# Patient Record
Sex: FEMALE | Race: WHITE | NOT HISPANIC OR LATINO | Employment: FULL TIME | URBAN - METROPOLITAN AREA
[De-identification: names, ages, dates, MRNs, and addresses within clinical notes are randomized per-mention and may not be internally consistent; named-entity substitution may affect disease eponyms.]

---

## 2017-01-16 ENCOUNTER — ANESTHESIA EVENT (OUTPATIENT)
Dept: SURGERY | Facility: OTHER | Age: 42
End: 2017-01-16
Payer: COMMERCIAL

## 2017-01-16 ENCOUNTER — HOSPITAL ENCOUNTER (OUTPATIENT)
Dept: PREADMISSION TESTING | Facility: OTHER | Age: 42
Discharge: HOME | End: 2017-01-16
Attending: OBSTETRICS & GYNECOLOGY
Payer: COMMERCIAL

## 2017-01-16 ENCOUNTER — OFFICE VISIT (OUTPATIENT)
Dept: OBSTETRICS AND GYNECOLOGY | Facility: CLINIC | Age: 42
End: 2017-01-16
Payer: COMMERCIAL

## 2017-01-16 VITALS
HEIGHT: 69 IN | BODY MASS INDEX: 35.55 KG/M2 | HEART RATE: 76 BPM | DIASTOLIC BLOOD PRESSURE: 90 MMHG | SYSTOLIC BLOOD PRESSURE: 145 MMHG | OXYGEN SATURATION: 96 % | WEIGHT: 240 LBS | TEMPERATURE: 99 F

## 2017-01-16 VITALS
HEIGHT: 68 IN | BODY MASS INDEX: 36.82 KG/M2 | DIASTOLIC BLOOD PRESSURE: 76 MMHG | WEIGHT: 242.94 LBS | SYSTOLIC BLOOD PRESSURE: 144 MMHG

## 2017-01-16 DIAGNOSIS — D25.1 FIBROIDS, INTRAMURAL: Primary | Chronic | ICD-10-CM

## 2017-01-16 DIAGNOSIS — R10.2 PELVIC PAIN IN FEMALE: ICD-10-CM

## 2017-01-16 DIAGNOSIS — M53.3 COCCYX PAIN: ICD-10-CM

## 2017-01-16 PROCEDURE — 99214 OFFICE O/P EST MOD 30 MIN: CPT | Mod: S$GLB,,, | Performed by: OBSTETRICS & GYNECOLOGY

## 2017-01-16 PROCEDURE — 1159F MED LIST DOCD IN RCRD: CPT | Mod: S$GLB,,, | Performed by: OBSTETRICS & GYNECOLOGY

## 2017-01-16 PROCEDURE — 99999 PR PBB SHADOW E&M-EST. PATIENT-LVL II: CPT | Mod: PBBFAC,,, | Performed by: OBSTETRICS & GYNECOLOGY

## 2017-01-16 RX ORDER — MIDAZOLAM HYDROCHLORIDE 5 MG/ML
4 INJECTION INTRAMUSCULAR; INTRAVENOUS ONCE AS NEEDED
Status: CANCELLED | OUTPATIENT
Start: 2017-01-16 | End: 2017-01-16

## 2017-01-16 RX ORDER — LEVOTHYROXINE SODIUM 112 UG/1
112 TABLET ORAL DAILY
COMMUNITY
End: 2017-01-26 | Stop reason: SDUPTHER

## 2017-01-16 RX ORDER — SODIUM CHLORIDE, SODIUM LACTATE, POTASSIUM CHLORIDE, CALCIUM CHLORIDE 600; 310; 30; 20 MG/100ML; MG/100ML; MG/100ML; MG/100ML
INJECTION, SOLUTION INTRAVENOUS CONTINUOUS
Status: CANCELLED | OUTPATIENT
Start: 2017-01-16

## 2017-01-16 RX ORDER — SCOLOPAMINE TRANSDERMAL SYSTEM 1 MG/1
1 PATCH, EXTENDED RELEASE TRANSDERMAL
Status: CANCELLED | OUTPATIENT
Start: 2017-01-16

## 2017-01-16 RX ORDER — FAMOTIDINE 20 MG/1
20 TABLET, FILM COATED ORAL
Status: CANCELLED | OUTPATIENT
Start: 2017-01-16 | End: 2017-01-16

## 2017-01-16 RX ORDER — ACETAMINOPHEN 10 MG/ML
1000 INJECTION, SOLUTION INTRAVENOUS
Status: CANCELLED | OUTPATIENT
Start: 2017-01-30 | End: 2017-01-30

## 2017-01-16 NOTE — DISCHARGE INSTRUCTIONS
PRE-ADMIT TESTING -  [phone]    [address]  [facility]        OUTPATIENT SURGERY UNIT - [phone]    Your surgery has been scheduled at [facility]. We are pleased to have the opportunity to serve you. For Further Information please call [phone].    On the day of surgery please report to the Information Desk on the 1st floor.    CONTACT YOUR PHYSICIAN'S OFFICE THE DAY PRIOR TO YOUR SURGERY TO OBTAIN YOUR ARRIVAL TIME.     The evening before surgery do not eat anything after 9 p.m. ( this includes hard candy, chewing gum and mints).  You may have GATORADE, POWERADE AND WATER FROM 9 p.m. until leaving home to come to the hospital.   DO NOT DRINK ANY LIQUIDS ON THE WAY TO THE HOSPITAL.     SPECIAL MEDICATION INSTRUCTIONS: TAKE medications checked off by the Anesthesiologist on your Medication List.    Angiogram Patients: Take medications as instructed by your physician, including aspirin.     Surgery Patients:    If you take ASPIRIN - Your PHYSICIAN/SURGEON will need to inform you IF/OR when you need to stop taking aspirin prior to your surgery.     Do Not take any medications containing IBUPROFEN.  Do Not Wear any make-up or dark nail polish   (especially eye make-up) to surgery. If you come to surgery with makeup on you will be required to remove the makeup or nail polish.    Do not shave your surgical area at least 5 days prior to your surgery. The surgical prep will be performed at the hospital according to Infection Control regulations.    Leave all valuables at home.   Do Not wear any jewelry or watches, including any metal in body piercings.  Contact Lens must be removed before surgery. Either do not wear the contact lens or bring a case and solution for storage.  Please bring a container for eyeglasses or dentures as required.  Bring any paperwork your physician has provided, such as consent forms,  history and physicals, doctor's orders, etc.   Bring comfortable  clothes that are loose fitting to wear upon discharge. Take into consideration the type of surgery being performed.  Maintain your diet as advised per your physician the day prior to surgery.      Adequate rest the night before surgery is advised.   Park in the Parking lot behind the hospital or in the [place] [facility] across the street from the parking lot. Parking is complimentary.  If you will be discharged the same day as your procedure, please arrange for a responsible adult to drive you home or to accompany you if traveling by taxi.   YOU WILL NOT BE PERMITTED TO DRIVE OR TO LEAVE THE HOSPITAL ALONE AFTER SURGERY.   It is strongly recommended that you arrange for someone to remain with you for the first 24 hrs following your surgery.       Thank you for your cooperation.  The Staff of [facility].        Bathing Instructions                                                                 Please shower the evening before and morning of your procedure with    ANTIBACTERIAL SOAP. ( DIAL, etc )  Concentrate on the surgical area   for at least 3 minutes and rinse completely. Dry off as usual.                            No lotions or creams.

## 2017-01-16 NOTE — PROGRESS NOTES
"C.C:  Pelvic pain secondary to fibroids    HPI : Nifna Aguilar is a 41 y.o. female No obstetric history on file. for evaluation and discussion of treatment options for persistent low pelvic/"coccyx" pain.  Patient reports that she has been worked up for this pain and it is not musculosketeal per her report.  Multiple radiologic studies/MR of pelvis reveals no explainable cause for her severe pelvic pain.  Her pain is 7/10 today and is reportedly often 10/10.  Patient desires definitive surgical management with removal of uterus.  Patient with laparoscopy and lysis of adhesions 8/2016 in [place], [place] - denies endometriosis as cause of pain.      Patient is a Orthodoxy.    Past Medical History   Diagnosis Date    Arthritis     Back pain     Cervical disc syndrome     Depression     Essential tremor     General anesthetics causing adverse effect in therapeutic use      patient reports she was told she "woke up" during tubal ligation    Goiter      MNG    Hypothyroidism     Lumbar disc disease     Patient is Orthodoxy     Polycystic ovaries     PONV (postoperative nausea and vomiting)     Vitamin D deficiency      Past Surgical History   Procedure Laterality Date    Tubal lig  1998    Shoulder surgery       right    Appendectomy      Ovarian cyst surgery Right     Dilation and curettage of uterus       Family History   Problem Relation Age of Onset    Cancer Mother 50     breast    COPD Father     Peripheral vascular disease Father     Asperger's syndrome Daughter     Thyroid disease Daughter      Hashimotos'     Social History   Substance Use Topics    Smoking status: Former Smoker     Packs/day: 0.10     Years: 5.00     Types: Cigarettes     Quit date: 7/9/1992    Smokeless tobacco: None    Alcohol use 0.6 - 1.2 oz/week     1 - 2 Glasses of wine per week      Comment: daily     OB History   No data available       Visit Vitals    BP (!) 144/76    Ht 5' 8" (1.727 m) "    Wt 110.2 kg (242 lb 15.2 oz)    LMP 01/15/2017    BMI 36.94 kg/m2       ROS:  GENERAL: Feeling well overall.   SKIN: Denies rash or lesions.   HEAD: Denies head injury or headache.   NODES: Denies enlarged lymph nodes.   CHEST: Denies chest pain or shortness of breath.   CARDIOVASCULAR: Denies palpitations or left sided chest pain.   ABDOMEN: No abdominal pain, constipation, diarrhea, nausea, vomiting or rectal bleeding.   URINARY: No frequency, dysuria, hematuria, or burning on urination.  REPRODUCTIVE: See HPI.   BREASTS: Denies pain, lumps, or nipple discharge.   HEMATOLOGIC: No easy bruisability.  MUSCULOSKELETAL: Denies joint pain or swelling.   NEUROLOGIC: Denies syncope or weakness.   PSYCHIATRIC: Denies depression, anxiety or mood swings.      PHYSICAL EXAM:  APPEARANCE: Well nourished, well developed, in no acute distress.  AFFECT: WNL, alert and oriented x 3  SKIN: No acne or hirsutism  NECK: Neck symmetric without masses or thyromegaly  NODES: No inguinal, cervical, axillary, or femoral lymph node enlargement  CHEST: Good respiratory effect  ABDOMEN: Soft.  No tenderness or masses.  No hepatosplenomegaly.  No hernias.  PELVIC: Normal external genitalia without lesions.  Normal hair distribution.  Adequate perineal body, normal urethral meatus.  Vagina moist and well rugated without lesions or discharge.  Cervix pink, without lesions, discharge or tenderness.  No significant cystocele or rectocele.  Bimanual exam shows uterus to be 10-12 weeks size, irregular contour (fibroids), and mobile.  + discomfort (low pelvic/back) to uterine palpation and manipulation.  Adnexa without masses or tenderness.    EXTREMITIES: No edema.    ASSESSMENT & PLAN:  1. Fibroids, intramural    2. Pelvic pain in female    3. Coccyx pain    4. Body mass index (bmi) 36.0-36.9, adult    5. Patient is Tenriism      I have discussed the risks, benefits, indications, and alternatives of the procedure in detail.  The  patient verbalizes her understanding.  All questions answered.  Consents signed.  The patient agrees to proceed to surgery scheduling.    Plan:  DaVinci assisted hysterectomy with bilateral salpingectomy.  Patient desires ovarian conservation.    Power of /blood refusal form reviewed with patient and spouse.    Tr Roberts IV, MD

## 2017-01-16 NOTE — MR AVS SNAPSHOT
[facility] - OB/GYN [address]  [address]  [facility] [address]  Phone: [phone]  Fax: [phone]                  Ninfa Aguilar   2017 2:30 PM   Office Visit    Description:  Female : 1975   Provider:  Tr Roberts IV, MD   Department:  [facility] - OB/GYN [address]           Reason for Visit     Pre-op Exam     enlarged Uterus     Pelvic Pain     Fibroids     Tailbone Pain                To Do List           Your Future Surgeries/Procedures     2017   Surgery with Tr Roberts IV, MD   [facility]    [address]  [facility] [address] [phone]              Goals (5 Years of Data)     None      [facility]     [facility] Nurse Care Line -  Assistance  Registered nurses in the [facility] provide clinical advisement, health education, appointment booking, and other advisory services.  Call for this free service at [phone].             Medications           Message regarding Medications     Verify the changes and/or additions to your medication regime listed below are the same as discussed with your clinician today.  If any of these changes or additions are incorrect, please notify your healthcare provider.             Verify that the below list of medications is an accurate representation of the medications you are currently taking.  If none reported, the list may be blank. If incorrect, please contact your healthcare provider. Carry this list with you in case of emergency.           Current Medications     alprazolam (XANAX) 0.5 MG tablet Take 0.25 mg by mouth.    carisoprodol (SOMA) 350 MG tablet Take 350 mg by mouth.    cholecalciferol, vitamin D3, (VITAMIN D3) 1,000 unit capsule Take 5,000 Units by mouth.    levothyroxine (SYNTHROID) 112 MCG tablet Take 112 mcg by mouth once daily.    vitamin E 1000 UNIT capsule Take 1,000 Units by mouth.           Clinical Reference  "Information           Vital Signs - Last Recorded  Most recent update: 1/16/2017  2:43 PM by Loretta Thomas MA    BP Ht Wt LMP BMI    (!) 144/76 5' 8" (1.727 m) 110.2 kg (242 lb 15.2 oz) 01/15/2017 36.94 kg/m2      Blood Pressure          Most Recent Value    BP  (!)  144/76      Allergies as of 1/16/2017     Iodine And Iodide Containing Products    Meloxicam      Immunizations Administered on Date of Encounter - 1/16/2017     None      "

## 2017-01-16 NOTE — ANESTHESIA PREPROCEDURE EVALUATION
01/16/2017  Ninfa Aguilar is a 41 y.o., female.    [facility] Anesthesia Evaluation    I have reviewed the Patient Summary Reports.    I have reviewed the Nursing Notes.   I have reviewed the Medications.     Review of Systems  Anesthesia Hx:  No problems with previous Anesthesia Possible awareness under anesthesia 1998, didn't bother her   Social:  Non-Smoker    Cardiovascular:   Exercise tolerance: good    Pulmonary:  Pulmonary Normal    Endocrine:   Hypothyroidism    Psych:   depression          Physical Exam  General:  Well nourished, Morbid Obesity    Airway/Jaw/Neck:  Mallampati: III Jaw/Neck Findings:  Neck ROM: Normal ROM                 Anesthesia Plan  Type of Anesthesia, risks & benefits discussed:  Anesthesia Type:  general  Patient's Preference:   Intra-op Monitoring Plan:   Intra-op Monitoring Plan Comments:   Post Op Pain Control Plan:   Post Op Pain Control Plan Comments:   Induction:   IV  Beta Blocker:         Informed Consent: Patient understands risks and agrees with Anesthesia plan.  Questions answered. Anesthesia consent signed with patient.  ASA Score: 2     Day of Surgery Review of History & Physical:    H&P update referred to the surgeon.     Anesthesia Plan Notes: JH, refuses blood even at risk of dying.        Ready For Surgery From Anesthesia Perspective.

## 2017-01-16 NOTE — IP AVS SNAPSHOT
[facility] Location ([facility]) [address] [address]  Phone: [phone]           I have received a copy of my After Visit Summary and discharge instructions from [facility].    INSTRUCTIONS RECEIVED AND UNDERSTOOD BY:                     Patient/Patient Representative: ________________________________________________________________     Date/Time: ________________________________________________________________                     Instructions Given By: ________________________________________________________________     Date/Time: ________________________________________________________________

## 2017-01-16 NOTE — IP AVS SNAPSHOT
[facility] Location ([facility])  [address] [place], [place] [id number]  Phone: [phone]           Patient Discharge Instructions    Our goal is to set you up for success. This packet includes information on your condition, medications, and your home care. It will help you to care for yourself so you don't get sicker.     Please ask your nurse if you have any questions.        There are many details to remember when preparing for your surgery. Here is what you will need to do, please ask your nurse if there are more specific instructions and if you have any questions:    1. 24 hours before procedure Do not smoke or drink alcoholic beverages 24 hours prior to your procedure    2. Eating before procedure Do not eat or drink anything 8 hours before your procedure - this includes gum, mints, and candy.     3. Day of procedure Please remove all jewelry for the procedure. If you wear contact lenses, dentures, hearing aids or glasses, bring a container to put them in during your surgery and give to a family member for safekeeping.  If your doctor has scheduled you for an overnight stay, bring a small overnight bag with any personal items that you need.    4. After procedure Make arrangements in advance for transportation home by a responsible adult. It is not safe to drive a vehicle during the 24 hours following surgery.     PLEASE NOTE: You may be contacted the day before your surgery to confirm your surgery date and arrival time. The Surgery schedule has many variables which may affect the time of your surgery case. Family members should be available if your surgery time changes.                [facility]  Unless otherwise directed by your provider, please contact [facility], our nurse care line that is available for 24/7 assistance.     [phone] (toll-free)    Registered nurses in the [facility] provide clinical advisement, health education, appointment booking, and other  advisory services.                    ** Verify the list of medication(s) below is accurate and up to date. Carry this with you in case of emergency. If your medications have changed, please notify your healthcare provider.             Medication List      TAKE these medications        Additional Info                      alprazolam 0.5 MG tablet   Commonly known as:  XANAX   Refills:  0   Dose:  0.25 mg    Instructions:  Take 0.25 mg by mouth.     Begin Date    AM    Noon    PM    Bedtime       carisoprodol 350 MG tablet   Commonly known as:  SOMA   Refills:  0   Dose:  350 mg    Instructions:  Take 350 mg by mouth.     Begin Date    AM    Noon    PM    Bedtime       levothyroxine 112 MCG tablet   Commonly known as:  SYNTHROID   Refills:  0   Dose:  112 mcg   What changed:  Another medication with the same name was removed. Continue taking this medication, and follow the directions you see here.    Instructions:  Take 112 mcg by mouth once daily.     Begin Date    AM    Noon    PM    Bedtime       VITAMIN D3 1,000 unit capsule   Refills:  0   Dose:  5000 Units   Generic drug:  cholecalciferol (vitamin D3)    Instructions:  Take 5,000 Units by mouth.     Begin Date    AM    Noon    PM    Bedtime       vitamin E 1000 UNIT capsule   Refills:  0   Dose:  1000 Units    Instructions:  Take 1,000 Units by mouth.     Begin Date    AM    Noon    PM    Bedtime                  Please bring to all follow up appointments:    1. A copy of your discharge instructions.  2. All medicines you are currently taking in their original bottles.  3. Identification and insurance card.    Please arrive 15 minutes ahead of scheduled appointment time.    Please call 24 hours in advance if you must reschedule your appointment and/or time.        Your Scheduled Appointments     Jan 16, 2017  2:30 PM CST   Pre OP with Tr Roberts IV, MD   [facility] - OB/GYN [address] ([facility])    [address] [address]    [phone]              Your Future Surgeries/Procedures     Jan 30, 2017   Surgery with Tr Roberts IV, MD   [facility] ([facility])    [address]  [facility] [id number] [phone]                  Discharge Instructions       PRE-ADMIT TESTING -  [phone]    [address]  [facility]        OUTPATIENT SURGERY UNIT - [phone]    Your surgery has been scheduled at [facility]. We are pleased to have the opportunity to serve you. For Further Information please call [phone].    On the day of surgery please report to the Information Desk on the 1st floor.    CONTACT YOUR PHYSICIAN'S OFFICE THE DAY PRIOR TO YOUR SURGERY TO OBTAIN YOUR ARRIVAL TIME.     The evening before surgery do not eat anything after 9 p.m. ( this includes hard candy, chewing gum and mints).  You may have GATORADE, POWERADE AND WATER FROM 9 p.m. until leaving home to come to the hospital.   DO NOT DRINK ANY LIQUIDS ON THE WAY TO THE HOSPITAL.     SPECIAL MEDICATION INSTRUCTIONS: TAKE medications checked off by the Anesthesiologist on your Medication List.    Angiogram Patients: Take medications as instructed by your physician, including aspirin.     Surgery Patients:    If you take ASPIRIN - Your PHYSICIAN/SURGEON will need to inform you IF/OR when you need to stop taking aspirin prior to your surgery.     Do Not take any medications containing IBUPROFEN.  Do Not Wear any make-up or dark nail polish   (especially eye make-up) to surgery. If you come to surgery with makeup on you will be required to remove the makeup or nail polish.    Do not shave your surgical area at least 5 days prior to your surgery. The surgical prep will be performed at the hospital according to Infection Control regulations.    Leave all valuables at home.   Do Not wear any jewelry or watches, including any metal in body piercings.  Contact Lens must be removed before surgery. Either  "do not wear the contact lens or bring a case and solution for storage.  Please bring a container for eyeglasses or dentures as required.  Bring any paperwork your physician has provided, such as consent forms,  history and physicals, doctor's orders, etc.   Bring comfortable clothes that are loose fitting to wear upon discharge. Take into consideration the type of surgery being performed.  Maintain your diet as advised per your physician the day prior to surgery.      Adequate rest the night before surgery is advised.   Park in the Parking lot behind the hospital or in the [facility] [facility] across the street from the parking lot. Parking is complimentary.  If you will be discharged the same day as your procedure, please arrange for a responsible adult to drive you home or to accompany you if traveling by taxi.   YOU WILL NOT BE PERMITTED TO DRIVE OR TO LEAVE THE HOSPITAL ALONE AFTER SURGERY.   It is strongly recommended that you arrange for someone to remain with you for the first 24 hrs following your surgery.       Thank you for your cooperation.  The Staff of [facility].        Bathing Instructions                                                                 Please shower the evening before and morning of your procedure with    ANTIBACTERIAL SOAP. ( DIAL, etc )  Concentrate on the surgical area   for at least 3 minutes and rinse completely. Dry off as usual.                            No lotions or creams.                        Admission Information     Date & Time Provider Department CSN    1/16/2017 12:30 PM Tr Roberts IV, MD [facility] [id number]      Care Providers     Provider Role Specialty Primary office phone    Tr Roberts IV, MD Attending Provider Obstetrics [phone]      Your Vitals Were     BP Pulse Temp Height Weight SpO2    145/90 76 98.9 °F (37.2 °C) (Oral) 5' 8.5" (1.74 m) 108.9 kg (240 lb) 96%    BMI                35.96 kg/m2        "   Recent Lab Values        8/25/2010 11/22/2016                       12:40 PM 10:55 AM          A1C 5.6 5.6          Comment for A1C at 10:55 AM on 11/22/2016:  According to ADA guidelines, hemoglobin A1C <7.0% represents  optimal control in non-pregnant diabetic patients.  Different  metrics may apply to specific populations.   Standards of Medical Care in Diabetes - 2016.  For the purpose of screening for the presence of diabetes:  <5.7%     Consistent with the absence of diabetes  5.7-6.4%  Consistent with increasing risk for diabetes   (prediabetes)  >or=6.5%  Consistent with diabetes  Currently no consensus exists for use of hemoglobin A1C  for diagnosis of diabetes for children.        Allergies as of 1/16/2017        Reactions    Iodine And Iodide Containing Products Rash    Eats shellfish.  Eats shellfish.    Meloxicam Hives    Can take ibuprofen      Advance Directives     An advance directive is a document which, in the event you are no longer able to make decisions for yourself, tells your healthcare team what kind of treatment you do or do not want to receive, or who you would like to make those decisions for you.  If you do not currently have an advance directive, [facility] encourages you to create one.  For more information call:  [phone] ([phone]), [phone] ([phone]),  or log on to [email or web address].        Smoking Cessation     If you would like to quit smoking:   You may be eligible for free services if you are a [place] resident and started smoking cigarettes before September 1, 1988.  Call the Smoking Cessation Trust (RUST) toll free at (359) 956-0512 or (075) 313-5825.   Call 4-668-QUIT-NOW if you do not meet the above criteria.            Language Assistance Services     ATTENTION: Language assistance services are available, free of charge. Please call [phone].      ATENCIÓN: Si habla español, tiene a nguyen disposición servicios gratuitos de asistencia  lingüística. Davion peace [phone].     MIGUEL Ý: N?u b?n nói Ti?ng Vi?t, có các d?ch v? h? tr? ngôn ng? mi?n phí dành cho b?n. G?i s? [phone].         [facility] complies with applicable Federal civil rights laws and does not discriminate on the basis of race, color, national origin, age, disability, or sex.

## 2017-01-26 ENCOUNTER — TELEPHONE (OUTPATIENT)
Dept: ENDOCRINOLOGY | Facility: CLINIC | Age: 42
End: 2017-01-26

## 2017-01-26 DIAGNOSIS — E06.3 HYPOTHYROIDISM DUE TO HASHIMOTO'S THYROIDITIS: Primary | ICD-10-CM

## 2017-01-26 DIAGNOSIS — E03.8 HYPOTHYROIDISM DUE TO HASHIMOTO'S THYROIDITIS: Primary | ICD-10-CM

## 2017-01-26 RX ORDER — LEVOTHYROXINE SODIUM 112 UG/1
112 TABLET ORAL DAILY
Qty: 90 TABLET | Refills: 2 | Status: SHIPPED | OUTPATIENT
Start: 2017-01-26 | End: 2021-07-21 | Stop reason: DRUGHIGH

## 2017-01-27 ENCOUNTER — TELEPHONE (OUTPATIENT)
Dept: OBSTETRICS AND GYNECOLOGY | Facility: CLINIC | Age: 42
End: 2017-01-27

## 2017-01-27 NOTE — TELEPHONE ENCOUNTER
Patient informed she is now first case, report to surgery center at 530 am Monday 1/30/2017. Instructions given on clear liquid diet and only water/power aid after 10pm, until she starts travel to hospital in the am. Patient verbalized understanding

## 2017-01-27 NOTE — TELEPHONE ENCOUNTER
Results for orders placed or performed in visit on 11/22/16   TSH   Result Value Ref Range    TSH 0.616 0.400 - 4.000 uIU/mL   T4, free   Result Value Ref Range    Free T4 1.09 0.71 - 1.51 ng/dL   T3, free   Result Value Ref Range    T3, Free 1.9 (L) 2.3 - 4.2 pg/mL   Comprehensive metabolic panel   Result Value Ref Range    Sodium 138 136 - 145 mmol/L    Potassium 4.4 3.5 - 5.1 mmol/L    Chloride 106 95 - 110 mmol/L    CO2 23 23 - 29 mmol/L    Glucose 94 70 - 110 mg/dL    BUN, Bld 11 6 - 20 mg/dL    Creatinine 1.0 0.5 - 1.4 mg/dL    Calcium 8.7 8.7 - 10.5 mg/dL    Total Protein 6.9 6.0 - 8.4 g/dL    Albumin 3.8 3.5 - 5.2 g/dL    Total Bilirubin 0.5 0.1 - 1.0 mg/dL    Alkaline Phosphatase 86 55 - 135 U/L    AST 23 10 - 40 U/L    ALT 24 10 - 44 U/L    Anion Gap 9 8 - 16 mmol/L    eGFR if African American >60.0 >60 mL/min/1.73 m^2    eGFR if non African American >60.0 >60 mL/min/1.73 m^2   Hemoglobin A1c   Result Value Ref Range    Hemoglobin A1C 5.6 4.5 - 6.2 %    Estimated Avg Glucose 114 68 - 131 mg/dL         Spoke with patient and she says that her PCP increased her Levothyroxine dose to 112 mcg daily. New lab test shows a TSH of 0.297 [ 0.358-3.740], Free T4 of 1.17 [0.76-1.46 and Free T3 of 2.36 [ 2.18-3.98].  Told her that TSH is slightly low but she is feeling fine so will continue same dose for now. She is having a hysterectomy next week because of heavy and prolong bleeding.     Plan: Levothyroxine 112 mcg refill was sent to her pharmacy.

## 2017-01-30 ENCOUNTER — ANESTHESIA (OUTPATIENT)
Dept: SURGERY | Facility: OTHER | Age: 42
End: 2017-01-30
Payer: COMMERCIAL

## 2017-01-30 ENCOUNTER — SURGERY (OUTPATIENT)
Age: 42
End: 2017-01-30

## 2017-01-30 PROCEDURE — 63600175 PHARM REV CODE 636 W HCPCS: Performed by: ANESTHESIOLOGY

## 2017-01-30 PROCEDURE — 25000003 PHARM REV CODE 250: Performed by: NURSE ANESTHETIST, CERTIFIED REGISTERED

## 2017-01-30 PROCEDURE — 63600175 PHARM REV CODE 636 W HCPCS: Performed by: NURSE ANESTHETIST, CERTIFIED REGISTERED

## 2017-01-30 PROCEDURE — 63600175 PHARM REV CODE 636 W HCPCS: Performed by: OBSTETRICS & GYNECOLOGY

## 2017-01-30 PROCEDURE — 25000003 PHARM REV CODE 250: Performed by: ANESTHESIOLOGY

## 2017-01-30 RX ORDER — ROCURONIUM BROMIDE 10 MG/ML
INJECTION, SOLUTION INTRAVENOUS
Status: DISCONTINUED | OUTPATIENT
Start: 2017-01-30 | End: 2017-01-30

## 2017-01-30 RX ORDER — LIDOCAINE HCL/PF 100 MG/5ML
SYRINGE (ML) INTRAVENOUS
Status: DISCONTINUED | OUTPATIENT
Start: 2017-01-30 | End: 2017-01-30

## 2017-01-30 RX ORDER — DEXAMETHASONE SODIUM PHOSPHATE 4 MG/ML
INJECTION, SOLUTION INTRA-ARTICULAR; INTRALESIONAL; INTRAMUSCULAR; INTRAVENOUS; SOFT TISSUE
Status: DISCONTINUED | OUTPATIENT
Start: 2017-01-30 | End: 2017-01-30

## 2017-01-30 RX ORDER — ONDANSETRON 2 MG/ML
INJECTION INTRAMUSCULAR; INTRAVENOUS
Status: DISCONTINUED | OUTPATIENT
Start: 2017-01-30 | End: 2017-01-30

## 2017-01-30 RX ORDER — FENTANYL CITRATE 50 UG/ML
INJECTION, SOLUTION INTRAMUSCULAR; INTRAVENOUS
Status: DISCONTINUED | OUTPATIENT
Start: 2017-01-30 | End: 2017-01-30

## 2017-01-30 RX ORDER — PROPOFOL 10 MG/ML
VIAL (ML) INTRAVENOUS
Status: DISCONTINUED | OUTPATIENT
Start: 2017-01-30 | End: 2017-01-30

## 2017-01-30 RX ORDER — KETOROLAC TROMETHAMINE 30 MG/ML
INJECTION, SOLUTION INTRAMUSCULAR; INTRAVENOUS
Status: DISCONTINUED | OUTPATIENT
Start: 2017-01-30 | End: 2017-01-30

## 2017-01-30 RX ORDER — MIDAZOLAM HYDROCHLORIDE 1 MG/ML
INJECTION INTRAMUSCULAR; INTRAVENOUS
Status: DISCONTINUED | OUTPATIENT
Start: 2017-01-30 | End: 2017-01-30

## 2017-01-30 RX ORDER — GLYCOPYRROLATE 0.2 MG/ML
INJECTION INTRAMUSCULAR; INTRAVENOUS
Status: DISCONTINUED | OUTPATIENT
Start: 2017-01-30 | End: 2017-01-30

## 2017-01-30 RX ORDER — ACETAMINOPHEN 10 MG/ML
INJECTION, SOLUTION INTRAVENOUS
Status: DISCONTINUED | OUTPATIENT
Start: 2017-01-30 | End: 2017-01-30

## 2017-01-30 RX ORDER — NEOSTIGMINE METHYLSULFATE 1 MG/ML
INJECTION, SOLUTION INTRAVENOUS
Status: DISCONTINUED | OUTPATIENT
Start: 2017-01-30 | End: 2017-01-30

## 2017-01-30 RX ADMIN — DEXAMETHASONE SODIUM PHOSPHATE 8 MG: 4 INJECTION, SOLUTION INTRAMUSCULAR; INTRAVENOUS at 07:01

## 2017-01-30 RX ADMIN — ROCURONIUM BROMIDE 10 MG: 10 INJECTION INTRAVENOUS at 07:01

## 2017-01-30 RX ADMIN — ONDANSETRON 4 MG: 2 INJECTION INTRAMUSCULAR; INTRAVENOUS at 07:01

## 2017-01-30 RX ADMIN — LIDOCAINE HYDROCHLORIDE 100 MG: 20 INJECTION, SOLUTION INTRAVENOUS at 07:01

## 2017-01-30 RX ADMIN — ACETAMINOPHEN 1000 MG: 10 INJECTION, SOLUTION INTRAVENOUS at 07:01

## 2017-01-30 RX ADMIN — ROCURONIUM BROMIDE 40 MG: 10 INJECTION INTRAVENOUS at 07:01

## 2017-01-30 RX ADMIN — FENTANYL CITRATE 100 MCG: 50 INJECTION, SOLUTION INTRAMUSCULAR; INTRAVENOUS at 07:01

## 2017-01-30 RX ADMIN — ROCURONIUM BROMIDE 10 MG: 10 INJECTION INTRAVENOUS at 08:01

## 2017-01-30 RX ADMIN — GLYCOPYRROLATE 0.2 MG: 0.2 INJECTION, SOLUTION INTRAMUSCULAR; INTRAVENOUS at 07:01

## 2017-01-30 RX ADMIN — SODIUM CHLORIDE, SODIUM LACTATE, POTASSIUM CHLORIDE, AND CALCIUM CHLORIDE: 600; 310; 30; 20 INJECTION, SOLUTION INTRAVENOUS at 06:01

## 2017-01-30 RX ADMIN — NEOSTIGMINE METHYLSULFATE 5 MG: 1 INJECTION INTRAVENOUS at 09:01

## 2017-01-30 RX ADMIN — PROPOFOL 200 MG: 10 INJECTION, EMULSION INTRAVENOUS at 07:01

## 2017-01-30 RX ADMIN — CARBOXYMETHYLCELLULOSE SODIUM 2 DROP: 2.5 SOLUTION/ DROPS OPHTHALMIC at 07:01

## 2017-01-30 RX ADMIN — CEFAZOLIN SODIUM 2 G: 2 SOLUTION INTRAVENOUS at 07:01

## 2017-01-30 RX ADMIN — FENTANYL CITRATE 50 MCG: 50 INJECTION, SOLUTION INTRAMUSCULAR; INTRAVENOUS at 09:01

## 2017-01-30 RX ADMIN — GLYCOPYRROLATE 0.8 MG: 0.2 INJECTION, SOLUTION INTRAMUSCULAR; INTRAVENOUS at 09:01

## 2017-01-30 RX ADMIN — PROPOFOL 100 MG: 10 INJECTION, EMULSION INTRAVENOUS at 09:01

## 2017-01-30 RX ADMIN — MIDAZOLAM HYDROCHLORIDE 2 MG: 1 INJECTION, SOLUTION INTRAMUSCULAR; INTRAVENOUS at 06:01

## 2017-01-30 RX ADMIN — KETOROLAC TROMETHAMINE 30 MG: 30 INJECTION, SOLUTION INTRAMUSCULAR; INTRAVENOUS at 09:01

## 2017-01-30 NOTE — TRANSFER OF CARE
"Anesthesia Transfer of Care Note    Patient: Ninfa Aguilar    Procedure(s) Performed: Procedure(s) (LRB):  ROBOTIC ASSISTED LAPAROSCOPIC HYSTERECTOMY WITH POSSIBLE STAGING (N/A)  SALPINGECTOMY-ROBOTIC ASSISTED (Bilateral)    Patient location: PACU    Anesthesia Type: general    Transport from OR: Transported from OR on 2-3 L/min O2 by NC with adequate spontaneous ventilation    Post pain: adequate analgesia    Post assessment: no apparent anesthetic complications    Post vital signs: stable    Level of consciousness: awake, alert and oriented    Nausea/Vomiting: no nausea/vomiting    Complications: none          Last vitals:   Visit Vitals    BP (!) 142/76 (BP Location: Left arm, Patient Position: Lying, BP Method: Automatic)    Pulse 65    Temp 37.1 °C (98.8 °F) (Oral)    Resp 16    Ht 5' 8.5" (1.74 m)    Wt 108.9 kg (240 lb)    LMP 01/15/2017    SpO2 99%    Breastfeeding No    BMI 35.96 kg/m2     "

## 2017-01-30 NOTE — ANESTHESIA POSTPROCEDURE EVALUATION
"Anesthesia Post Evaluation    Patient: Ninfa Aguilar    Procedure(s) Performed: Procedure(s) (LRB):  ROBOTIC ASSISTED LAPAROSCOPIC HYSTERECTOMY WITH POSSIBLE STAGING (N/A)  SALPINGECTOMY-ROBOTIC ASSISTED (Bilateral)    Final Anesthesia Type: general  Patient location during evaluation: PACU  Patient participation: Yes- Able to Participate  Level of consciousness: awake and alert  Post-procedure vital signs: reviewed and stable  Pain management: adequate  Airway patency: patent  PONV status at discharge: No PONV  Anesthetic complications: no      Cardiovascular status: hemodynamically stable  Respiratory status: unassisted and spontaneous ventilation  Hydration status: euvolemic  Follow-up not needed.        Visit Vitals    BP (!) 142/91 (BP Location: Right arm, Patient Position: Lying, BP Method: Automatic)    Pulse 81    Temp 36.5 °C (97.7 °F) (Oral)    Resp 18    Ht 5' 8.5" (1.74 m)    Wt 108.9 kg (240 lb)    LMP 01/15/2017    SpO2 99%    Breastfeeding No    BMI 35.96 kg/m2       Pain/Lina Score: Pain Assessment Performed: Yes (1/30/2017  1:00 PM)  Presence of Pain: complains of pain/discomfort (1/30/2017  1:00 PM)  Pain Rating Prior to Med Admin: 8 (1/30/2017 11:47 AM)  Pain Rating Post Med Admin: 7 (1/30/2017 12:00 PM)  Lina Score: 10 (1/30/2017  1:00 PM)      "

## 2017-02-01 ENCOUNTER — TELEPHONE (OUTPATIENT)
Dept: OBSTETRICS AND GYNECOLOGY | Facility: CLINIC | Age: 42
End: 2017-02-01

## 2017-02-01 NOTE — TELEPHONE ENCOUNTER
Pt scheduled 3/9/2017, patient reports passing gas. Patient instructed on continuing colace and walking and water intake. Patient to contact office with concerns or questions.

## 2017-02-09 ENCOUNTER — PATIENT MESSAGE (OUTPATIENT)
Dept: OBSTETRICS AND GYNECOLOGY | Facility: CLINIC | Age: 42
End: 2017-02-09

## 2017-02-12 ENCOUNTER — PATIENT MESSAGE (OUTPATIENT)
Dept: OBSTETRICS AND GYNECOLOGY | Facility: CLINIC | Age: 42
End: 2017-02-12

## 2017-02-13 ENCOUNTER — PATIENT MESSAGE (OUTPATIENT)
Dept: OBSTETRICS AND GYNECOLOGY | Facility: CLINIC | Age: 42
End: 2017-02-13

## 2017-02-13 ENCOUNTER — TELEPHONE (OUTPATIENT)
Dept: OBSTETRICS AND GYNECOLOGY | Facility: CLINIC | Age: 42
End: 2017-02-13

## 2017-02-13 NOTE — TELEPHONE ENCOUNTER
Patient is being treated for UTI, she was prescribed Bactrim. [facility] 1/30/2017, reports bladder pain, denies frequency or painful urination. Patient reports she is constipated. She has stopped norco, so she stopped Colace.  Instructed patient to restart colace, and if needs to increase to bid can do so.

## 2017-03-09 ENCOUNTER — OFFICE VISIT (OUTPATIENT)
Dept: OBSTETRICS AND GYNECOLOGY | Facility: CLINIC | Age: 42
End: 2017-03-09
Payer: COMMERCIAL

## 2017-03-09 VITALS
BODY MASS INDEX: 36.52 KG/M2 | DIASTOLIC BLOOD PRESSURE: 76 MMHG | WEIGHT: 240.94 LBS | SYSTOLIC BLOOD PRESSURE: 136 MMHG | HEIGHT: 68 IN

## 2017-03-09 DIAGNOSIS — Z09 EXAMINATION FOLLOWING SURGERY: Primary | ICD-10-CM

## 2017-03-09 PROCEDURE — 99999 PR PBB SHADOW E&M-EST. PATIENT-LVL III: CPT | Mod: PBBFAC,,, | Performed by: OBSTETRICS & GYNECOLOGY

## 2017-03-09 PROCEDURE — 99024 POSTOP FOLLOW-UP VISIT: CPT | Mod: S$GLB,,, | Performed by: OBSTETRICS & GYNECOLOGY

## 2017-03-09 RX ORDER — SULFAMETHOXAZOLE AND TRIMETHOPRIM 800; 160 MG/1; MG/1
TABLET ORAL
Refills: 0 | COMMUNITY
Start: 2017-02-13 | End: 2018-05-10

## 2017-03-09 RX ORDER — AZITHROMYCIN 250 MG/1
TABLET, FILM COATED ORAL
Refills: 0 | COMMUNITY
Start: 2016-11-30 | End: 2018-05-10

## 2017-03-09 RX ORDER — DEXAMETHASONE 0.75 MG/1
TABLET ORAL
Refills: 0 | COMMUNITY
Start: 2016-11-30 | End: 2018-10-05

## 2017-03-09 NOTE — PROGRESS NOTES
"CC: Postop visit    Ninfa Aguilar is a 41 y.o. female No obstetric history on file. post-op from a Davinci assisted hysterectomy on 1/30/2017.  Patient is doing well postoperatively.  No pain.  No bowel or bladder complaints.  No fever.      The pathology revealed benign findings/report given to patient      Past Medical History:   Diagnosis Date    Arthritis     Back pain     Cervical disc syndrome     Depression     Essential tremor     General anesthetics causing adverse effect in therapeutic use     patient reports she was told she "woke up" during tubal ligation    Goiter     MNG    Hypothyroidism     Lumbar disc disease     Patient is Sikh     Polycystic ovaries     PONV (postoperative nausea and vomiting)     Vitamin D deficiency      Past Surgical History:   Procedure Laterality Date    APPENDECTOMY      DILATION AND CURETTAGE OF UTERUS      HYSTERECTOMY  01/30/2017    DLH ov in situ     OVARIAN CYST SURGERY Right     SHOULDER SURGERY      right    tubal lig  1998     Family History   Problem Relation Age of Onset    Cancer Mother 50     breast    COPD Father     Peripheral vascular disease Father     Asperger's syndrome Daughter     Thyroid disease Daughter      Hashimotos'     Social History   Substance Use Topics    Smoking status: Former Smoker     Packs/day: 0.10     Years: 5.00     Types: Cigarettes     Quit date: 7/9/1992    Smokeless tobacco: Never Used    Alcohol use 0.6 - 1.2 oz/week     1 - 2 Glasses of wine per week      Comment: daily     OB History   No data available       /76  Ht 5' 8" (1.727 m)  Wt 109.3 kg (240 lb 15.4 oz)  LMP 01/15/2017  BMI 36.64 kg/m2    ROS:  GENERAL: No fever, chills, fatigability or weight loss.  VULVAR: No pain, no lesions and no itching.  VAGINAL: No relaxation, no itching, no discharge, no abnormal bleeding and no lesions.  ABDOMEN: No abdominal pain. Denies nausea. Denies vomiting. No diarrhea. No " constipation  BREAST: Denies pain. No lumps. No discharge.  URINARY: No incontinence, no nocturia, no frequency and no dysuria.  CARDIOVASCULAR: No chest pain. No shortness of breath. No leg cramps.  NEUROLOGICAL: No headaches. No vision changes.    PE:   APPEARANCE: Well nourished, well developed, in no acute distress.  GENITOURINARY:  Vulva: No lesions. No erythema nor excoriations noted,Normal female genital architecture.  Urethral Meatus: Normal size and location, no lesions, no prolapse.  Urethra: No masses, tenderness, prolapse or scarring.  Vagin Moist and with rugae, no discharge, no significant cystocele or rectocele.  Vaginal cuff healing well.  Cervix: Absent  Uterus: Absent  Adnexa: No masses, tenderness or CDS nodularity.  Anus Perineum: No lesions, no relaxation, no external hemorrhoids.  Abdomen: No masses, tenderness, hernia or ascites, no hepatosplenomegaly.  Davinci port sites healed.   Skin: No rashes, lesions, ulcers, acne, hirsutism.  Peripheral/lower extremities: No edema, erythema or tenderness.  Lymphatic: No groin nodes palp.  Mental Status: Alert, oriented x 3, normal affect and mood      ICD-10-CM ICD-9-CM    1. Examination following surgery Z09 V67.00      Plan:  Patient cleared for routine activity/exercise.    Continue pelvic rest x 1o weeks.  Patient verbalized understanding.    Annual exam in one year.    Patient instructed to monitor for menopausal symptoms/hot flashes.    Keep regular PCP follow-up    Keep mammogram follow-up - will review DIS report (we ordered but no report obtained).    Return in about 1 year (around 3/9/2018).    Tr Roberts IV, MD

## 2017-03-09 NOTE — MR AVS SNAPSHOT
[facility] [address]  [address]  [place] [address]  Phone: [phone]  Fax: [phone]                  Ninfa Aguilar   3/9/2017 2:15 PM   Office Visit    Description:  Female : 1975   Provider:  Tr Roberts IV, MD   Department:  [facility] [address]           Reason for Visit     Post-op Evaluation                To Do List           Goals (5 Years of Data)     None      [facility] On Call     [facility] On Call Nurse Care Line -  Assistance  Registered nurses in the [facility] On Call Center provide clinical advisement, health education, appointment booking, and other advisory services.  Call for this free service at [phone].             Medications           Message regarding Medications     Verify the changes and/or additions to your medication regime listed below are the same as discussed with your clinician today.  If any of these changes or additions are incorrect, please notify your healthcare provider.        STOP taking these medications     ibuprofen (ADVIL,MOTRIN) 600 MG tablet Take 1 tablet (600 mg total) by mouth every 6 (six) hours as needed for Pain.    hydrocodone-acetaminophen 5-325mg (NORCO) 5-325 mg per tablet Take 1 tablet by mouth every 4 (four) hours as needed for Pain.           Verify that the below list of medications is an accurate representation of the medications you are currently taking.  If none reported, the list may be blank. If incorrect, please contact your healthcare provider. Carry this list with you in case of emergency.           Current Medications     cholecalciferol, vitamin D3, (VITAMIN D3) 1,000 unit capsule Take 5,000 Units by mouth.    levothyroxine (SYNTHROID) 112 MCG tablet Take 1 tablet (112 mcg total) by mouth once daily.    vitamin E 1000 UNIT capsule Take 1,000 Units by mouth.    alprazolam (XANAX) 0.5 MG tablet Take 0.25 mg by mouth.    azithromycin (Z-PAOLO) 250 MG tablet     carisoprodol (SOMA) 350  "MG tablet Take 350 mg by mouth.    dexamethasone (DECADRON) 0.75 MG Tab TK 1 T PO TID FOR 5 DAYS    sulfamethoxazole-trimethoprim 800-160mg (BACTRIM DS) 800-160 mg Tab TK 1 T PO BID FOR 10 DAYS           Clinical Reference Information           Your Vitals Were     BP Height Weight Last Period BMI    136/76 5' 8" (1.727 m) 109.3 kg (240 lb 15.4 oz) 01/15/2017 36.64 kg/m2      Blood Pressure          Most Recent Value    BP  136/76      Allergies as of 3/9/2017     Iodine And Iodide Containing Products    Meloxicam      Immunizations Administered on Date of Encounter - 3/9/2017     None      Language Assistance Services     ATTENTION: Language assistance services are available, free of charge. Please call [phone].      ATENCIÓN: Si faviola manley, tiene a nguyen disposición servicios gratuitos de asistencia lingüística. Llame al [phone].     CHÚ Ý: N?u b?n nói Ti?ng Vi?t, có các d?ch v? h? tr? ngôn ng? mi?n phí dành cho b?n. G?i s? [phone].         [facility] [address] complies with applicable Federal civil rights laws and does not discriminate on the basis of race, color, national origin, age, disability, or sex.        "

## 2017-03-17 ENCOUNTER — TELEPHONE (OUTPATIENT)
Dept: OBSTETRICS AND GYNECOLOGY | Facility: CLINIC | Age: 42
End: 2017-03-17

## 2017-03-17 DIAGNOSIS — Z12.31 VISIT FOR SCREENING MAMMOGRAM: Primary | ICD-10-CM

## 2017-03-17 DIAGNOSIS — R92.1 BREAST CALCIFICATION, LEFT: ICD-10-CM

## 2017-03-17 NOTE — TELEPHONE ENCOUNTER
Spoke with Nidhi at [facility], discussed last recommendation from DIS, recommended to order Dx Bilateral mmg.

## 2017-06-01 ENCOUNTER — HOSPITAL ENCOUNTER (OUTPATIENT)
Dept: RADIOLOGY | Facility: HOSPITAL | Age: 42
Discharge: HOME | End: 2017-06-01
Attending: OBSTETRICS & GYNECOLOGY
Payer: COMMERCIAL

## 2017-06-01 DIAGNOSIS — Z12.31 VISIT FOR SCREENING MAMMOGRAM: ICD-10-CM

## 2017-06-01 DIAGNOSIS — R92.1 BREAST CALCIFICATION, LEFT: ICD-10-CM

## 2017-06-01 PROCEDURE — 77062 BREAST TOMOSYNTHESIS BI: CPT | Mod: 26,,, | Performed by: RADIOLOGY

## 2017-06-01 PROCEDURE — 77066 DX MAMMO INCL CAD BI: CPT | Mod: 26,,, | Performed by: RADIOLOGY

## 2017-06-01 PROCEDURE — 77062 BREAST TOMOSYNTHESIS BI: CPT | Mod: TC

## 2017-06-06 ENCOUNTER — TELEPHONE (OUTPATIENT)
Dept: OBSTETRICS AND GYNECOLOGY | Facility: CLINIC | Age: 42
End: 2017-06-06

## 2017-06-06 DIAGNOSIS — N95.1 MENOPAUSAL SYMPTOMS: Primary | ICD-10-CM

## 2017-06-09 ENCOUNTER — LAB VISIT (OUTPATIENT)
Dept: LAB | Facility: HOSPITAL | Age: 42
End: 2017-06-09
Attending: OBSTETRICS & GYNECOLOGY
Payer: COMMERCIAL

## 2017-06-09 DIAGNOSIS — N95.1 MENOPAUSAL SYMPTOMS: ICD-10-CM

## 2017-06-09 LAB
ESTRADIOL SERPL-MCNC: 18 PG/ML
FSH SERPL-ACNC: 12.6 MIU/ML
LH SERPL-ACNC: 2.8 MIU/ML
TSH SERPL DL<=0.005 MIU/L-ACNC: 0.63 UIU/ML

## 2017-06-09 PROCEDURE — 82670 ASSAY OF TOTAL ESTRADIOL: CPT

## 2017-06-09 PROCEDURE — 83002 ASSAY OF GONADOTROPIN (LH): CPT

## 2017-06-09 PROCEDURE — 83520 IMMUNOASSAY QUANT NOS NONAB: CPT

## 2017-06-09 PROCEDURE — 36415 COLL VENOUS BLD VENIPUNCTURE: CPT

## 2017-06-09 PROCEDURE — 83001 ASSAY OF GONADOTROPIN (FSH): CPT

## 2017-06-09 PROCEDURE — 84443 ASSAY THYROID STIM HORMONE: CPT

## 2017-06-12 LAB — MIS SERPL-MCNC: <0.1 NG/ML (ref 0.9–9.5)

## 2017-06-21 ENCOUNTER — TELEPHONE (OUTPATIENT)
Dept: OBSTETRICS AND GYNECOLOGY | Facility: CLINIC | Age: 42
End: 2017-06-21

## 2017-07-10 ENCOUNTER — PATIENT MESSAGE (OUTPATIENT)
Dept: OBSTETRICS AND GYNECOLOGY | Facility: CLINIC | Age: 42
End: 2017-07-10

## 2017-07-10 DIAGNOSIS — N95.1 MENOPAUSAL SYMPTOMS: ICD-10-CM

## 2017-07-10 DIAGNOSIS — N95.1 MENOPAUSAL SYMPTOMS: Primary | ICD-10-CM

## 2017-07-10 RX ORDER — ESTRADIOL 0.1 MG/D
FILM, EXTENDED RELEASE TRANSDERMAL
Qty: 26 PATCH | Refills: 6 | Status: SHIPPED | OUTPATIENT
Start: 2017-07-10 | End: 2017-08-30

## 2017-07-10 RX ORDER — ESTRADIOL 0.1 MG/D
1 FILM, EXTENDED RELEASE TRANSDERMAL
Qty: 8 PATCH | Refills: 6 | Status: SHIPPED | OUTPATIENT
Start: 2017-07-10 | End: 2017-07-10 | Stop reason: SDUPTHER

## 2017-08-11 ENCOUNTER — PATIENT MESSAGE (OUTPATIENT)
Dept: OBSTETRICS AND GYNECOLOGY | Facility: CLINIC | Age: 42
End: 2017-08-11

## 2017-08-24 ENCOUNTER — PATIENT MESSAGE (OUTPATIENT)
Dept: OBSTETRICS AND GYNECOLOGY | Facility: CLINIC | Age: 42
End: 2017-08-24

## 2017-08-24 DIAGNOSIS — N95.1 MENOPAUSAL SYMPTOMS: Primary | ICD-10-CM

## 2017-08-30 DIAGNOSIS — N95.1 MENOPAUSAL SYMPTOMS: ICD-10-CM

## 2017-08-30 RX ORDER — ESTRADIOL 2 MG/1
2 TABLET ORAL DAILY
Qty: 30 TABLET | Refills: 6 | Status: SHIPPED | OUTPATIENT
Start: 2017-08-30 | End: 2017-08-30 | Stop reason: SDUPTHER

## 2017-08-30 RX ORDER — ESTRADIOL 2 MG/1
TABLET ORAL
Qty: 90 TABLET | Refills: 6 | Status: SHIPPED | OUTPATIENT
Start: 2017-08-30 | End: 2018-05-10 | Stop reason: SDUPTHER

## 2017-09-05 ENCOUNTER — PATIENT MESSAGE (OUTPATIENT)
Dept: OBSTETRICS AND GYNECOLOGY | Facility: CLINIC | Age: 42
End: 2017-09-05

## 2017-09-06 NOTE — TELEPHONE ENCOUNTER
Patient instructed not to use oil based lubricants, correction from last note. Patient to use water based lubricants. Patient still having vaginal dryness, started pill yesterday. Patient did also use patch then had irritation with patch, after use for a month and a half. Informed will check with Dr. Roberts regarding vaginal dryness for his recommendation on vaginal dryness.

## 2017-10-11 ENCOUNTER — PATIENT MESSAGE (OUTPATIENT)
Dept: OBSTETRICS AND GYNECOLOGY | Facility: CLINIC | Age: 42
End: 2017-10-11

## 2017-12-27 ENCOUNTER — TELEPHONE (OUTPATIENT)
Dept: OBSTETRICS AND GYNECOLOGY | Facility: CLINIC | Age: 42
End: 2017-12-27

## 2017-12-27 NOTE — TELEPHONE ENCOUNTER
----- Message from Dania Farias sent at 12/27/2017 11:49 AM CST -----  x_  1st Request  _  2nd Request  _  3rd Request        Who: wade    Why: pt. Would like to speak with dr. Roberts nurse regarding f/u after surgery. Please call to discuss    What Number to Call Back:    When to Expect a call back: (Before the end of the day)   -- if the call is after 12:00, the call back will be tomorrow.

## 2017-12-27 NOTE — TELEPHONE ENCOUNTER
Informed pt she is due for her annual exam. Pt unable to schedule states she is driving, will call back to schedule.

## 2018-04-02 ENCOUNTER — HOSPITAL ENCOUNTER (OUTPATIENT)
Dept: RADIOLOGY | Facility: HOSPITAL | Age: 43
Discharge: HOME | End: 2018-04-02
Attending: ORTHOPAEDIC SURGERY
Payer: COMMERCIAL

## 2018-04-02 ENCOUNTER — OFFICE VISIT (OUTPATIENT)
Dept: SPORTS MEDICINE | Facility: CLINIC | Age: 43
End: 2018-04-02
Payer: COMMERCIAL

## 2018-04-02 VITALS
WEIGHT: 240 LBS | BODY MASS INDEX: 36.37 KG/M2 | HEART RATE: 65 BPM | SYSTOLIC BLOOD PRESSURE: 133 MMHG | HEIGHT: 68 IN | DIASTOLIC BLOOD PRESSURE: 87 MMHG

## 2018-04-02 DIAGNOSIS — M23.91 ACUTE INTERNAL DERANGEMENT OF RIGHT KNEE: ICD-10-CM

## 2018-04-02 DIAGNOSIS — M25.561 RIGHT KNEE PAIN, UNSPECIFIED CHRONICITY: ICD-10-CM

## 2018-04-02 DIAGNOSIS — M17.11 PRIMARY OSTEOARTHRITIS OF RIGHT KNEE: ICD-10-CM

## 2018-04-02 DIAGNOSIS — M25.561 RIGHT KNEE PAIN, UNSPECIFIED CHRONICITY: Primary | ICD-10-CM

## 2018-04-02 PROCEDURE — 99204 OFFICE O/P NEW MOD 45 MIN: CPT | Mod: S$GLB,,, | Performed by: ORTHOPAEDIC SURGERY

## 2018-04-02 PROCEDURE — 73564 X-RAY EXAM KNEE 4 OR MORE: CPT | Mod: 26,RT,, | Performed by: RADIOLOGY

## 2018-04-02 PROCEDURE — 99999 PR PBB SHADOW E&M-EST. PATIENT-LVL III: CPT | Mod: PBBFAC,,, | Performed by: ORTHOPAEDIC SURGERY

## 2018-04-02 PROCEDURE — 73564 X-RAY EXAM KNEE 4 OR MORE: CPT | Mod: TC,50,FY,PO

## 2018-04-02 PROCEDURE — 73564 X-RAY EXAM KNEE 4 OR MORE: CPT | Mod: 26,LT,, | Performed by: RADIOLOGY

## 2018-04-02 RX ORDER — TRAMADOL HYDROCHLORIDE 50 MG/1
50 TABLET ORAL
COMMUNITY
End: 2018-10-05

## 2018-04-02 NOTE — PROGRESS NOTES
"CC: Right knee pain    42 y.o. Female from 2 hrs away in [place], works at [employer] here reports knee pain refractory to conservative mgmt. Pt reports right knee pain beginning July 2017 after tripping. Reports that an outside MRI showed medial meniscus tear. Went through PT and used tramadol for this. Also received a corticosteroid injection without relief. Denies mechanical symptoms.     She reports that the pain is worse with deep squats.  It also bothers her at night.    Is affecting ADLs.     + mechanical symptoms, no instability    Review of Systems   Constitution: Negative. Negative for chills, fever and night sweats.   HENT: Negative for congestion and headaches.    Eyes: Negative for blurred vision, left vision loss and right vision loss.   Cardiovascular: Negative for chest pain and syncope.   Respiratory: Negative for cough and shortness of breath.    Endocrine: Negative for polydipsia, polyphagia and polyuria.   Hematologic/Lymphatic: Negative for bleeding problem. Does not bruise/bleed easily.   Skin: Negative for dry skin, itching and rash.   Musculoskeletal: Negative for falls and muscle weakness.   Gastrointestinal: Negative for abdominal pain and bowel incontinence.   Genitourinary: Negative for bladder incontinence and nocturia.   Neurological: Negative for disturbances in coordination, loss of balance and seizures.   Psychiatric/Behavioral: Negative for depression. The patient does not have insomnia.    Allergic/Immunologic: Negative for hives and persistent infections.     PAST MEDICAL HISTORY:   Past Medical History:   Diagnosis Date    Arthritis     Back pain     Cervical disc syndrome     Depression     Essential tremor     General anesthetics causing adverse effect in therapeutic use     patient reports she was told she "woke up" during tubal ligation    Goiter     MNG    Hypothyroidism     Lumbar disc disease     Patient is Latter day     Polycystic ovaries     PONV " (postoperative nausea and vomiting)     Vitamin D deficiency      PAST SURGICAL HISTORY:   Past Surgical History:   Procedure Laterality Date    APPENDECTOMY      DILATION AND CURETTAGE OF UTERUS      HYSTERECTOMY  01/30/2017    DLH ov in situ     OVARIAN CYST SURGERY Right     SHOULDER SURGERY      right    tubal lig  1998     FAMILY HISTORY:   Family History   Problem Relation Age of Onset    Cancer Mother 50     breast    COPD Father     Peripheral vascular disease Father     Asperger's syndrome Daughter     Thyroid disease Daughter      Hashimotos'     SOCIAL HISTORY:   Social History     Social History    Marital status:      Spouse name: N/A    Number of children: N/A    Years of education: N/A     Occupational History    Not on file.     Social History Main Topics    Smoking status: Former Smoker     Packs/day: 0.10     Years: 5.00     Types: Cigarettes     Quit date: 7/9/1992    Smokeless tobacco: Never Used    Alcohol use 0.6 - 1.2 oz/week     1 - 2 Glasses of wine per week      Comment: daily    Drug use: No    Sexual activity: Not Currently     Partners: Male      Comment:  to Mane      Other Topics Concern    Not on file     Social History Narrative    No narrative on file       MEDICATIONS:   Current Outpatient Prescriptions:     alprazolam (XANAX) 0.5 MG tablet, Take 0.25 mg by mouth., Disp: , Rfl:     cholecalciferol, vitamin D3, (VITAMIN D3) 1,000 unit capsule, Take 5,000 Units by mouth., Disp: , Rfl:     estradiol (ESTRACE) 2 MG tablet, TAKE 1 TABLET(2 MG) BY MOUTH EVERY DAY, Disp: 90 tablet, Rfl: 6    levothyroxine (SYNTHROID) 112 MCG tablet, Take 1 tablet (112 mcg total) by mouth once daily., Disp: 90 tablet, Rfl: 2    traMADol (ULTRAM) 50 mg tablet, Take 50 mg by mouth as needed for Pain., Disp: , Rfl:     vitamin E 1000 UNIT capsule, Take 1,000 Units by mouth., Disp: , Rfl:     azithromycin (Z-PAOLO) 250 MG tablet, , Disp: , Rfl: 0    carisoprodol  "(SOMA) 350 MG tablet, Take 350 mg by mouth., Disp: , Rfl:     dexamethasone (DECADRON) 0.75 MG Tab, TK 1 T PO TID FOR 5 DAYS, Disp: , Rfl: 0    sulfamethoxazole-trimethoprim 800-160mg (BACTRIM DS) 800-160 mg Tab, TK 1 T PO BID FOR 10 DAYS, Disp: , Rfl: 0  ALLERGIES:   Review of patient's allergies indicates:   Allergen Reactions    Iodine and iodide containing products Rash     Eats shellfish.  Eats shellfish.    Meloxicam Hives     Can take ibuprofen       VITAL SIGNS: /87   Pulse 65   Ht 5' 8" (1.727 m)   Wt 108.9 kg (240 lb)   LMP 01/15/2017   BMI 36.49 kg/m²        PHYSICAL EXAMINATION    General:  The patient is alert and oriented x 3.  Mood is pleasant.  Observation of ears, eyes and nose reveal no gross abnormalities.  HEENT: NCAT, sclera nonicteric  Lungs: Respirations are equal and unlabored.      Right KNEE EXAMINATION     OBSERVATION / INSPECTION   Gait:   Nonantalgic   Alignment:  Neutral   Scars:   None   Muscle atrophy: Mild  Effusion:  None   Warmth:  None   Discoloration:   none     TENDERNESS / CREPITUS (T / C):          T / C      T / C   Patella   - / -   Lateral joint line   + / -   Peripatellar medial  -  Medial joint line    + / -   Peripatellar lateral -  Medial plica   - / -   Patellar tendon -   Popliteal fossa  - / -   Quad tendon   -   Gastrocnemius   -   Prepatellar Bursa - / -   Quadricep   -   Tibial tubercle  -  Thigh/hamstring  -   Pes anserine/HS -  Fibula    -   ITB   - / -  Tibia     -   Tib/fib joint  - / -  LCL    -     MFC   - / -   MCL: Proximal  -    LFC   - / -    Distal   -          ROM: (* = pain)  PASSIVE   ACTIVE    Left :   5 / 0 / 125   5 / 0 / 125     Right :    5 / 0 / 125   5 / 0 / 125    Patellofemoral examination:  See above noted areas of tenderness.   Patella position    Subluxation / dislocation: Centered           Sup. / Inf;   Normal   Crepitus (PF):    Absent   Patellar Mobility:       Medial-lateral:   Normal    Superior-inferior:  Normal "    Inferior tilt   Normal    Patellar tendon:  Normal   Lateral tilt:    Normal   J-sign:     None   Patellofemoral grind:   No pain       MENISCAL SIGNS:     Pain on terminal extension:  +  Pain on terminal flexion:  +  Kings maneuver:  + for pain  Squat     + posterior joint pain    LIGAMENT EXAMINATION:  ACL / Lachman:  normal (-1 to 2mm)    PCL-Post.  drawer: normal 0 to 2mm  MCL- Valgus:  normal 0 to 2mm  LCL- Varus:  normal 0 to 2mm  Pivot shift: normal (Equal)   Dial Test: difference c/w other side   At 30° flexion: normal (< 5°)    At 90° flexion: normal (< 5°)   Reverse Pivot Shift:   normal (Equal)     STRENGTH: (* = with pain) PAINFUL SIDE   Quadricep   5/5   Hamstrin/5    EXTREMITY NEURO-VASCULAR EXAMINATION:   Sensation:  Grossly intact to light touch all dermatomal regions.   Motor Function:  Fully intact motor function at hip, knee, foot and ankle    DTRs;  quadriceps and  achilles 2+.  No clonus and downgoing Babinski.    Vascular status:  DP and PT pulses 2+, brisk capillary refill, symmetric.     Other Findings:          Xrays: ordered and reviewed personally by me (standing AP/flexion, lateral, sunrise) show: no evidence of arthritis or fracture or dislocation     MRI from outside facility:  17  1. Moderate grade partial thickness chondromalacia of the central patella  2. Superficial posterior medial femoral chondral thinning  3. Potential inferior fraying of the posterior horn of the medial meniscus    ASSESSMENT:    Right Knee internal derangement    PLAN:   1. PT for knee, hip/core  2. [id number] for Euflexxa series

## 2018-04-17 ENCOUNTER — CLINICAL SUPPORT (OUTPATIENT)
Dept: REHABILITATION | Facility: HOSPITAL | Age: 43
End: 2018-04-17
Attending: ORTHOPAEDIC SURGERY
Payer: COMMERCIAL

## 2018-04-17 DIAGNOSIS — G89.29 CHRONIC PAIN OF RIGHT KNEE: Primary | ICD-10-CM

## 2018-04-17 DIAGNOSIS — M25.561 CHRONIC PAIN OF RIGHT KNEE: Primary | ICD-10-CM

## 2018-04-17 PROCEDURE — 97110 THERAPEUTIC EXERCISES: CPT

## 2018-04-17 PROCEDURE — 97161 PT EVAL LOW COMPLEX 20 MIN: CPT

## 2018-04-18 NOTE — PLAN OF CARE
"[facility]   PATIENT EVALUATION    Date: 04/17/2018  Start Time: 11:10  Stop Time: 1200  Visit #:    Patient Name: Ninfa Aguilar  Clinic Number: [id number]  Age: 42 y.o.  Gender: female    Diagnosis: No diagnosis found.    Referring Physician: Felton Rosen MD  Treatment Orders: PT Eval and Treat      History     Past Medical History:   Diagnosis Date    Arthritis     Back pain     Cervical disc syndrome     Depression     Essential tremor     General anesthetics causing adverse effect in therapeutic use     patient reports she was told she "woke up" during tubal ligation    Goiter     MNG    Hypothyroidism     Lumbar disc disease     Patient is Latter day     Polycystic ovaries     PONV (postoperative nausea and vomiting)     Vitamin D deficiency        Current Outpatient Prescriptions   Medication Sig    alprazolam (XANAX) 0.5 MG tablet Take 0.25 mg by mouth.    azithromycin (Z-PAOLO) 250 MG tablet     carisoprodol (SOMA) 350 MG tablet Take 350 mg by mouth.    cholecalciferol, vitamin D3, (VITAMIN D3) 1,000 unit capsule Take 5,000 Units by mouth.    dexamethasone (DECADRON) 0.75 MG Tab TK 1 T PO TID FOR 5 DAYS    estradiol (ESTRACE) 2 MG tablet TAKE 1 TABLET(2 MG) BY MOUTH EVERY DAY    levothyroxine (SYNTHROID) 112 MCG tablet Take 1 tablet (112 mcg total) by mouth once daily.    sulfamethoxazole-trimethoprim 800-160mg (BACTRIM DS) 800-160 mg Tab TK 1 T PO BID FOR 10 DAYS    traMADol (ULTRAM) 50 mg tablet Take 50 mg by mouth as needed for Pain.    vitamin E 1000 UNIT capsule Take 1,000 Units by mouth.     No current facility-administered medications for this visit.        Review of patient's allergies indicates:   Allergen Reactions    Iodine and iodide containing products Rash     Eats shellfish.  Eats shellfish.    Meloxicam Hives     Can take ibuprofen         Subjective     History of Present Condition: Pt reports chronic knee " pain that has worsened recently she reports two previous falls. Pt had MRI done at another facility that showed fraying of meniscus and articualr cartilage damage. She reports anuj tshe went to [facility] and would like to try aqutic therapy    Onset Date: Chronic  Date of Surgery: NA  Precautions: NA    Mechanism of Injury: insidious    Pain Location: knee   Pain Description: Aching and Sharp  Current Pain: 3/10  Least Pain: 1/10  Worst Pain: 7/10  Aggravating Factors: walking, bending  Relieving Factors: rest    Diagnostic Tests: MRI at previous facility  Prior Therapy:     Occupation:   Job Status: walking, lifting  Job Duties: none    Sports/Recreational Activities: none  Extremity Dominance: R    Prior Level of Function: Independent  Functional Deficits Leading to Referral/Nature of Injury: pain with ADLs, decrease ability to ambulate pain free  Patient Therapy Goals: return to walking and biking with decreased pain  Cultural/Environmental/Spiritual Barriers to Treatment or Learning: none      Objective     Observation: Pt entered with no AD or brace. Limp noted. Pt had no swelling, bruising    Palpation: TTP over anterior knee, ITB, hamstring    Range of Motion:     Right Knee - 0-5-125    Left Knee: 0-130     Patella Mobility: normal  Right Knee:    Left Knee:      Flexibility:   +Hamstring  +Hip Flexor  +Quad  +GSS  +ITB    Strength:   Right Hip  Flexion: 5/5  Extension: 3/5  Abduction:3/5    Left Hip  Flexion: 5/5  Extension: 3/5   Abduction:3/5    Right Knee  Flexion: 3/5  Extension:3/5    Left Knee  Flexion: 5/5  Extension:5/5    Special Tests:   Negative:  Anterior Drawer  Posterior Drawer  Valgus Laxity  Varus Laxity    +McMurrays  +Hyperext OP    Other:     Treatment:     HSS 5:30sec  Prone quad stretch 5:30sec  Clamshells 3 x 10  Bridges 3 x 10 x 5 sh    Functional Limitations Reports - G Codes  Category: Mobility  Tool: FOTO  Score: 38        Assessment     This is a 42 y.o. female referred to  outpatient physical therapy and presents with a medical diagnosis of knee pain and demonstrates limitations as described in the problem list. Pt demonstrates good rehab potential. Pt will benefit from physcial therapy services in order to maximize pain free and/or functional use of right knee. The following goals were discussed with the patient and patient is in agreement with them as to be addressed in the treatment plan. Pt was given a HEP consisting of     HSS 5:30sec  Prone quad stretch 5:30sec  Clamshells 3 x 10  Bridges 3 x 10 x 5 sh      . Pt verbally understood the instructions as they were given and demonstrated proper form and technique during therapy. Pt was advised to perform these exercises free of pain, and to stop performing them if pain occurs.     Medical necessity is demonstrated by the following problem list:   - Pain limits function of effected part for all activities  - Unable to participate in daily activities   - Requires skilled supervision to complete and progress HEP  - Fall risk - impaired balance   - Continued inability to participate in vocational pursuits    Short Term Goals (6-7 Weeks):  - Pt will increase ext  ROM to equal contralateral wall   - Pt will increase hip abd strength to 4/5  - Decrease Pain to no higher than 2/10 with walking stairs  - Pt to self correct posture with step down   - Pt independent with HEP with progressions.     Long Term Goals (12-14 Weeks):  - Pt will increase ext  ROM to equal contralateral wall   - Pt will increase hip abd strength to 5/5  - Decrease Pain to no higher than 2/10 with walking < 30 min  - Pt to improve FOTO score to > 56      Plan     Pt will be treated by physical therapy 1-2 times a week for 12-14 weeks for manual therapy, therapeutic exercise, home exercise program, patient education, and modalities PRN to achieve established goals. Pt will benefit from aquatic therapy specifically to train in weight decreased environment to perform  therex with less pain. Ninfa may at times be seen by a PTA as part of the Rehab Team.     Maite Duong PT, DPT  04/17/2018    I CERTIFY THE NEED FOR THESE SERVICES FURNISHED UNDER THIS PLAN OF TREATMENT AND WHILE UNDER MY CAR  Physician's comments: _____________________________________________________________________________________________________________________    Physician's Name: ___________________________________

## 2018-04-26 ENCOUNTER — HOSPITAL ENCOUNTER (OUTPATIENT)
Dept: RADIOLOGY | Facility: HOSPITAL | Age: 43
Discharge: HOME | End: 2018-04-26
Attending: OBSTETRICS & GYNECOLOGY
Payer: COMMERCIAL

## 2018-04-26 ENCOUNTER — CLINICAL SUPPORT (OUTPATIENT)
Dept: REHABILITATION | Facility: HOSPITAL | Age: 43
End: 2018-04-26
Attending: ORTHOPAEDIC SURGERY
Payer: COMMERCIAL

## 2018-04-26 DIAGNOSIS — R92.8 ABNORMAL MAMMOGRAM: ICD-10-CM

## 2018-04-26 DIAGNOSIS — M25.561 CHRONIC PAIN OF RIGHT KNEE: Primary | ICD-10-CM

## 2018-04-26 DIAGNOSIS — G89.29 CHRONIC PAIN OF RIGHT KNEE: Primary | ICD-10-CM

## 2018-04-26 PROCEDURE — 76642 ULTRASOUND BREAST LIMITED: CPT | Mod: TC,PO,LT

## 2018-04-26 PROCEDURE — 77066 DX MAMMO INCL CAD BI: CPT | Mod: TC,PO

## 2018-04-26 PROCEDURE — 76642 ULTRASOUND BREAST LIMITED: CPT | Mod: 26,LT,, | Performed by: RADIOLOGY

## 2018-04-26 PROCEDURE — 97113 AQUATIC THERAPY/EXERCISES: CPT

## 2018-04-26 PROCEDURE — 77066 DX MAMMO INCL CAD BI: CPT | Mod: 26,,, | Performed by: RADIOLOGY

## 2018-04-26 PROCEDURE — 77062 BREAST TOMOSYNTHESIS BI: CPT | Mod: 26,,, | Performed by: RADIOLOGY

## 2018-04-26 NOTE — PROGRESS NOTES
Physical Therapy Daily Note     Date: 04/26/2018  Name: Ninfa Aguilar  Clinic Number: [id number]  Diagnosis: No diagnosis found.  Physician: Felton Rosen MD      Visit #: 2   Start Time:  1050  Stop Time:  1125  Total Treatment Time: 35        Subjective     Pt reports her right knee is sore but her stretches have been helping a little bit.    Pain: 3/10    Objective     Aquatic Therapy:    Warm up laps x 3 each  : Fwd/Bck/Lateral    LE strengthening  20 reps:  - Squat  - Heel raise  - Hip Ext BTB  - Hip Abd BTB  - SLS 3x30 sec  - Step ups x 20  - Fwd Lunge   - Lateral lunge  - HSS  - QS  - ITB stretch    Cool down lap x 3    Assessment     Pt became fatigued and had some slight discomfort with step up activity. She was instructed in ITB stretching which she reports felt like it pinched a nerve. She was then instructed in a seated Piriformis stretch which resolved neural sensation. Pt is definitely appropriate for aquatic therapy and should continue to benefit from strengthening in an un-weighted environment.     Will the patient continue to benefit from skilled PT intervention? Yes        Medical necessity is demonstrated by:   - Pain limits function of effected part for all activities  - Unable to participate in daily activities   - Requires skilled supervision to complete and progress HEP  - Fall risk - impaired balance   - Continued inability to participate in vocational pursuits    Progress towards goals: Good    New/Revised Goals:None       Plan   Continue with established Plan of Care towards PT goals.      Therapist: Julio Wilcox, PT, DPT, OCS

## 2018-05-03 ENCOUNTER — CLINICAL SUPPORT (OUTPATIENT)
Dept: REHABILITATION | Facility: HOSPITAL | Age: 43
End: 2018-05-03
Attending: ORTHOPAEDIC SURGERY
Payer: COMMERCIAL

## 2018-05-03 DIAGNOSIS — M25.561 CHRONIC PAIN OF RIGHT KNEE: Primary | ICD-10-CM

## 2018-05-03 DIAGNOSIS — G89.29 CHRONIC PAIN OF RIGHT KNEE: Primary | ICD-10-CM

## 2018-05-03 PROCEDURE — 97113 AQUATIC THERAPY/EXERCISES: CPT

## 2018-05-03 NOTE — PROGRESS NOTES
"                                                                                                           Aquatic Progress Note      Total treatment time: 60    Time In: 3:00  Time Out: 4:00    Subjective  Ninfa states "that her knee pn is 4/10 today, reports performing HEP      Objective    Treatment: Ninfa was instructed in and performed therapeutic exercises to develop strength, endurance, ROM, flexibility, balance, posture and core stabilization for 60 minutes. Patient performed therapeutic exercises consisting of warm up laps without resistance, PROM/Stretching, LE strengthening, balance exercises, isometrics, Endurance, bike, march, step-ups and cool down.    Warm-up Laps 3 x each  FWD,BWD,Side    Stretches 3 x 20 sec  HSS  Quad   GSS  ITB    LE exs 20x each  Mini Squat with QS  Heel Raise with GS  Hip flex/ext  Hip ABD/ADD  HS Curl  Hiip Circles CW/CCW  Step-ups  LAQ  SLS 3 x 30 sec    Endurance 4 min each  Bicycle on orange noodle  Marching    Cool down Laps 1 x each   FWD,BWD,Side     Patient was not issued HEP for pool.      Assessment  Patient's tolerance to treatment was good, no c/o pn throughout tx. good bal with SLS.This is a 42 y.o. female referred to outpatient physical therapy and presents with a medical diagnosis of knee pain and demonstrates limitations as described in the problem list. Pt demonstrates good rehab potential. Pt will benefit from physcial therapy services in order to maximize pain free and/or functional use of right knee Pt is definitely appropriate for aquatic therapy and should continue to benefit from strengthening in an un-weighted environment.   Patient will continue to benefit from skilled PT intervention.    Ninfa is making good progress towards established goals.      Plan  Continue 2x per week.    "

## 2018-05-10 ENCOUNTER — OFFICE VISIT (OUTPATIENT)
Dept: OBSTETRICS AND GYNECOLOGY | Facility: CLINIC | Age: 43
End: 2018-05-10
Payer: COMMERCIAL

## 2018-05-10 VITALS
BODY MASS INDEX: 36.69 KG/M2 | WEIGHT: 242.06 LBS | DIASTOLIC BLOOD PRESSURE: 80 MMHG | SYSTOLIC BLOOD PRESSURE: 110 MMHG | HEIGHT: 68 IN

## 2018-05-10 DIAGNOSIS — Z12.72 VAGINAL PAP SMEAR: ICD-10-CM

## 2018-05-10 DIAGNOSIS — N95.1 MENOPAUSAL SYMPTOMS: ICD-10-CM

## 2018-05-10 DIAGNOSIS — Z12.31 VISIT FOR SCREENING MAMMOGRAM: ICD-10-CM

## 2018-05-10 DIAGNOSIS — Z01.419 ENCOUNTER FOR GYNECOLOGICAL EXAMINATION WITHOUT ABNORMAL FINDING: Primary | ICD-10-CM

## 2018-05-10 PROCEDURE — 99396 PREV VISIT EST AGE 40-64: CPT | Mod: S$GLB,,, | Performed by: OBSTETRICS & GYNECOLOGY

## 2018-05-10 PROCEDURE — 99999 PR PBB SHADOW E&M-EST. PATIENT-LVL III: CPT | Mod: PBBFAC,,, | Performed by: OBSTETRICS & GYNECOLOGY

## 2018-05-10 PROCEDURE — 88175 CYTOPATH C/V AUTO FLUID REDO: CPT

## 2018-05-10 RX ORDER — ESTRADIOL 2 MG/1
2 TABLET ORAL DAILY
Qty: 90 TABLET | Refills: 3 | Status: SHIPPED | OUTPATIENT
Start: 2018-05-10 | End: 2018-10-05

## 2018-05-10 RX ORDER — ALPRAZOLAM 0.5 MG/1
TABLET ORAL
COMMUNITY
End: 2018-10-05

## 2018-05-10 RX ORDER — METHOCARBAMOL 750 MG/1
TABLET, FILM COATED ORAL
COMMUNITY
End: 2018-05-10

## 2018-05-10 RX ORDER — ALPRAZOLAM 1 MG/1
TABLET ORAL
Refills: 5 | COMMUNITY
Start: 2018-05-05 | End: 2022-11-15

## 2018-05-10 RX ORDER — CARISOPRODOL 350 MG/1
TABLET ORAL
COMMUNITY
End: 2020-02-05

## 2018-05-10 RX ORDER — HYDROCODONE BITARTRATE AND ACETAMINOPHEN 7.5; 325 MG/1; MG/1
TABLET ORAL
COMMUNITY
End: 2018-05-10

## 2018-05-10 RX ORDER — THYROID 90 MG/1
TABLET ORAL
COMMUNITY
End: 2018-05-10

## 2018-05-10 RX ORDER — PRIMIDONE 50 MG/1
TABLET ORAL
COMMUNITY
End: 2018-05-10

## 2018-05-17 ENCOUNTER — CLINICAL SUPPORT (OUTPATIENT)
Dept: REHABILITATION | Facility: HOSPITAL | Age: 43
End: 2018-05-17
Attending: ORTHOPAEDIC SURGERY
Payer: COMMERCIAL

## 2018-05-17 DIAGNOSIS — G89.29 CHRONIC PAIN OF RIGHT KNEE: Primary | ICD-10-CM

## 2018-05-17 DIAGNOSIS — M25.561 CHRONIC PAIN OF RIGHT KNEE: Primary | ICD-10-CM

## 2018-05-17 PROCEDURE — 97113 AQUATIC THERAPY/EXERCISES: CPT

## 2018-05-17 NOTE — PROGRESS NOTES
Aquatic Progress Note      Total treatment time: 60    Time In: 1:00  Time Out: 2:00    Subjective  Ninfa  is w/o c/o knee pn today, states that she feels she is a little stronger, reports performing HEP      Objective    Treatment: Ninfa was instructed in and performed therapeutic exercises to develop strength, endurance, ROM, flexibility, balance, posture and core stabilization for 60 minutes. Patient performed therapeutic exercises consisting of warm up laps without resistance, PROM/Stretching, LE strengthening, balance exercises, isometrics, Endurance, bike, march, step-ups and cool down.    Warm-up Laps 3 x each  FWD,BWD,Side    Stretches 3 x 20 sec  HSS  Quad   GSS  ITB    LE exs 30x each  Mini Squat with QS  Heel Raise with GS  Hip flex/ext  Hip ABD/ADD  HS Curl  Hiip Circles CW/CCW  Step-ups  LAQ  SLS 3 x 30 sec    Endurance 4 min each  Bicycle on orange noodle  Marching    Cool down Laps 1 x each   FWD,BWD,Side     Patient was not issued HEP for pool.      Assessment  Patient's tolerance to treatment with progression of ther ex was good, no c/o pn throughout tx. good bal with SLS.This is a 42 y.o. female referred to outpatient physical therapy and presents with a medical diagnosis of knee pain and demonstrates limitations as described in the problem list. Pt demonstrates good rehab potential. Pt will benefit from physcial therapy services in order to maximize pain free and/or functional use of right knee Pt is definitely appropriate for aquatic therapy and should continue to benefit from strengthening in an un-weighted environment.   Patient will continue to benefit from skilled PT intervention.    Ninfa is making good progress towards established goals.      Plan  Continue 2x per week.

## 2018-05-23 ENCOUNTER — CLINICAL SUPPORT (OUTPATIENT)
Dept: REHABILITATION | Facility: HOSPITAL | Age: 43
End: 2018-05-23
Attending: ORTHOPAEDIC SURGERY
Payer: COMMERCIAL

## 2018-05-23 DIAGNOSIS — M25.561 CHRONIC PAIN OF RIGHT KNEE: Primary | ICD-10-CM

## 2018-05-23 DIAGNOSIS — G89.29 CHRONIC PAIN OF RIGHT KNEE: Primary | ICD-10-CM

## 2018-05-23 PROCEDURE — 97110 THERAPEUTIC EXERCISES: CPT

## 2018-05-23 NOTE — PROGRESS NOTES
Physical Therapy Daily Note     Date: 05/23/2018  Name: Ninfa Aguilar  Clinic Number: [id number]  Diagnosis:   Encounter Diagnosis   Name Primary?    Chronic pain of right knee Yes     Physician: Felton Rosen MD    Evaluation Date: 4/17   Date of Surgery:   Return to MD Date:   Visit #: 5  Start Time:  2:00  Stop Time:  3:00  Total Treatment Time: 60      Subjective     Pt reports that she has been doing much better    Pain: 3/10 -general    Objective     Measurements taken:     Hip abd 5/5 B  Knee ext/flex 5/5 B    Average pain 3/10    R knee ROM 0-3-140    Patient received individual therapy to increase strength, endurance, ROM, flexibility, posture and core stabilization with activities as follows:     Ninfa received therapeutic exercises to develop strength, endurance, ROM, flex   HSS 5:30sec  Prone quad stretch 5:30sec  Clamshells 3 x 10  Bridges 3 x 10 x 5 sh    Education for HEP, manual therapy for knee    Ninfa received the following manual therapy techniques: Joint mobilizations and Soft tissue Mobilization were applied to the:  for 5 minutes.     Fat pad mobilizations     Written Home Exercises Provided: Yes  Pt demo good understanding of the education provided. Ninfa demonstrated good return demonstration of activities.     Education provided:  Ninfa verbalized good understanding of education provided.   No spiritual or educational barriers to learning identified.    Assessment     Pt demonstrated excellent strength and ROM improvements. Pt reports that she is comfortable performing all exercises in home pool. She will contact PT if any issues arise.     This is a 42 y.o. female referred to outpatient physical therapy and presents with a medical diagnosis of knee pain and demonstrates limitations as described in the problem list Pt prognosis is Good. Pt will continue to benefit from skilled outpatient physical therapy to address the  deficits listed below in the problem list, provide pt/family education and to maximize pt's level of independence in the home and community environment.    Will the patient continue to benefit from skilled PT intervention? Yes        Medical necessity is demonstrated by:   - Pain limits function of effected part for all activities  - Unable to participate in daily activities   - Requires skilled supervision to complete and progress HEP  - Fall risk - impaired balance   - Continued inability to participate in vocational pursuits    Progress towards goals:     New/Revised Goals:  Short Term Goals (6-7 Weeks):   - Pt will increase ext  ROM to equal contralateral limb  - Pt will increase hip abd strength to 4/5 MET  - Decrease Pain to no higher than 2/10 with walking stairs   - Pt to self correct posture with step down MET  - Pt independent with HEP with progressions. MET     Long Term Goals (12-14 Weeks):  - Pt will increase hip abd strength to 5/5 MET  - Decrease Pain to no higher than 2/10 with walking < 30 min  - Pt to improve FOTO score to > 56          Plan   D/c from my POC      Maite Duong, PT, DPT  05/23/2018

## 2018-07-02 ENCOUNTER — PATIENT MESSAGE (OUTPATIENT)
Dept: SPORTS MEDICINE | Facility: CLINIC | Age: 43
End: 2018-07-02

## 2018-07-19 ENCOUNTER — OFFICE VISIT (OUTPATIENT)
Dept: ORTHOPEDICS | Facility: CLINIC | Age: 43
End: 2018-07-19
Payer: COMMERCIAL

## 2018-07-19 ENCOUNTER — HOSPITAL ENCOUNTER (OUTPATIENT)
Dept: RADIOLOGY | Facility: HOSPITAL | Age: 43
Discharge: HOME | End: 2018-07-19
Attending: ORTHOPAEDIC SURGERY
Payer: COMMERCIAL

## 2018-07-19 VITALS
HEART RATE: 71 BPM | HEIGHT: 68 IN | DIASTOLIC BLOOD PRESSURE: 75 MMHG | WEIGHT: 244.19 LBS | BODY MASS INDEX: 37.01 KG/M2 | SYSTOLIC BLOOD PRESSURE: 128 MMHG

## 2018-07-19 DIAGNOSIS — M79.671 RIGHT FOOT PAIN: ICD-10-CM

## 2018-07-19 DIAGNOSIS — R22.41 MASS OF RIGHT FOOT: ICD-10-CM

## 2018-07-19 DIAGNOSIS — M79.671 RIGHT FOOT PAIN: Primary | ICD-10-CM

## 2018-07-19 PROCEDURE — 73630 X-RAY EXAM OF FOOT: CPT | Mod: 26,RT,, | Performed by: RADIOLOGY

## 2018-07-19 PROCEDURE — 99999 PR PBB SHADOW E&M-EST. PATIENT-LVL III: CPT | Mod: PBBFAC,,, | Performed by: ORTHOPAEDIC SURGERY

## 2018-07-19 PROCEDURE — 99203 OFFICE O/P NEW LOW 30 MIN: CPT | Mod: S$GLB,,, | Performed by: ORTHOPAEDIC SURGERY

## 2018-07-19 PROCEDURE — 3008F BODY MASS INDEX DOCD: CPT | Mod: CPTII,S$GLB,, | Performed by: ORTHOPAEDIC SURGERY

## 2018-07-19 PROCEDURE — 73630 X-RAY EXAM OF FOOT: CPT | Mod: TC,RT

## 2018-07-19 NOTE — PROGRESS NOTES
"Subjective:      Patient ID: Ninfa Aguilar is a 42 y.o. female.    Chief Complaint: Pain of the Right Foot    HPI   Ninfa Aguilar is a 42 y.o. year old female patient here for initial evaluation of right foot pain that has been present since March. She noticed a bump on the bottom of her foot at that time which has grown in size. The bump is hard and tender. Walking on it causes sharp pain that does not radiate. She has been unable to wear any shoes other than ones with a very soft sole. No history of trauma. No associated numbness or tingling.     Past Medical History:   Diagnosis Date    Abnormal Pap smear of cervix     Arthritis     Back pain     Cervical disc syndrome     Depression     Essential tremor     General anesthetics causing adverse effect in therapeutic use     patient reports she was told she "woke up" during tubal ligation    Goiter     MNG    Hypothyroidism     Lumbar disc disease     Patient is Congregational     Polycystic ovaries     PONV (postoperative nausea and vomiting)     Vitamin D deficiency      Past Surgical History:   Procedure Laterality Date    APPENDECTOMY      DILATION AND CURETTAGE OF UTERUS      [facility]  [facility]/2017    [facility]     OVARIAN CYST SURGERY Right     SHOULDER SURGERY      right    tubal lig  1998     Social History     Social History    Marital status:      Spouse name: N/A    Number of children: N/A    Years of education: N/A     Occupational History    Not on file.     Social History Main Topics    Smoking status: Former Smoker     Packs/day: 0.10     Years: 5.00     Types: Cigarettes     Quit date: 7/9/1992    Smokeless tobacco: Former User    Alcohol use 0.6 - 1.2 oz/week     1 - 2 Glasses of wine per week      Comment: daily    Drug use: No    Sexual activity: Yes     Partners: Male     Birth control/ protection: None      Comment:  to Mnae      Other Topics Concern    Not on file "     Social History Narrative    No narrative on file     Family History   Problem Relation Age of Onset    Cancer Mother 50        breast    Breast cancer Mother     COPD Father     Peripheral vascular disease Father     Asperger's syndrome Daughter     Thyroid disease Daughter         Hashimotos'       Current Outpatient Prescriptions:     alprazolam (XANAX) 0.5 MG tablet, Take 0.25 mg by mouth., Disp: , Rfl:     ALPRAZolam (XANAX) 0.5 MG tablet, Xanax 0.5 mg tablet  Take 1 tablet 3 times a day by oral route., Disp: , Rfl:     ALPRAZolam (XANAX) 1 MG tablet, TK 1 T PO  TID, Disp: , Rfl: 5    carisoprodol (SOMA) 350 MG tablet, Take 350 mg by mouth., Disp: , Rfl:     carisoprodol (SOMA) 350 MG tablet, Soma 350 mg tablet  Take 1 tablet every 6 hours by oral route as needed for 30 days., Disp: , Rfl:     cholecalciferol, vitamin D3, (VITAMIN D3) 1,000 unit capsule, Take 5,000 Units by mouth., Disp: , Rfl:     estradiol (ESTRACE) 2 MG tablet, Take 1 tablet (2 mg total) by mouth once daily., Disp: 90 tablet, Rfl: 3    levothyroxine (SYNTHROID) 112 MCG tablet, Take 1 tablet (112 mcg total) by mouth once daily., Disp: 90 tablet, Rfl: 2    traMADol (ULTRAM) 50 mg tablet, Take 50 mg by mouth as needed for Pain., Disp: , Rfl:     vitamin E 1000 UNIT capsule, Take 1,000 Units by mouth., Disp: , Rfl:     dexamethasone (DECADRON) 0.75 MG Tab, TK 1 T PO TID FOR 5 DAYS, Disp: , Rfl: 0  Review of patient's allergies indicates:   Allergen Reactions    Iodinated contrast- oral and iv dye     Iodine and iodide containing products Rash     Eats shellfish.  Eats shellfish.    Meloxicam Hives     Can take ibuprofen     Review of Systems   Constitution: Negative for chills and fever.   HENT: Negative for congestion and sore throat.    Eyes: Negative for double vision and redness.   Cardiovascular: Negative for chest pain.   Respiratory: Negative for cough and shortness of breath.    Endocrine: Negative for polyuria.    Skin: Negative for rash.   Musculoskeletal: Positive for joint pain.   Gastrointestinal: Negative for nausea and vomiting.   Genitourinary: Negative for dysuria and hematuria.   Neurological: Negative for headaches and light-headedness.   Psychiatric/Behavioral: Negative for altered mental status.   Allergic/Immunologic: Negative for hives.         Objective:      Ortho/SPM Exam  HEENT: normocephalic, atraumatic  Resp: no increased work of breathing  CV: regular rate and rhythm  MSK: moves b/l upper extremities well    Right lower extremity:  Skin intact  1 cm hard subcutaneous nodule along medial edge of plantar fascia towards the ball of the foot  No ecchymoses, erythema, or swelling  Sensation intact SP/DP/T/Sural/Saphenous nerves  Motor intact EHL/FHL/TA/gastroc  TTP over nodule  Palpable DP/PT pulses    Radiographs of right foot ordered and reviewed. No significant bony abnormalities.      Assessment:       Encounter Diagnoses   Name Primary?    Right foot pain Yes    Mass of right foot           Plan:       Ninfa was seen today for pain.    Diagnoses and all orders for this visit:    Right foot pain  -     X-Ray Foot Complete Right; Future  -     MRI Foot (Midfoot) Right With Contrast; Future    Mass of right foot  -     MRI Foot (Midfoot) Right With Contrast; Future    Presentation most consistent with plantar fibroma; however, these typically do not present with pain. It is possible that the tenderness is the result of irritation from shoe wear or from proximity to a plantar nerve. MRI ordered for further evaluation.      I have personally taken the history and examined this patient and agree with the residents note as stated above.

## 2018-07-19 NOTE — LETTER
July 20, 2018      Parris Garcias MD  [address]  [facility] [address]           [facility] Orthoped[facility]  [address]  [facility] [address]  Phone: [phone]          Patient: Ninfa Aguilar   MR Number: [id number]   YOB: 1975   Date of Visit: 7/19/2018       Dear Dr. Parris Garcias:    Thank you for referring Ninfa Aguilar to me for evaluation. Attached you will find relevant portions of my assessment and plan of care.    If you have questions, please do not hesitate to call me. I look forward to following Ninfa Aguilar along with you.    Sincerely,    Ha Caicedo MD    Enclosure  CC:  No Recipients    If you would like to receive this communication electronically, please contact [email or web address] or [phone] to request more information on [facility] [employer] access.    For providers and/or their staff who would like to refer a patient to [facility], please contact us through our one-stop-shop provider referral line, [facility], at [phone].    If you feel you have received this communication in error or would no longer like to receive these types of communications, please e-mail [email or web address]

## 2018-08-02 ENCOUNTER — HOSPITAL ENCOUNTER (OUTPATIENT)
Dept: RADIOLOGY | Facility: HOSPITAL | Age: 43
Discharge: HOME | End: 2018-08-02
Attending: ORTHOPAEDIC SURGERY
Payer: COMMERCIAL

## 2018-08-02 DIAGNOSIS — R22.41 MASS OF RIGHT FOOT: ICD-10-CM

## 2018-08-02 DIAGNOSIS — M79.671 RIGHT FOOT PAIN: ICD-10-CM

## 2018-08-02 PROCEDURE — A9585 GADOBUTROL INJECTION: HCPCS | Performed by: ORTHOPAEDIC SURGERY

## 2018-08-02 PROCEDURE — 73720 MRI LWR EXTREMITY W/O&W/DYE: CPT | Mod: TC,RT

## 2018-08-02 PROCEDURE — 73720 MRI LWR EXTREMITY W/O&W/DYE: CPT | Mod: 26,RT,, | Performed by: RADIOLOGY

## 2018-08-02 PROCEDURE — 25500020 PHARM REV CODE 255: Performed by: ORTHOPAEDIC SURGERY

## 2018-08-02 RX ORDER — GADOBUTROL 604.72 MG/ML
10 INJECTION INTRAVENOUS
Status: COMPLETED | OUTPATIENT
Start: 2018-08-02 | End: 2018-08-02

## 2018-08-02 RX ADMIN — GADOBUTROL 10 ML: 604.72 INJECTION INTRAVENOUS at 07:08

## 2018-08-16 ENCOUNTER — OFFICE VISIT (OUTPATIENT)
Dept: ORTHOPEDICS | Facility: CLINIC | Age: 43
End: 2018-08-16
Payer: COMMERCIAL

## 2018-08-16 VITALS — WEIGHT: 244 LBS | HEIGHT: 68 IN | BODY MASS INDEX: 36.98 KG/M2

## 2018-08-16 DIAGNOSIS — R22.41 MASS OF RIGHT FOOT: Primary | ICD-10-CM

## 2018-08-16 PROCEDURE — 3008F BODY MASS INDEX DOCD: CPT | Mod: CPTII,S$GLB,, | Performed by: ORTHOPAEDIC SURGERY

## 2018-08-16 PROCEDURE — 99999 PR PBB SHADOW E&M-EST. PATIENT-LVL II: CPT | Mod: PBBFAC,,, | Performed by: ORTHOPAEDIC SURGERY

## 2018-08-16 PROCEDURE — 99213 OFFICE O/P EST LOW 20 MIN: CPT | Mod: S$GLB,,, | Performed by: ORTHOPAEDIC SURGERY

## 2018-08-17 NOTE — PROGRESS NOTES
Ms. Aguilar returns today with results of her MRI.  This is a 42-year-old female who has noticed a lump on the bottom of her right foot since March and presented a few weeks ago with complaints of intermittent sharp pain related to this bump.  Physical exam was consistent with a probable plantar fibroma with significant tenderness to palpation.    MRI results:  Reveals approximately a 1 cm lesion consistent with a plantar fibroma.    Examination:  Remains unchanged with a tender nodule on the plantar medial aspect of her right foot    Impression:  Right foot probable plantar fibroma that is painful    Recommendation:  I explained to Ms. Aguilar that excision of these lesions has a high rate of recurrence but if she cannot tolerate her symptoms we could consider excision.  Typically these lesions are asymptomatic but impingement on adjacent structures could be a source of pain.    At this point she is going to think about the surgical option and otherwise continue with activities as tolerated and wearing soft supportive shoes.

## 2018-08-23 ENCOUNTER — PATIENT MESSAGE (OUTPATIENT)
Dept: ORTHOPEDICS | Facility: CLINIC | Age: 43
End: 2018-08-23

## 2018-08-27 ENCOUNTER — PATIENT MESSAGE (OUTPATIENT)
Dept: ORTHOPEDICS | Facility: CLINIC | Age: 43
End: 2018-08-27

## 2018-08-29 ENCOUNTER — PATIENT MESSAGE (OUTPATIENT)
Dept: ORTHOPEDICS | Facility: CLINIC | Age: 43
End: 2018-08-29

## 2018-09-23 DIAGNOSIS — N95.1 MENOPAUSAL SYMPTOMS: ICD-10-CM

## 2018-09-24 RX ORDER — ESTRADIOL 2 MG/1
TABLET ORAL
Qty: 90 TABLET | Refills: 0 | Status: SHIPPED | OUTPATIENT
Start: 2018-09-24 | End: 2019-05-16 | Stop reason: SDUPTHER

## 2018-09-27 ENCOUNTER — TELEPHONE (OUTPATIENT)
Dept: ORTHOPEDICS | Facility: CLINIC | Age: 43
End: 2018-09-27

## 2018-09-27 NOTE — TELEPHONE ENCOUNTER
Notified pt that SHANTI Williamson PA-C will not be in the office on 10/26/18. New appointment 10/23/18 @ 7:15am/mailed. Patient states verbal understanding and has no further questions.

## 2018-10-02 ENCOUNTER — TELEPHONE (OUTPATIENT)
Dept: SPORTS MEDICINE | Facility: CLINIC | Age: 43
End: 2018-10-02

## 2018-10-02 ENCOUNTER — PATIENT MESSAGE (OUTPATIENT)
Dept: SPORTS MEDICINE | Facility: CLINIC | Age: 43
End: 2018-10-02

## 2018-10-02 DIAGNOSIS — M17.11 OSTEOARTHRITIS OF RIGHT KNEE, UNSPECIFIED OSTEOARTHRITIS TYPE: Primary | ICD-10-CM

## 2018-10-04 ENCOUNTER — TELEPHONE (OUTPATIENT)
Dept: ORTHOPEDICS | Facility: CLINIC | Age: 43
End: 2018-10-04

## 2018-10-05 ENCOUNTER — OFFICE VISIT (OUTPATIENT)
Dept: ORTHOPEDICS | Facility: CLINIC | Age: 43
End: 2018-10-05
Payer: COMMERCIAL

## 2018-10-05 VITALS
HEIGHT: 68 IN | WEIGHT: 245.81 LBS | BODY MASS INDEX: 37.25 KG/M2 | HEART RATE: 69 BPM | DIASTOLIC BLOOD PRESSURE: 87 MMHG | SYSTOLIC BLOOD PRESSURE: 142 MMHG

## 2018-10-05 DIAGNOSIS — R22.41 MASS OF RIGHT FOOT: Primary | ICD-10-CM

## 2018-10-05 PROCEDURE — 99999 PR PBB SHADOW E&M-EST. PATIENT-LVL III: CPT | Mod: PBBFAC,,, | Performed by: PHYSICIAN ASSISTANT

## 2018-10-05 PROCEDURE — 99499 UNLISTED E&M SERVICE: CPT | Mod: S$GLB,,, | Performed by: PHYSICIAN ASSISTANT

## 2018-10-05 RX ORDER — HYDROCODONE BITARTRATE AND ACETAMINOPHEN 5; 325 MG/1; MG/1
1 TABLET ORAL
Qty: 42 TABLET | Refills: 0 | Status: SHIPPED | OUTPATIENT
Start: 2018-10-05 | End: 2018-11-12

## 2018-10-05 NOTE — H&P
"Subjective:      Patient ID: Ninfa Aguilar is a 42 y.o. female.    Chief Complaint: No chief complaint on file.    Ninfa is a 42 y.o. female here today for a pre-operative visit in preparation for a   EXCISION, MASS, FOOT probable fibroma- right   to be performed by  on 10/30/2018.  she was last seen and treated in the clinic on 2018. There has been no significant change in their past medical or orthopedic status since the previous visit.  No fever, chills, malaise or unexplained weight change.     Past Medical History:   Diagnosis Date    Abnormal Pap smear of cervix     Arthritis     Back pain     Cervical disc syndrome     Depression     Essential tremor     General anesthetics causing adverse effect in therapeutic use     patient reports she was told she "woke up" during tubal ligation    Goiter     MNG    Hypothyroidism     Lumbar disc disease     Patient is Jain     Polycystic ovaries     PONV (postoperative nausea and vomiting)     Vitamin D deficiency      Past Surgical History:   Procedure Laterality Date    APPENDECTOMY      DILATION AND CURETTAGE OF UTERUS      [facility]  2017    [facility]     OVARIAN CYST SURGERY Right     ROBOTIC ASSISTED LAPAROSCOPIC HYSTERECTOMY WITH POSSIBLE STAGING N/A 2017    Performed by Tr Roberts IV, [facility]    SALPINGECTOMY-ROBOTIC ASSISTED Bilateral 2017    [facility] at [facility]    SHOULDER SURGERY      right    tubal lig  1998     Social History     Occupational History    Not on file   Tobacco Use    Smoking status: Former Smoker     Packs/day: 0.10     Years: 5.00     Pack years: 0.50     Types: Cigarettes     Last attempt to quit: 1992     Years since quittin.2    Smokeless tobacco: Former User   Substance and Sexual Activity    Alcohol use: Yes     Alcohol/week: 0.6 - 1.2 oz     Types: 1 - 2 Glasses of wine per week     Comment: daily " "   Drug use: No    Sexual activity: Yes     Partners: Male     Birth control/protection: None     Comment:  to Mane GARCIA  Current Outpatient Medications on File Prior to Visit   Medication Sig Dispense Refill    ALPRAZolam (XANAX) 1 MG tablet TK 1 T PO  TID  5    cholecalciferol, vitamin D3, (VITAMIN D3) 1,000 unit capsule Take 5,000 Units by mouth.      estradiol (ESTRACE) 2 MG tablet TAKE 1 TABLET(2 MG) BY MOUTH EVERY DAY 90 tablet 0    levothyroxine (SYNTHROID) 112 MCG tablet Take 1 tablet (112 mcg total) by mouth once daily. 90 tablet 2    vitamin E 1000 UNIT capsule Take 1,000 Units by mouth.      [DISCONTINUED] alprazolam (XANAX) 0.5 MG tablet Take 0.25 mg by mouth.      [DISCONTINUED] ALPRAZolam (XANAX) 0.5 MG tablet Xanax 0.5 mg tablet   Take 1 tablet 3 times a day by oral route.      [DISCONTINUED] carisoprodol (SOMA) 350 MG tablet Take 350 mg by mouth.      [DISCONTINUED] estradiol (ESTRACE) 2 MG tablet Take 1 tablet (2 mg total) by mouth once daily. 90 tablet 3    [DISCONTINUED] traMADol (ULTRAM) 50 mg tablet Take 50 mg by mouth as needed for Pain.      carisoprodol (SOMA) 350 MG tablet Soma 350 mg tablet   Take 1 tablet every 6 hours by oral route as needed for 30 days.      [DISCONTINUED] dexamethasone (DECADRON) 0.75 MG Tab TK 1 T PO TID FOR 5 DAYS  0     No current facility-administered medications on file prior to visit.      Review of patient's allergies indicates:   Allergen Reactions    Iodinated contrast- oral and iv dye     Iodine and iodide containing products Rash     Eats shellfish.  Eats shellfish.    Meloxicam Hives     Can take ibuprofen         Objective:      Vitals:    10/05/18 1349   BP: (!) 142/87   BP Location: Right arm   Patient Position: Sitting   BP Method: Large (Automatic)   Pulse: 69   Weight: 111.5 kg (245 lb 13 oz)   Height: 5' 8" (1.727 m)     Ortho Exam     Gen: WD, WN in NAD   HEENT: NC/AT   Heart: RR   Resp: unlabored breathing   Skin: " intact, no lesions pertinent to the surgery site    Assessment:       1. Mass of right foot          Plan:       Surgical intervention per .

## 2018-10-05 NOTE — H&P (VIEW-ONLY)
"Subjective:      Patient ID: Ninfa Aguilar is a 42 y.o. female.    Chief Complaint: No chief complaint on file.    Ninfa is a 42 y.o. female here today for a pre-operative visit in preparation for a   EXCISION, MASS, FOOT probable fibroma- right   to be performed by  on 10/30/2018.  she was last seen and treated in the clinic on 2018. There has been no significant change in their past medical or orthopedic status since the previous visit.  No fever, chills, malaise or unexplained weight change.     Past Medical History:   Diagnosis Date    Abnormal Pap smear of cervix     Arthritis     Back pain     Cervical disc syndrome     Depression     Essential tremor     General anesthetics causing adverse effect in therapeutic use     patient reports she was told she "woke up" during tubal ligation    Goiter     MNG    Hypothyroidism     Lumbar disc disease     Patient is Mosque     Polycystic ovaries     PONV (postoperative nausea and vomiting)     Vitamin D deficiency      Past Surgical History:   Procedure Laterality Date    APPENDECTOMY      DILATION AND CURETTAGE OF UTERUS      HYSTERECTOMY  2017    [facility] ov [facility] situ     OVARIAN CYST SURGERY Right     ROBOTIC ASSISTED LAPAROSCOPIC HYSTERECTOMY WITH POSSIBLE STAGING N/A 2017    [facility]    SALPINGECTOMY-ROBOTIC ASSISTED Bilateral 2017    [facility] at [facility]    SHOULDER SURGERY      right    tubal lig  1998     Social History     Occupational History    Not on file   Tobacco Use    Smoking status: Former Smoker     Packs/day: 0.10     Years: 5.00     Pack years: 0.50     Types: Cigarettes     Last attempt to quit: 1992     Years since quittin.2    Smokeless tobacco: Former User   Substance and Sexual Activity    Alcohol use: Yes     Alcohol/week: 0.6 - 1.2 oz     Types: 1 - 2 Glasses of wine per week     Comment: daily " "   Drug use: No    Sexual activity: Yes     Partners: Male     Birth control/protection: None     Comment:  to Mane GARCIA  Current Outpatient Medications on File Prior to Visit   Medication Sig Dispense Refill    ALPRAZolam (XANAX) 1 MG tablet TK 1 T PO  TID  5    cholecalciferol, vitamin D3, (VITAMIN D3) 1,000 unit capsule Take 5,000 Units by mouth.      estradiol (ESTRACE) 2 MG tablet TAKE 1 TABLET(2 MG) BY MOUTH EVERY DAY 90 tablet 0    levothyroxine (SYNTHROID) 112 MCG tablet Take 1 tablet (112 mcg total) by mouth once daily. 90 tablet 2    vitamin E 1000 UNIT capsule Take 1,000 Units by mouth.      [DISCONTINUED] alprazolam (XANAX) 0.5 MG tablet Take 0.25 mg by mouth.      [DISCONTINUED] ALPRAZolam (XANAX) 0.5 MG tablet Xanax 0.5 mg tablet   Take 1 tablet 3 times a day by oral route.      [DISCONTINUED] carisoprodol (SOMA) 350 MG tablet Take 350 mg by mouth.      [DISCONTINUED] estradiol (ESTRACE) 2 MG tablet Take 1 tablet (2 mg total) by mouth once daily. 90 tablet 3    [DISCONTINUED] traMADol (ULTRAM) 50 mg tablet Take 50 mg by mouth as needed for Pain.      carisoprodol (SOMA) 350 MG tablet Soma 350 mg tablet   Take 1 tablet every 6 hours by oral route as needed for 30 days.      [DISCONTINUED] dexamethasone (DECADRON) 0.75 MG Tab TK 1 T PO TID FOR 5 DAYS  0     No current facility-administered medications on file prior to visit.      Review of patient's allergies indicates:   Allergen Reactions    Iodinated contrast- oral and iv dye     Iodine and iodide containing products Rash     Eats shellfish.  Eats shellfish.    Meloxicam Hives     Can take ibuprofen         Objective:      Vitals:    10/05/18 1349   BP: (!) 142/87   BP Location: Right arm   Patient Position: Sitting   BP Method: Large (Automatic)   Pulse: 69   Weight: 111.5 kg (245 lb 13 oz)   Height: 5' 8" (1.727 m)     Ortho Exam     Gen: WD, WN in NAD   HEENT: NC/AT   Heart: RR   Resp: unlabored breathing   Skin: " intact, no lesions pertinent to the surgery site    Assessment:       1. Mass of right foot          Plan:       Surgical intervention per .

## 2018-10-09 ENCOUNTER — PATIENT MESSAGE (OUTPATIENT)
Dept: ADMINISTRATIVE | Facility: OTHER | Age: 43
End: 2018-10-09

## 2018-10-29 ENCOUNTER — ANESTHESIA EVENT (OUTPATIENT)
Dept: SURGERY | Facility: HOSPITAL | Age: 43
End: 2018-10-29
Payer: COMMERCIAL

## 2018-10-29 ENCOUNTER — TELEPHONE (OUTPATIENT)
Dept: ORTHOPEDICS | Facility: CLINIC | Age: 43
End: 2018-10-29

## 2018-10-29 NOTE — PRE-PROCEDURE INSTRUCTIONS
PreOp Instructions given:   - Verbal medication information (what to hold and what to take)   - NPO guidelines   - Arrival place directions given; time to be given the day before procedure by the   Surgeon's Office   - Bathing with antibacterial soap   - Don't wear any jewelry or bring any valuables AM of surgery   - No makeup or moisturizer to face   - No perfume/cologne, powder, lotions or aftershave   Pt. verbalized understanding.       Possible awareness under anesthesia 1998, didn't bother her

## 2018-10-29 NOTE — TELEPHONE ENCOUNTER
Received a return call from pt.   Advised NPO after midnight.   Arrival time of 530 am tomorrow.  Pt verbalized understanding

## 2018-10-30 ENCOUNTER — ANESTHESIA (OUTPATIENT)
Dept: SURGERY | Facility: HOSPITAL | Age: 43
End: 2018-10-30
Payer: COMMERCIAL

## 2018-10-30 ENCOUNTER — HOSPITAL ENCOUNTER (OUTPATIENT)
Facility: HOSPITAL | Age: 43
Discharge: HOME | End: 2018-10-30
Attending: ORTHOPAEDIC SURGERY | Admitting: ORTHOPAEDIC SURGERY
Payer: COMMERCIAL

## 2018-10-30 DIAGNOSIS — R22.41 MASS OF RIGHT FOOT: Primary | ICD-10-CM

## 2018-10-30 PROCEDURE — 63600175 PHARM REV CODE 636 W HCPCS: Performed by: ORTHOPAEDIC SURGERY

## 2018-10-30 PROCEDURE — 28043 EXC FOOT/TOE TUM SC < 1.5 CM: CPT | Mod: RT,,, | Performed by: ORTHOPAEDIC SURGERY

## 2018-10-30 PROCEDURE — D9220A PRA ANESTHESIA: Mod: ANES,,, | Performed by: ANESTHESIOLOGY

## 2018-10-30 PROCEDURE — 36000707: Performed by: ORTHOPAEDIC SURGERY

## 2018-10-30 PROCEDURE — 71000044 HC DOSC ROUTINE RECOVERY FIRST HOUR: Performed by: ORTHOPAEDIC SURGERY

## 2018-10-30 PROCEDURE — 37000008 HC ANESTHESIA 1ST 15 MINUTES: Performed by: ORTHOPAEDIC SURGERY

## 2018-10-30 PROCEDURE — 63600175 PHARM REV CODE 636 W HCPCS: Performed by: ANESTHESIOLOGY

## 2018-10-30 PROCEDURE — 25000003 PHARM REV CODE 250

## 2018-10-30 PROCEDURE — 25000003 PHARM REV CODE 250: Performed by: NURSE ANESTHETIST, CERTIFIED REGISTERED

## 2018-10-30 PROCEDURE — 71000015 HC POSTOP RECOV 1ST HR: Performed by: ORTHOPAEDIC SURGERY

## 2018-10-30 PROCEDURE — 36000706: Performed by: ORTHOPAEDIC SURGERY

## 2018-10-30 PROCEDURE — D9220A PRA ANESTHESIA: Mod: CRNA,,, | Performed by: NURSE ANESTHETIST, CERTIFIED REGISTERED

## 2018-10-30 PROCEDURE — 37000009 HC ANESTHESIA EA ADD 15 MINS: Performed by: ORTHOPAEDIC SURGERY

## 2018-10-30 PROCEDURE — 25000003 PHARM REV CODE 250: Performed by: STUDENT IN AN ORGANIZED HEALTH CARE EDUCATION/TRAINING PROGRAM

## 2018-10-30 PROCEDURE — 25000003 PHARM REV CODE 250: Performed by: ANESTHESIOLOGY

## 2018-10-30 PROCEDURE — S0020 INJECTION, BUPIVICAINE HYDRO: HCPCS | Performed by: ANESTHESIOLOGY

## 2018-10-30 PROCEDURE — 88305 TISSUE EXAM BY PATHOLOGIST: CPT | Mod: 26,,, | Performed by: PATHOLOGY

## 2018-10-30 PROCEDURE — 71000016 HC POSTOP RECOV ADDL HR: Performed by: ORTHOPAEDIC SURGERY

## 2018-10-30 PROCEDURE — 25000003 PHARM REV CODE 250: Performed by: ORTHOPAEDIC SURGERY

## 2018-10-30 PROCEDURE — 63600175 PHARM REV CODE 636 W HCPCS: Performed by: NURSE ANESTHETIST, CERTIFIED REGISTERED

## 2018-10-30 PROCEDURE — 88305 TISSUE EXAM BY PATHOLOGIST: CPT | Performed by: PATHOLOGY

## 2018-10-30 RX ORDER — SODIUM CHLORIDE 0.9 % (FLUSH) 0.9 %
3 SYRINGE (ML) INJECTION
Status: DISCONTINUED | OUTPATIENT
Start: 2018-10-30 | End: 2018-10-30 | Stop reason: HOSPADM

## 2018-10-30 RX ORDER — TRAMADOL HYDROCHLORIDE 50 MG/1
50 TABLET ORAL EVERY 6 HOURS
COMMUNITY
End: 2022-03-08 | Stop reason: SDUPTHER

## 2018-10-30 RX ORDER — SODIUM CHLORIDE 9 MG/ML
INJECTION, SOLUTION INTRAVENOUS CONTINUOUS
Status: DISCONTINUED | OUTPATIENT
Start: 2018-10-30 | End: 2018-10-30 | Stop reason: HOSPADM

## 2018-10-30 RX ORDER — MIDAZOLAM HYDROCHLORIDE 1 MG/ML
INJECTION INTRAMUSCULAR; INTRAVENOUS
Status: COMPLETED
Start: 2018-10-30 | End: 2018-10-30

## 2018-10-30 RX ORDER — OXYCODONE AND ACETAMINOPHEN 10; 325 MG/1; MG/1
1 TABLET ORAL EVERY 4 HOURS PRN
Status: DISCONTINUED | OUTPATIENT
Start: 2018-10-30 | End: 2018-10-30 | Stop reason: HOSPADM

## 2018-10-30 RX ORDER — CEFAZOLIN SODIUM 1 G/3ML
2 INJECTION, POWDER, FOR SOLUTION INTRAMUSCULAR; INTRAVENOUS
Status: COMPLETED | OUTPATIENT
Start: 2018-10-30 | End: 2018-10-30

## 2018-10-30 RX ORDER — BUPIVACAINE HYDROCHLORIDE 7.5 MG/ML
INJECTION, SOLUTION EPIDURAL; RETROBULBAR
Status: DISCONTINUED | OUTPATIENT
Start: 2018-10-30 | End: 2018-10-30

## 2018-10-30 RX ORDER — FENTANYL CITRATE 50 UG/ML
25 INJECTION, SOLUTION INTRAMUSCULAR; INTRAVENOUS EVERY 5 MIN PRN
Status: DISCONTINUED | OUTPATIENT
Start: 2018-10-30 | End: 2018-10-30 | Stop reason: HOSPADM

## 2018-10-30 RX ORDER — PROPOFOL 10 MG/ML
VIAL (ML) INTRAVENOUS
Status: DISCONTINUED | OUTPATIENT
Start: 2018-10-30 | End: 2018-10-30

## 2018-10-30 RX ORDER — LIDOCAINE HYDROCHLORIDE 10 MG/ML
1 INJECTION, SOLUTION EPIDURAL; INFILTRATION; INTRACAUDAL; PERINEURAL ONCE
Status: COMPLETED | OUTPATIENT
Start: 2018-10-30 | End: 2018-10-30

## 2018-10-30 RX ORDER — PROPOFOL 10 MG/ML
VIAL (ML) INTRAVENOUS CONTINUOUS PRN
Status: DISCONTINUED | OUTPATIENT
Start: 2018-10-30 | End: 2018-10-30

## 2018-10-30 RX ORDER — LIDOCAINE HYDROCHLORIDE 10 MG/ML
INJECTION INFILTRATION; PERINEURAL
Status: DISCONTINUED
Start: 2018-10-30 | End: 2018-10-30 | Stop reason: HOSPADM

## 2018-10-30 RX ORDER — FENTANYL CITRATE 50 UG/ML
INJECTION, SOLUTION INTRAMUSCULAR; INTRAVENOUS
Status: DISCONTINUED | OUTPATIENT
Start: 2018-10-30 | End: 2018-10-30

## 2018-10-30 RX ORDER — LIDOCAINE HCL/PF 100 MG/5ML
SYRINGE (ML) INTRAVENOUS
Status: DISCONTINUED | OUTPATIENT
Start: 2018-10-30 | End: 2018-10-30

## 2018-10-30 RX ORDER — ONDANSETRON 2 MG/ML
INJECTION INTRAMUSCULAR; INTRAVENOUS
Status: DISCONTINUED | OUTPATIENT
Start: 2018-10-30 | End: 2018-10-30

## 2018-10-30 RX ORDER — MIDAZOLAM HYDROCHLORIDE 1 MG/ML
INJECTION, SOLUTION INTRAMUSCULAR; INTRAVENOUS
Status: DISCONTINUED | OUTPATIENT
Start: 2018-10-30 | End: 2018-10-30

## 2018-10-30 RX ORDER — LIDOCAINE HYDROCHLORIDE 10 MG/ML
INJECTION, SOLUTION EPIDURAL; INFILTRATION; INTRACAUDAL; PERINEURAL
Status: COMPLETED
Start: 2018-10-30 | End: 2018-10-30

## 2018-10-30 RX ORDER — DEXAMETHASONE SODIUM PHOSPHATE 4 MG/ML
INJECTION, SOLUTION INTRA-ARTICULAR; INTRALESIONAL; INTRAMUSCULAR; INTRAVENOUS; SOFT TISSUE
Status: DISCONTINUED | OUTPATIENT
Start: 2018-10-30 | End: 2018-10-30

## 2018-10-30 RX ADMIN — FENTANYL CITRATE 25 MCG: 50 INJECTION, SOLUTION INTRAMUSCULAR; INTRAVENOUS at 07:10

## 2018-10-30 RX ADMIN — SODIUM CHLORIDE 1000 ML: 0.9 INJECTION, SOLUTION INTRAVENOUS at 06:10

## 2018-10-30 RX ADMIN — PROPOFOL 200 MCG/KG/MIN: 10 INJECTION, EMULSION INTRAVENOUS at 07:10

## 2018-10-30 RX ADMIN — LIDOCAINE HYDROCHLORIDE 0.2 MG: 10 INJECTION, SOLUTION EPIDURAL; INFILTRATION; INTRACAUDAL; PERINEURAL at 06:10

## 2018-10-30 RX ADMIN — DEXAMETHASONE SODIUM PHOSPHATE 8 MG: 4 INJECTION, SOLUTION INTRAMUSCULAR; INTRAVENOUS at 07:10

## 2018-10-30 RX ADMIN — MEPIVACAINE HYDROCHLORIDE 9 ML: 15 INJECTION, SOLUTION EPIDURAL; INFILTRATION at 09:10

## 2018-10-30 RX ADMIN — PROPOFOL 30 MG: 10 INJECTION, EMULSION INTRAVENOUS at 07:10

## 2018-10-30 RX ADMIN — LIDOCAINE HYDROCHLORIDE 50 MG: 20 INJECTION, SOLUTION INTRAVENOUS at 07:10

## 2018-10-30 RX ADMIN — OXYCODONE HYDROCHLORIDE AND ACETAMINOPHEN 1 TABLET: 10; 325 TABLET ORAL at 08:10

## 2018-10-30 RX ADMIN — CEFAZOLIN 2 G: 330 INJECTION, POWDER, FOR SOLUTION INTRAMUSCULAR; INTRAVENOUS at 07:10

## 2018-10-30 RX ADMIN — ONDANSETRON 4 MG: 2 INJECTION INTRAMUSCULAR; INTRAVENOUS at 07:10

## 2018-10-30 RX ADMIN — BUPIVACAINE HYDROCHLORIDE 21 ML: 7.5 INJECTION, SOLUTION EPIDURAL; RETROBULBAR at 09:10

## 2018-10-30 RX ADMIN — SODIUM CHLORIDE, SODIUM GLUCONATE, SODIUM ACETATE, POTASSIUM CHLORIDE, MAGNESIUM CHLORIDE, SODIUM PHOSPHATE, DIBASIC, AND POTASSIUM PHOSPHATE: .53; .5; .37; .037; .03; .012; .00082 INJECTION, SOLUTION INTRAVENOUS at 07:10

## 2018-10-30 RX ADMIN — MIDAZOLAM HYDROCHLORIDE 2 MG: 1 INJECTION, SOLUTION INTRAMUSCULAR; INTRAVENOUS at 07:10

## 2018-10-30 RX ADMIN — PROPOFOL 70 MG: 10 INJECTION, EMULSION INTRAVENOUS at 07:10

## 2018-10-30 RX ADMIN — FENTANYL CITRATE 50 MCG: 50 INJECTION, SOLUTION INTRAMUSCULAR; INTRAVENOUS at 08:10

## 2018-10-30 NOTE — OP NOTE
DATE OF PROCEDURE:  10/30/2018.    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    PROCEDURE PERFORMED:  Excision, mass, right foot, 1 x 1 cm, probable plantar   fibroma.    ANESTHESIA:  Ankle block.    SURGEON:  Ha Caicedo M.D.    ASSISTANT:  Dr. Watson.    COMPLICATIONS:  There were no operative or anesthetic complications.    ESTIMATED BLOOD LOSS:  Zero.    SPECIMEN:  The mass was sent to Pathology for evaluation.    PROCEDURE IN DETAIL:  After anesthesia was administered, the patient's right leg   was prepped and draped in a sterile fashion.  The right foot was exsanguinated   with an Esmarch bandage and this was left around the ankle as a tourniquet.  The   lesion in question was on the medial aspect of the plantar fascia.  A skin   incision was made just medial to the mass and the incision was carried through   the skin and subcutaneous tissue.  Small veins were cauterized on the approach.    The incision was carried down to the edge of the plantar fascia.  Under direct   visualization, I excised an ellipse of the plantar fascia including the nodular   lesion that was consistent with a plantar fibroma.  Care was taken to avoid any   underlying neurovascular structures.  This specimen was sent to Pathology for   evaluation.  The mass did indeed lie over neurovascular structures that were   visible once I removed the lesion.  There were noted to be no other obvious   abnormalities in the area.  The wound was well irrigated.  The skin incision was   closed with interrupted 3-0 nylon sutures.  A sterile compressive dressing was   placed.  The patient was returned to the Recovery Room in stable condition.      CIPRIANO/MARCE  dd: 10/30/2018 08:22:45 (CDQUIRINO)  td: 10/30/2018 10:42:12 (GAURAV)  Doc ID   #[id number]  Job ID #[id number]    CC:

## 2018-10-30 NOTE — ANESTHESIA POSTPROCEDURE EVALUATION
"Anesthesia Post Evaluation    Patient: Ninfa Aguilar    Procedure(s) Performed: Procedure(s) (LRB):  EXCISION, MASS, FOOT probable fibroma (Right)    Final Anesthesia Type: general  Patient location during evaluation: PACU  Patient participation: Yes- Able to Participate  Level of consciousness: awake and alert  Post-procedure vital signs: reviewed and stable  Pain management: adequate  Airway patency: patent  PONV status at discharge: No PONV  Anesthetic complications: no      Cardiovascular status: blood pressure returned to baseline  Respiratory status: unassisted  Hydration status: euvolemic  Follow-up not needed.        Visit Vitals  /60   Pulse 76   Temp 36.6 °C (97.9 °F) (Temporal)   Resp 20   Ht 5' 8" (1.727 m)   Wt 108.9 kg (240 lb)   LMP 01/15/2017   SpO2 98%   Breastfeeding? No   BMI 36.49 kg/m²       Pain/Lina Score: Pain Assessment Performed: Yes (10/30/2018  8:38 AM)  Presence of Pain: complains of pain/discomfort (10/30/2018  8:38 AM)  Pain Rating Prior to Med Admin: 4 (10/30/2018  8:57 AM)  Lina Score: 10 (10/30/2018  8:38 AM)        "

## 2018-10-30 NOTE — PLAN OF CARE
Patient c/o pain and denies nausea.  Tolerates clear liquids well.  VSS.  Discharge instructions given with verbal understanding received.  Anesthesia and procedure consents in chart.

## 2018-10-30 NOTE — TRANSFER OF CARE
"Anesthesia Transfer of Care Note    Patient: Ninfa Aguilar    Procedure(s) Performed: Procedure(s) (LRB):  EXCISION, MASS, FOOT probable fibroma (Right)    Patient location: PACU    Anesthesia Type: regional and general    Transport from OR: Transported from OR on 6-10 L/min O2 by face mask with adequate spontaneous ventilation    Post pain: adequate analgesia    Post assessment: no apparent anesthetic complications and tolerated procedure well    Post vital signs: stable    Level of consciousness: alert and awake    Nausea/Vomiting: no nausea/vomiting    Complications: none    Transfer of care protocol was followed      Last vitals:   Visit Vitals  /65 (BP Location: Left arm, Patient Position: Lying)   Pulse 67   Temp 36.7 °C (98.1 °F) (Oral)   Resp 16   Ht 5' 8" (1.727 m)   Wt 108.9 kg (240 lb)   LMP 01/15/2017   SpO2 96%   Breastfeeding? No   BMI 36.49 kg/m²     "

## 2018-10-30 NOTE — DISCHARGE INSTRUCTIONS
May remove bandage in 48 hours  Replace dressing daily and keep clean and dry  May walk as tolerated with weight on heel only  Keep surgical boot on until clinic visit.  Incision Care  Remember: Follow-up visits allow your healthcare provider to make sure your incision is healing well. Be sure to keep your appointments.     Stitches (sutures), surgical staples, special strips of surgical tape, or surgical skin glue may be used to close incisions. They also help stop bleeding and speed healing. To help your incision heal, follow the tips on this handout.  Home care  Tips for home care include the following:  · Always wash your hands before touching your incision.  · Keep your incision clean and dry.  · Avoid doing things that could cause dirt or sweat to get on your incision.  · Dont pick at scabs. They help protect the wound.  · Keep your incision out of water.  · Take a sponge bath to avoid getting your incision wet, unless your healthcare provider tells you otherwise.  · Ask your provider when can you take a shower or bathe.  · Ask your provider about the best way to keep your incision dry when bathing or showering.  · Pat stitches dry if they get wet. Dont rub.  · Leave the bandage (dressing) in place until you are told to remove it or change it. Change it only as directed, using clean hands.  · Change your dressing if it gets wet or soiled.  ·   Care for specific closures  Follow these guidelines unless your healthcare provider tells you otherwise:  · Stitches or staples. Once you no longer need to keep these dry, clean the wound daily. First remove the bandage using clean hands. Then wash the area gently with soap and warm water. Use a wet cotton swab to loosen and remove any blood or crust that forms. After cleaning, put a thin layer of antibiotic ointment on. Then put on a new bandage.  · Skin glue. Dont put liquid, ointment, or cream on your wound while the glue is in place. Avoid activities that cause  heavy sweating. Protect the wound from sunlight. Do not scratch, rub, or pick at the glue. Do not put tape directly over the glue. The glue should peel off within 5 to 10 days.  · Surgical tape. Keep the area dry. If it gets wet, blot the area dry with a clean towel. Surgical tape usually falls off within 7 to 10 days. If it has not fallen off after 10 days, contact your healthcare provider before taking it off yourself. If you are told to remove the tape, put mineral oil or petroleum jelly on a cotton ball. Gently rub the tape until it is removed.  ·   Changing your dressing  Leave the dressing (bandage) in place until you are told to remove it or change it. Follow the instructions below unless told otherwise by your healthcare provider:  · Always wash your hands before changing your dressing.  · After the first 48 hours, the incision wound usually will have closed. At this point, leave the incision uncovered and open to the air. If the incision has not closed keep it covered.  · Cover your incision only if your clothing is rubbing it or causing irritation.  · Change your dressing if it gets wet or soiled.  ·   Follow-up care  Follow up with your healthcare provider to ask how long sutures or staples should be left in place. Be sure to return for stitch or staple removal as directed. If dissolving stitches were used in your mouth, these will not need to be removed. They should fall out or dissolve on their own.  If tape closures were used, remove them yourself when your provider recommends if they have not fallen off on their own. If skin glue was used, the glue will wear off by itself.  When to seek medical care  Call your healthcare provider if you have any of the following:  · More pain, redness, swelling, bleeding, or foul-smelling discharge around the incision area  · Fever of 100.4°F (38ºC) or higher, or as directed by your healthcare provider  · Shaking chills  · Vomiting or nausea that doesn't go  away  · Numbness, coldness, or tingling around the incision area, or changes in skin color  · Opening of the sutures or wound  · Stitches or staples come apart or fall out or surgical tape falls off before 7 days or as directed by your healthcare provider            1.83

## 2018-10-30 NOTE — INTERVAL H&P NOTE
The patient has been examined and the H&P has been reviewed:    I concur with the findings and no changes have occurred since H&P was written.    Anesthesia/Surgery risks, benefits and alternative options discussed and understood by patient/family.          Active Hospital Problems    Diagnosis  POA    Mass of right foot [R22.41]  Yes      Resolved Hospital Problems   No resolved problems to display.

## 2018-10-30 NOTE — ANESTHESIA PREPROCEDURE EVALUATION
10/30/2018  Ninfa Aguilar is a 42 y.o., female.    Pre-operative evaluation for Procedure(s) (LRB):  EXCISION, MASS, FOOT probable fibroma (Right)    Ninfa Aguilar is a 42 y.o. female     Patient Active Problem List   Diagnosis    Multinodular goiter (nontoxic)    Hypothyroidism    Vitamin D deficiency disease    Coccyx pain    Body mass index (bmi) 36.0-36.9, adult    Patient is Jew    Acute internal derangement of right knee    Right knee pain    Primary osteoarthritis of right knee    Mass of right foot       Review of patient's allergies indicates:   Allergen Reactions    Iodinated contrast- oral and iv dye     Iodine and iodide containing products Rash     Eats shellfish.  Eats shellfish.    Meloxicam Hives     Can take ibuprofen       No current facility-administered medications on file prior to encounter.      Current Outpatient Medications on File Prior to Encounter   Medication Sig Dispense Refill    ALPRAZolam (XANAX) 1 MG tablet TK 1 T PO  TID  5    carisoprodol (SOMA) 350 MG tablet Soma 350 mg tablet   Take 1 tablet every 6 hours by oral route as needed for 30 days.      cholecalciferol, vitamin D3, (VITAMIN D3) 1,000 unit capsule Take 5,000 Units by mouth.      estradiol (ESTRACE) 2 MG tablet TAKE 1 TABLET(2 MG) BY MOUTH EVERY DAY 90 tablet 0    levothyroxine (SYNTHROID) 112 MCG tablet Take 1 tablet (112 mcg total) by mouth once daily. 90 tablet 2    traMADol (ULTRAM) 50 mg tablet Take 50 mg by mouth every 6 (six) hours.      HYDROcodone-acetaminophen (NORCO) 5-325 mg per tablet Take 1 tablet by mouth every 4 to 6 hours as needed for Pain. 42 tablet 0    vitamin E 1000 UNIT capsule Take 1,000 Units by mouth.         Past Surgical History:   Procedure Laterality Date    APPENDECTOMY      DILATION AND CURETTAGE OF UTERUS      [facility]   2017    [facility]     OVARIAN CYST SURGERY Right     ROBOTIC ASSISTED LAPAROSCOPIC HYSTERECTOMY WITH POSSIBLE STAGING N/A 2017    [facility]    SALPINGECTOMY-ROBOTIC ASSISTED Bilateral 2017    [facility]    SHOULDER SURGERY      right    tubal lig  1998       Social History     Socioeconomic History    Marital status:      Spouse name: Not on file    Number of children: Not on file    Years of education: Not on file    Highest education level: Not on file   Social Needs    Financial resource strain: Not on file    Food insecurity - worry: Not on file    Food insecurity - inability: Not on file    Transportation needs - medical: Not on file    Transportation needs - non-medical: Not on file   Occupational History    Not on file   Tobacco Use    Smoking status: Former Smoker     Packs/day: 0.10     Years: 5.00     Pack years: 0.50     Types: Cigarettes     Last attempt to quit: 1992     Years since quittin.3    Smokeless tobacco: Former User   Substance and Sexual Activity    Alcohol use: Yes     Alcohol/week: 0.6 - 1.2 oz     Types: 1 - 2 Glasses of wine per week     Comment: daily    Drug use: No    Sexual activity: Yes     Partners: Male     Birth control/protection: None     Comment:  to Mane    Other Topics Concern    Not on file   Social History Narrative    Not on file         Anesthesia Evaluation    I have reviewed the Patient Summary Reports.    I have reviewed the Nursing Notes.      Review of Systems  Anesthesia Hx:  Hx of Anesthetic complications PONV History of prior surgery of interest to airway management or planning: Denies Family Hx of Anesthesia complications.    Social:  No Alcohol Use, Non-Smoker    Cardiovascular:   Exercise tolerance: good  Denies Angina.    Musculoskeletal:   Arthritis     Neurological:   Chronic neck pain - takes soma prn   Endocrine:    Hypothyroidism    Psych:   depression          Physical Exam  General:  Obesity    Airway/Jaw/Neck:  Airway Findings: Mouth Opening: Small, but > 3cm Tongue: Normal  Mallampati: III  TM Distance: Normal, at least 6 cm      Dental:  Dental Findings:    Chest/Lungs:  Chest/Lungs Findings: Clear to auscultation, Normal Respiratory Rate     Heart/Vascular:  Heart Findings: Rate: Normal  Rhythm: Regular Rhythm        Mental Status:  Mental Status Findings:  Cooperative, Alert and Oriented         Anesthesia Plan  Type of Anesthesia, risks & benefits discussed:  Anesthesia Type:  general, regional  Patient's Preference:   Intra-op Monitoring Plan: standard ASA monitors  Intra-op Monitoring Plan Comments:   Post Op Pain Control Plan: IV/PO Opioids PRN  Post Op Pain Control Plan Comments:   Induction:   IV  Beta Blocker:  Patient is not currently on a Beta-Blocker (No further documentation required).       Informed Consent: Patient understands risks and agrees with Anesthesia plan.  Questions answered. Anesthesia consent signed with patient.  ASA Score: 2     Day of Surgery Review of History & Physical:    H&P update referred to the surgeon.         Ready For Surgery From Anesthesia Perspective.

## 2018-10-30 NOTE — ANESTHESIA PROCEDURE NOTES
Ankle Block    Patient location during procedure: OR    Reason for block: primary anesthetic   Diagnosis: right foot pain   Start time: 10/30/2018 7:11 AM  Timeout: 10/30/2018 7:10 AM   End time: 10/30/2018 7:15 AM  Staffing  Anesthesiologist: Dorothy Valencia MD  Resident/CRNA: Cheryl Rosa MD  Performed: resident/CRNA   Preanesthetic Checklist  Completed: patient identified, site marked, surgical consent, pre-op evaluation, timeout performed, IV checked, risks and benefits discussed and monitors and equipment checked  Peripheral Block  Patient position: supine  Prep: ChloraPrep  Patient monitoring: heart rate, cardiac monitor, continuous pulse ox, continuous capnometry and frequent blood pressure checks  Block type: ankle - saphenous, ankle - superficial peroneal, ankle - sural and ankle - tibial (Ankle - deep peroneal)  Laterality: right  Injection technique: single shot  Needle  Needle type: Stimuplex   Needle gauge: 22 G  Needle length: 2 in  Needle localization: anatomical landmarks     Assessment  Injection assessment: negative aspiration and negative parasthesia  Paresthesia pain: none  Heart rate change: no  Slow fractionated injection: yes  Additional Notes  See anesthesia record for vitals.  Block performed under sedation.  Patient tolerated well.

## 2018-10-30 NOTE — BRIEF OP NOTE
[facility]  Brief Operative Note     SUMMARY     Surgery Date: 10/30/2018     Surgeon(s) and Role:     * Ha Caicedo MD - Primary    Assisting Surgeon: None    Pre-op Diagnosis:  Mass of right foot [R22.41]    Post-op Diagnosis:  Post-Op Diagnosis Codes:     * Mass of right foot [R22.41]    Procedure(s) (LRB):  EXCISION, MASS, FOOT probable fibroma (Right)    Anesthesia: Choice    Description of the findings of the procedure: fibrola right foot    Findings/Key Components: as above    Estimated Blood Loss: * No values recorded between 10/30/2018  7:33 AM and 10/30/2018  8:03 AM *         Specimens:   Specimen (12h ago, onward)    Start     Ordered    10/30/18 0758  Specimen to Pathology - Surgery  Once     Comments:  1. Right foot mass-permanent     Start Status   10/30/18 0758 Collected (10/30/18 0758)       10/30/18 0758          Discharge Note    SUMMARY     Admit Date: 10/30/2018    Discharge Date and Time: No discharge date for patient encounter.    Hospital Course (synopsis of major diagnoses, care, treatment, and services provided during the course of the hospital stay): Hospital Course:  On 10/30/2018, the patient arrived to [facility] for proper pre-operative management.  Upon completion of pre-operative preparation, the patient was taken back to the operative theatre.  A mass excision was performed without complication and the patient was transported to the post anesthesia care unit in stable condition. The patient suffered minimal blood loss and electrolyte imbalances during the procedure, which were correct accordingly if neccessary. After appropriate recovery from the anaesthetic agents used during the surgery the patient was discharged home.       Final Diagnosis: Post-Op Diagnosis Codes:     * Mass of right foot [R22.41]    Disposition: Home or Self Care    Follow Up/Patient Instructions:     Medications:  Reconciled Home Medications:      Medication List       CONTINUE taking these medications    ALPRAZolam 1 MG tablet  Commonly known as:  XANAX  TK 1 T PO  TID     estradiol 2 MG tablet  Commonly known as:  ESTRACE  TAKE 1 TABLET(2 MG) BY MOUTH EVERY DAY     HYDROcodone-acetaminophen 5-325 mg per tablet  Commonly known as:  NORCO  Take 1 tablet by mouth every 4 to 6 hours as needed for Pain.     levothyroxine 112 MCG tablet  Commonly known as:  SYNTHROID  Take 1 tablet (112 mcg total) by mouth once daily.     SOMA 350 MG tablet  Generic drug:  carisoprodol  Soma 350 mg tablet   Take 1 tablet every 6 hours by oral route as needed for 30 days.     traMADol 50 mg tablet  Commonly known as:  ULTRAM  Take 50 mg by mouth every 6 (six) hours.     VITAMIN D3 1,000 unit capsule  Generic drug:  cholecalciferol (vitamin D3)  Take 5,000 Units by mouth.     vitamin E 1000 UNIT capsule  Take 1,000 Units by mouth.          Discharge Procedure Orders   Call MD for:  temperature >100.4     Call MD for:  persistent nausea and vomiting or diarrhea     Call MD for:  severe uncontrolled pain     Call MD for:  redness, tenderness, or signs of infection (pain, swelling, redness, odor or green/yellow discharge around incision site)     Call MD for:  difficulty breathing or increased cough     Call MD for:  severe persistent headache     Call MD for:  worsening rash     Call MD for:  persistent dizziness, light-headedness, or visual disturbances     Call MD for:  increased confusion or weakness     Follow-up Information     Ha Caicedo MD In 2 weeks.    Specialty:  Orthopedic Surgery  Contact information:  [address] [facility] [address]  [phone]

## 2018-10-31 VITALS
HEART RATE: 58 BPM | BODY MASS INDEX: 36.37 KG/M2 | OXYGEN SATURATION: 100 % | HEIGHT: 68 IN | SYSTOLIC BLOOD PRESSURE: 160 MMHG | TEMPERATURE: 98 F | WEIGHT: 240 LBS | RESPIRATION RATE: 22 BRPM | DIASTOLIC BLOOD PRESSURE: 74 MMHG

## 2018-11-01 ENCOUNTER — NURSE TRIAGE (OUTPATIENT)
Dept: ADMINISTRATIVE | Facility: CLINIC | Age: 43
End: 2018-11-01

## 2018-11-01 NOTE — TELEPHONE ENCOUNTER
Reason for Disposition   [1] Caller requesting NON-URGENT health information AND [2] PCP's office is the best resource    Protocols used: ST INFORMATION ONLY CALL-A-AH    Pt would like to know what she should do when she removes surgical dressing. Does not specify in d/c instructions. Please call pt to advise.

## 2018-11-01 NOTE — TELEPHONE ENCOUNTER
Spoke with pt.  Advised to leave surgical dressing in place.   If pt does decide to remove it after today, she may replace it with some gauze and an ace bandage  Pt verbalized understanding

## 2018-11-12 ENCOUNTER — OFFICE VISIT (OUTPATIENT)
Dept: ORTHOPEDICS | Facility: CLINIC | Age: 43
End: 2018-11-12
Payer: COMMERCIAL

## 2018-11-12 VITALS
TEMPERATURE: 97 F | HEART RATE: 75 BPM | RESPIRATION RATE: 16 BRPM | WEIGHT: 240.06 LBS | SYSTOLIC BLOOD PRESSURE: 117 MMHG | BODY MASS INDEX: 36.38 KG/M2 | DIASTOLIC BLOOD PRESSURE: 78 MMHG | HEIGHT: 68 IN

## 2018-11-12 DIAGNOSIS — Z09 S/P ORTHOPEDIC SURGERY, FOLLOW-UP EXAM: Primary | ICD-10-CM

## 2018-11-12 DIAGNOSIS — R22.41 MASS OF RIGHT FOOT: ICD-10-CM

## 2018-11-12 PROCEDURE — 99999 PR PBB SHADOW E&M-EST. PATIENT-LVL III: CPT | Mod: PBBFAC,,, | Performed by: PHYSICIAN ASSISTANT

## 2018-11-12 PROCEDURE — 99024 POSTOP FOLLOW-UP VISIT: CPT | Mod: S$GLB,,, | Performed by: PHYSICIAN ASSISTANT

## 2018-11-12 RX ORDER — HYDROCODONE BITARTRATE AND ACETAMINOPHEN 5; 325 MG/1; MG/1
1 TABLET ORAL
Qty: 42 TABLET | Refills: 0 | Status: SHIPPED | OUTPATIENT
Start: 2018-11-12 | End: 2020-02-05

## 2018-11-12 NOTE — PROGRESS NOTES
Ms. Aguilar is here today for a post-operative visit after a    Excision, mass, right foot, 1 x 1 cm, probable plantar fibroma.  by Dr. Caicedo on 10/30/2018.    Pathology: Dense fibrous connective tissue    Interval History:    she reports that she is doing ok.  Pain is controlled.  she is  taking pain medication.  Refill needed, Reports decreased sensation to great and second toes.    she denies fever, chills, and sweats since the time of the surgery.     Physical exam:  Dressing taken down.  Incision is clean, dry and intact.  Sutures removed without difficulty.      RADS: none done today    Assessment:  Post-op visit ( 2 weeks)    Plan:  Current care, treatment plan, precautions, activity level/ modifications, limitations, rehabilitation exercises and proposed future treatment were discussed with the patient. We discussed the need to monitor for changes in symptoms and condition and report them to the physician.  Discussed importance of compliance with all appointments and follow up examinations.   - The patient was advised to keep the incision clean and dry for the next 24 hours after which she may wash the area with antibacterial soap in the shower. Will not submerge until the incision is completely healed  -Patient was advised to monitor wound closely and multiple times daily for any problems. Call clinic immediately or report to ED for immediate medical attention for any complications including reopening of wound, drainage, purulence, redness, streaking, odor, pain out of proportion, fever, chills, etc.   - weight bearing as tolerated, range of motion as tolerated  - advance shoe wear as tolerated.   - pain medication:  refill needed,    Pain medication refill policy provided to patient for review.   - Patient is to return to clinic in 4 weeks with        If there are any questions prior to scheduled follow up, the patient was instructed to contact the office

## 2018-11-14 ENCOUNTER — PATIENT MESSAGE (OUTPATIENT)
Dept: ORTHOPEDICS | Facility: CLINIC | Age: 43
End: 2018-11-14

## 2018-12-10 ENCOUNTER — OFFICE VISIT (OUTPATIENT)
Dept: ORTHOPEDICS | Facility: CLINIC | Age: 43
End: 2018-12-10
Payer: COMMERCIAL

## 2018-12-10 DIAGNOSIS — R22.41 MASS OF RIGHT FOOT: ICD-10-CM

## 2018-12-10 DIAGNOSIS — Z09 S/P ORTHOPEDIC SURGERY, FOLLOW-UP EXAM: Primary | ICD-10-CM

## 2018-12-10 PROCEDURE — 99999 PR PBB SHADOW E&M-EST. PATIENT-LVL I: CPT | Mod: PBBFAC,,, | Performed by: ORTHOPAEDIC SURGERY

## 2018-12-10 PROCEDURE — 99024 POSTOP FOLLOW-UP VISIT: CPT | Mod: S$GLB,,, | Performed by: ORTHOPAEDIC SURGERY

## 2018-12-12 NOTE — PROGRESS NOTES
Ninfa Aguilar  Returns today for postop follow-up.  She is now 6 weeks postop from excision of a mass from her right foot.  Pathology was consistent with a plantar fibroma.  She reports some continued discomfort but overall is improving.    Examination:  Her incision is well healed.  There is still some swelling and mild tenderness.    Impression:  6 weeks status post excision plantar fibroma right foot, progressing as expected    Recommendation:  She can progress her activity and shoe wear as pain and swelling allow.    Follow-up in 6 weeks if necessary

## 2019-01-14 ENCOUNTER — TELEPHONE (OUTPATIENT)
Dept: ORTHOPEDICS | Facility: CLINIC | Age: 44
End: 2019-01-14

## 2019-01-14 NOTE — TELEPHONE ENCOUNTER
----- Message from Yaima Blake sent at 1/14/2019  2:14 PM CST -----  Contact: self  Pt is calling in regards to rescheduling her appt that was canceled for 01/21. The first available appt is 02/25. Pt would like a callback for a sooner appt.    She can be reached at [phone].     Thank you

## 2019-01-24 ENCOUNTER — PATIENT MESSAGE (OUTPATIENT)
Dept: OBSTETRICS AND GYNECOLOGY | Facility: CLINIC | Age: 44
End: 2019-01-24

## 2019-01-28 ENCOUNTER — OFFICE VISIT (OUTPATIENT)
Dept: ORTHOPEDICS | Facility: CLINIC | Age: 44
End: 2019-01-28
Payer: COMMERCIAL

## 2019-01-28 DIAGNOSIS — Z09 S/P ORTHOPEDIC SURGERY, FOLLOW-UP EXAM: Primary | ICD-10-CM

## 2019-01-28 DIAGNOSIS — R22.41 MASS OF RIGHT FOOT: ICD-10-CM

## 2019-01-28 PROCEDURE — 99024 PR POST-OP FOLLOW-UP VISIT: ICD-10-PCS | Mod: S$GLB,,, | Performed by: ORTHOPAEDIC SURGERY

## 2019-01-28 PROCEDURE — 99999 PR PBB SHADOW E&M-EST. PATIENT-LVL I: ICD-10-PCS | Mod: PBBFAC,,, | Performed by: ORTHOPAEDIC SURGERY

## 2019-01-28 PROCEDURE — 99024 POSTOP FOLLOW-UP VISIT: CPT | Mod: S$GLB,,, | Performed by: ORTHOPAEDIC SURGERY

## 2019-01-28 PROCEDURE — 99999 PR PBB SHADOW E&M-EST. PATIENT-LVL I: CPT | Mod: PBBFAC,,, | Performed by: ORTHOPAEDIC SURGERY

## 2019-01-30 NOTE — PROGRESS NOTES
Ms. Aguilar returns today.  She is now about three months postop from excision   of a plantar fibroma from the arch of her right foot.  She reports continued   improvement.  Her preoperative symptoms on the bottom of her foot are improved.    She still complains of some thickening of the scar tissue on the bottom of her   foot.  She also reports some continued numbness on the dorsum of her foot,   presumably from the ankle block.  Examination reveals that the incision is well   healed.  There is still some thickening of the scar, but no significant   tenderness.  She has active motion of her toes.  She does have some sensation on   the dorsum of her foot.    IMPRESSION:  Three months postop excision fibroma, left foot, doing well.    RECOMMENDATION:  I would let her continue to progress her weightbearing and she   wear as pain and swelling allow.  I believe the numbness in her foot should   continue to resolve with time.  I am going to have her return to see me on an   as-needed basis.      CIPRIANO/IN  dd: 01/29/2019 08:58:31 (CST)  td: 01/30/2019 03:26:20 (CST)  Doc ID   #[id number]  Job ID #[id number]    CC:

## 2019-04-29 NOTE — PROGRESS NOTES
Left message for patient to return call to [phone].    Mammogram and US ordered.   Ninfa is a 42 y.o. female here today for a pre-operative visit in preparation for a   EXCISION, MASS, FOOT probable fibroma- right   to be performed by  on 10/30/2018.  she was last seen and treated in the clinic on 08/16/2018. There has been no significant change in their past medical or orthopedic status since the previous visit.  No fever, chills, malaise or unexplained weight change.     Allergies, medications, past medical history and past surgical history were reviewed with the patient.     Physical examination was performed.     Consents were signed.   Patient has crutches and will bring with them the day of surgery. They have been counseled on proper use of the assistive device.     Pre, diogo, and post operative procedure and expectations were discussed.  Questions were answered. The patient has been educated and is ready to proceed with surgery.  Approximately 30 minutes was spent discussing surgical outcomes, plans, procedures, pre, diogo, and post operative expectations and care. The risks, benefits and alternatives to surgery were discussed with the patient at great length.  These include bleeding, infection, vessel/nerve damage, pain, numbness, tingling, complex regional pain syndrome, hardware/surgical failure, need for further surgery, malunion, nonunion, DVT, PE, arthritis and death. He also understands that the risks of surgery may be greater for some patients due to health or lifestyle issues, such as a current condition or a history of heart disease, obesity, clotting disorders, recurrent infections, smoking, sedentary lifestyle, or noncompliance with medications, therapy, or followup. The degree of the increased risk is hard to estimate with any degree of precision.  Patient states an understanding and wishes to proceed with surgery.   All questions were answered.  No guarantees were implied or stated.  Informed consent was obtained  The patient will contact us if the have any  questions, concerns, and changes in their medical condition prior to surgery.

## 2019-05-14 ENCOUNTER — HOSPITAL ENCOUNTER (OUTPATIENT)
Dept: RADIOLOGY | Facility: HOSPITAL | Age: 44
Discharge: HOME | End: 2019-05-14
Attending: OBSTETRICS & GYNECOLOGY
Payer: COMMERCIAL

## 2019-05-14 VITALS — BODY MASS INDEX: 36.49 KG/M2 | WEIGHT: 240 LBS

## 2019-05-14 DIAGNOSIS — Z12.31 VISIT FOR SCREENING MAMMOGRAM: ICD-10-CM

## 2019-05-14 PROCEDURE — 77067 SCR MAMMO BI INCL CAD: CPT | Mod: TC

## 2019-05-14 PROCEDURE — 77063 BREAST TOMOSYNTHESIS BI: CPT | Mod: 26,,, | Performed by: RADIOLOGY

## 2019-05-14 PROCEDURE — 77063 MAMMO DIGITAL SCREENING BILAT WITH TOMOSYNTHESIS_CAD: ICD-10-PCS | Mod: 26,,, | Performed by: RADIOLOGY

## 2019-05-14 PROCEDURE — 77067 MAMMO DIGITAL SCREENING BILAT WITH TOMOSYNTHESIS_CAD: ICD-10-PCS | Mod: 26,,, | Performed by: RADIOLOGY

## 2019-05-14 PROCEDURE — 77067 SCR MAMMO BI INCL CAD: CPT | Mod: 26,,, | Performed by: RADIOLOGY

## 2019-05-16 ENCOUNTER — OFFICE VISIT (OUTPATIENT)
Dept: OBSTETRICS AND GYNECOLOGY | Facility: CLINIC | Age: 44
End: 2019-05-16
Payer: COMMERCIAL

## 2019-05-16 VITALS
WEIGHT: 245.13 LBS | SYSTOLIC BLOOD PRESSURE: 100 MMHG | DIASTOLIC BLOOD PRESSURE: 60 MMHG | BODY MASS INDEX: 37.15 KG/M2 | HEIGHT: 68 IN

## 2019-05-16 DIAGNOSIS — N95.1 MENOPAUSAL SYMPTOMS: ICD-10-CM

## 2019-05-16 DIAGNOSIS — Z01.419 ENCOUNTER FOR GYNECOLOGICAL EXAMINATION WITHOUT ABNORMAL FINDING: Primary | ICD-10-CM

## 2019-05-16 DIAGNOSIS — Z12.31 SCREENING MAMMOGRAM, ENCOUNTER FOR: ICD-10-CM

## 2019-05-16 DIAGNOSIS — R35.0 URINARY FREQUENCY: ICD-10-CM

## 2019-05-16 LAB
BILIRUB SERPL-MCNC: NEGATIVE MG/DL
BLOOD URINE, POC: NEGATIVE
COLOR, POC UA: YELLOW
GLUCOSE UR QL STRIP: NORMAL
KETONES UR QL STRIP: NEGATIVE
LEUKOCYTE ESTERASE URINE, POC: ABNORMAL
NITRITE, POC UA: NEGATIVE
PH, POC UA: 5
PROTEIN, POC: NEGATIVE
SPECIFIC GRAVITY, POC UA: 1.01
UROBILINOGEN, POC UA: NORMAL

## 2019-05-16 PROCEDURE — 81002 URINALYSIS NONAUTO W/O SCOPE: CPT | Mod: S$GLB,,, | Performed by: OBSTETRICS & GYNECOLOGY

## 2019-05-16 PROCEDURE — 99999 PR PBB SHADOW E&M-EST. PATIENT-LVL III: CPT | Mod: PBBFAC,,, | Performed by: OBSTETRICS & GYNECOLOGY

## 2019-05-16 PROCEDURE — 99396 PREV VISIT EST AGE 40-64: CPT | Mod: 25,S$GLB,, | Performed by: OBSTETRICS & GYNECOLOGY

## 2019-05-16 PROCEDURE — 81002 POCT URINE DIPSTICK WITHOUT MICROSCOPE: ICD-10-PCS | Mod: S$GLB,,, | Performed by: OBSTETRICS & GYNECOLOGY

## 2019-05-16 PROCEDURE — 99999 PR PBB SHADOW E&M-EST. PATIENT-LVL III: ICD-10-PCS | Mod: PBBFAC,,, | Performed by: OBSTETRICS & GYNECOLOGY

## 2019-05-16 PROCEDURE — 87086 URINE CULTURE/COLONY COUNT: CPT

## 2019-05-16 PROCEDURE — 99396 PR PREVENTIVE VISIT,EST,40-64: ICD-10-PCS | Mod: 25,S$GLB,, | Performed by: OBSTETRICS & GYNECOLOGY

## 2019-05-16 RX ORDER — SODIUM, POTASSIUM,MAG SULFATES 17.5-3.13G
1 SOLUTION, RECONSTITUTED, ORAL ORAL ONCE
Refills: 0 | COMMUNITY
Start: 2019-02-26 | End: 2021-07-21

## 2019-05-16 RX ORDER — ESTRADIOL 2 MG/1
TABLET ORAL
Qty: 90 TABLET | Refills: 3 | Status: SHIPPED | OUTPATIENT
Start: 2019-05-16 | End: 2020-09-28 | Stop reason: SDUPTHER

## 2019-05-16 RX ORDER — METHOCARBAMOL 750 MG/1
1 TABLET, FILM COATED ORAL 2 TIMES DAILY PRN
Refills: 2 | COMMUNITY
Start: 2019-05-06 | End: 2021-11-30

## 2019-05-16 NOTE — PROGRESS NOTES
"Well-woman exam    Ninfa Aguilar is a 43 y.o. year old  who presents for annual exam.  She is S/P Davinci hysterectomy.  Patient reports intermittent use of Estrace.  Patient denies menopausal symptoms today.  Patient reports mild urgency and dysuria over the last 2 days.  Patient also reports right-sided pelvic discomfort/hip discomfort that she is seeing physical therapy for.  No other gyn complaints today.    Past Medical History:   Diagnosis Date    Abnormal Pap smear of cervix     Arthritis     Back pain     Cervical disc syndrome     Depression     Essential tremor     General anesthetics causing adverse effect in therapeutic use     patient reports she was told she "woke up" during tubal ligation    Goiter     MNG    Hypothyroidism     Lumbar disc disease     Patient is Hindu     Polycystic ovaries     PONV (postoperative nausea and vomiting)     Vitamin D deficiency        Past Surgical History:   Procedure Laterality Date    APPENDECTOMY      BREAST BIOPSY Left     Excisional removal of lymph node, benign    DILATION AND CURETTAGE OF UTERUS      EXCISION, MASS, FOOT probable fibroma Right 10/30/2018    Performed [facility]    [facility]     OVARIAN CYST SURGERY Right     ROBOTIC ASSISTED LAPAROSCOPIC HYSTERECTOMY WITH POSSIBLE STAGING N/A 2017    Performed by Tr Roberts IV, [facility] at [facility]    SALPINGECTOMY-ROBOTIC ASSISTED Bilateral 2017    Performed by Tr [facility], [facility] at [facility] OR    SHOULDER SURGERY      right    tubal lig  1998       OB History        2    Para   2    Term   2            AB        Living           SAB        TAB        Ectopic        Multiple        Live Births                     ROS:  GENERAL: Feeling well overall.   SKIN: Denies rash or lesions.   HEAD: Denies head injury or headache.   NODES: Denies enlarged lymph nodes.   CHEST: " Denies chest pain or shortness of breath.   CARDIOVASCULAR: Denies palpitations or left sided chest pain.   ABDOMEN: No abdominal pain, nausea, vomiting or rectal bleeding.   URINARY: No dysuria, hematuria, or burning on urination.  Occasional urgency and mild stress incontinence reported.  REPRODUCTIVE: See HPI.   BREASTS: Denies pain, lumps, or nipple discharge.   HEMATOLOGIC: No easy bruisability or excessive bleeding.   MUSCULOSKELETAL: Denies joint pain or swelling.   NEUROLOGIC: Denies syncope or weakness.   PSYCHIATRIC: Denies depression.    PE:   APPEARANCE: Well nourished, well developed, in no acute distress.  NECK: Neck symmetric without masses.  Mild thyromegaly noted  NODES: No inguinal lymph node enlargement.  ABDOMEN: Soft. No tenderness or masses. No hernias.  Laparoscopic scars noted/healed  BREASTS: Symmetrical, no skin changes or visible lesions. No palpable masses, nipple discharge or adenopathy bilaterally.  PELVIC: Normal external female genitalia without lesions. Normal hair distribution. Adequate perineal body, normal urethral meatus. Vagina with rugae and without lesions or discharge. Mild cystocele and no rectocele. Uterus and cervix surgically absent. Bimanual exam revealed no masses, tenderness or abnormality.  No right-sided discomfort noted on bimanual exam.  ANUS: Normal.    Diagnosis:  1. Encounter for gynecological examination without abnormal finding    2. Urinary frequency    3. Menopausal symptoms    4. Screening mammogram, encounter for      PLAN:    Orders Placed This Encounter    Urine culture    Mammo Digital Screening Bilat w/ [facility]    POCT URINE DIPSTICK WITHOUT MICROSCOPE    estradiol (ESTRACE) 2 MG tablet       Patient was counseled today on perimenopausal issues.   Orders as above  Screening mammogram ordered.  Risks, benefits, and alternatives to oral estrogen therapy discussed with the patient in detail.  Patient to continue Estrace 2 mg per day.  Benign by manual  exam today.  If patient continues to experience right sided pelvic pain times 2-3 months, patient is to contact office for pelvic ultrasound.      Follow-up in 1 year.    Tr Roberts IV, MD

## 2019-05-19 LAB — BACTERIA UR CULT: NO GROWTH

## 2019-05-22 ENCOUNTER — TELEPHONE (OUTPATIENT)
Dept: OBSTETRICS AND GYNECOLOGY | Facility: CLINIC | Age: 44
End: 2019-05-22

## 2019-05-22 NOTE — TELEPHONE ENCOUNTER
"Called and left vm for pt to cb. Was going to inform pt of u cx results per Dr. Roberts:   "Negative urine culture at 4 days.    GMIV"        "

## 2019-05-29 ENCOUNTER — PATIENT MESSAGE (OUTPATIENT)
Dept: ORTHOPEDICS | Facility: CLINIC | Age: 44
End: 2019-05-29

## 2019-05-29 ENCOUNTER — TELEPHONE (OUTPATIENT)
Dept: OBSTETRICS AND GYNECOLOGY | Facility: CLINIC | Age: 44
End: 2019-05-29

## 2019-05-29 NOTE — TELEPHONE ENCOUNTER
----- Message from Cate Yoder sent at 5/29/2019 12:04 PM CDT -----  Contact: pt  Name of Who is Calling: VIRGINIA LOZANO [[id number]]      What is the request in detail: pt returning call.. Please advise      Can the clinic reply by [facility]:yes      What [facility] to [facility] Back [facility]: [phone]

## 2019-05-29 NOTE — TELEPHONE ENCOUNTER
"Called and spoke to pt, informed pt of u cx results per Dr. Roberts:   "Negative urine culture at 4 days.     GMIV"    Pt verbalized understanding and no further questions. Pt states that urinary frequency has improved slightly since visit, will update us if gets worse.   "

## 2019-06-14 ENCOUNTER — OFFICE VISIT (OUTPATIENT)
Dept: ORTHOPEDICS | Facility: CLINIC | Age: 44
End: 2019-06-14
Payer: COMMERCIAL

## 2019-06-14 DIAGNOSIS — M79.671 RIGHT FOOT PAIN: Primary | ICD-10-CM

## 2019-06-14 PROCEDURE — 99999 PR PBB SHADOW E&M-EST. PATIENT-LVL II: ICD-10-PCS | Mod: PBBFAC,,, | Performed by: ORTHOPAEDIC SURGERY

## 2019-06-14 PROCEDURE — 99999 PR PBB SHADOW E&M-EST. PATIENT-LVL II: CPT | Mod: PBBFAC,,, | Performed by: ORTHOPAEDIC SURGERY

## 2019-06-14 PROCEDURE — 99213 OFFICE O/P EST LOW 20 MIN: CPT | Mod: S$GLB,,, | Performed by: ORTHOPAEDIC SURGERY

## 2019-06-14 PROCEDURE — 99213 PR OFFICE/OUTPT VISIT, EST, LEVL III, 20-29 MIN: ICD-10-PCS | Mod: S$GLB,,, | Performed by: ORTHOPAEDIC SURGERY

## 2019-06-19 NOTE — PROGRESS NOTES
Ninfa Aguilar   Returns today for follow-up.  It has been over 8 months since I excised a plantar fibroma from the bottom of her right foot. She is not sure if the excision really helped because she still has symptoms and some continued enlargement in the surgical area. She also reports multiple other musculoskeletal complaints and is concerned she may have some type of systemic inflammatory problem.  In addition, some of her symptoms seem to be neurogenic in nature including foot numbness and burning pain.    Examination:  The incision the bottom right foot is well healed.  There is still some thickening and enlargement of the plantar fascia in the region of the previous surgery. She is focally tender in this area but also has somewhat diffuse tenderness. She has good motion of her ankle and subtalar joint. She has good distal pulses. She has protective sensation in her foot.    Impression:  1.  Status post plantar fibroma removal right foot with possible early recurrence                       2.  Neurogenic right foot pain                       3.  Multiple musculoskeletal complaints    Recommendation:  With regards to her foot I would not recommend any further surgical intervention. I explained to her before we removed the fibroma that there is a significant risk of recurrence.  She is concerned about her other musculoskeletal complaints and whether they may all be related.  I would recommend referral to Rheumatology for evaluation.    Follow-up with me as needed

## 2020-01-20 ENCOUNTER — TELEPHONE (OUTPATIENT)
Dept: FAMILY MEDICINE | Facility: CLINIC | Age: 45
End: 2020-01-20

## 2020-01-24 ENCOUNTER — TELEPHONE (OUTPATIENT)
Dept: FAMILY MEDICINE | Facility: CLINIC | Age: 45
End: 2020-01-24

## 2020-01-24 NOTE — TELEPHONE ENCOUNTER
spoke to pt about appt rs. asked if another provider specialized in soft tissue issues. asked me to call back. attempted to call back, no answer, lvm for pt to call rs.

## 2020-02-04 PROBLEM — E66.812 CLASS 2 OBESITY: Status: ACTIVE | Noted: 2020-02-04

## 2020-02-04 PROBLEM — E66.9 CLASS 2 OBESITY: Status: ACTIVE | Noted: 2020-02-04

## 2020-02-04 NOTE — PROGRESS NOTES
" Patient ID: Ninfa Aguilar is a 44 y.o. female.    Chief Complaint: Establish Care    HPI  From [place]. Living in [place] now. . Has 2 children. Works from home as .     She is here today to [facility]. She has diffuse joint pain. It is difficult to open jars. Saw Rheum in the past. Has seen neurology in past for essential tremor. Joint pain is worse in am and at night. She takes tylenol and tramadol prn. Has recently joined gym.  --> check RA labs  --> ref rheumatology     RUQ pain with hx of gall bladder sludge.   --> check abd US.     Past medical history includes:  1.  Hypothyroidism:  Levothyroxine  --> States TSH was normal in nov.   2.  Ledderhose dz of bilateral feet.   --> f/u ortho   3.  Religious  4.  class 2 obesity  --> discussed wt loss and exercise.   5.  Essential tremor: prn xanax. Past Tx include primidone and propranolol.   --> ref to neurology.   6.  STEPHANIE: CPAP.   --> stable.     -needs influenza vaccine, flu shot today  -CBC, CMP, Lipid, RF, CCP, ESR, CRP    PMH:   Past Medical History:   Diagnosis Date    Abnormal Pap smear of cervix     Arthritis     Back pain     Cervical disc syndrome     Depression     Essential tremor     General anesthetics causing adverse effect in therapeutic use     patient reports she was told she "woke up" during tubal ligation    Goiter     MNG    Hypothyroidism     Lumbar disc disease     Patient is Religious     Polycystic ovaries     PONV (postoperative nausea and vomiting)     Vitamin D deficiency      Surg Hx:   Past Surgical History:   Procedure Laterality Date    APPENDECTOMY      BREAST BIOPSY Left     Excisional removal of lymph node, benign    DILATION AND CURETTAGE OF UTERUS      FOOT MASS EXCISION Right 10/30/2018    Procedure: EXCISION, MASS, FOOT probable fibroma;  Surgeon: Ha Caicedo MD;  Location: [facility] OR [address] [place] [address];  Service: Orthopedics;  Laterality: Right;    HYSTERECTOMY  " 2017    [facility] ov in situ     OVARIAN CYST SURGERY Right     SHOULDER SURGERY      right    tubal lig       Fhx:   Family History   Problem Relation Age of Onset    Cancer Mother 50        breast    Breast cancer Mother     COPD Father     Peripheral vascular disease Father     Asperger's syndrome Daughter     Thyroid disease Daughter         Hashimotos'     Social Hx:   Social History     Socioeconomic History    Marital status:      Spouse name: Not on file    Number of children: Not on file    Years of education: Not on file    Highest education level: Not on file   Occupational History    Not on file   Social Needs    Financial resource strain: Not very hard    Food insecurity:     Worry: Never true     Inability: Never true    Transportation needs:     Medical: No     Non-medical: No   Tobacco Use    Smoking status: Former Smoker     Packs/day: 0.10     Years: 5.00     Pack years: 0.50     Types: Cigarettes     Last attempt to quit: 1992     Years since quittin.5    Smokeless tobacco: Former User   Substance and Sexual Activity    Alcohol use: Yes     Alcohol/week: 1.0 - 2.0 standard drinks     Types: 1 - 2 Glasses of wine per week     Frequency: 4 or more times a week     Drinks per session: 3 or 4     Binge frequency: Never     Comment: daily    Drug use: No    Sexual activity: Yes     Partners: Male     Birth control/protection: None     Comment:  to [place]    Lifestyle    Physical activity:     Days per week: 3 days     Minutes per session: 30 min    Stress: Not at all   Relationships    Social connections:     Talks on phone: More than three times a week     Gets together: More than three times a week     Attends Mosque service: Not on file     Active member of club or organization: Yes     Attends meetings of clubs or organizations: More than 4 times per year     Relationship status:    Other Topics Concern    Not on file   Social History  Narrative    Not on file       Here today for        Current Outpatient Medications on File Prior to Visit   Medication Sig Dispense Refill    ALPRAZolam (XANAX) 1 MG tablet TK 1 T PO  TID  5    cholecalciferol, vitamin D3, (VITAMIN D3) 1,000 unit capsule Take 5,000 Units by mouth.      estradiol (ESTRACE) 2 MG tablet TAKE 1 TABLET(2 MG) BY MOUTH EVERY DAY 90 tablet 3    levothyroxine (SYNTHROID) 112 MCG tablet Take 1 tablet (112 mcg total) by mouth once daily. 90 tablet 2    traMADol (ULTRAM) 50 mg tablet Take 50 mg by mouth every 6 (six) hours.      vitamin E 1000 UNIT capsule Take 1,000 Units by mouth.      methocarbamol (ROBAXIN) 750 MG Tab Take 1 tablet by mouth 2 (two) times daily as needed.  2    SUPREP BOWEL PREP KIT 17.5-3.13-1.6 gram SolR 1 application by Other route once.  0    [DISCONTINUED] carisoprodol (SOMA) 350 MG tablet Soma 350 mg tablet   Take 1 tablet every 6 hours by oral route as needed for 30 days.      [DISCONTINUED] HYDROcodone-acetaminophen (NORCO) 5-325 mg per tablet Take 1 tablet by mouth every 4 to 6 hours as needed for Pain. (Patient not taking: Reported on 2/5/2020) 42 tablet 0     No current facility-administered medications on file prior to visit.      I personally reviewed past medical, family and social history.  Review of Systems   Constitutional: Negative for activity change and fever.   HENT: Negative for sore throat and trouble swallowing.    Eyes: Negative for pain and visual disturbance.   Respiratory: Negative for cough, shortness of breath and wheezing.    Cardiovascular: Negative for chest pain, palpitations and leg swelling.   Gastrointestinal: Negative for abdominal pain, blood in stool, diarrhea, nausea and vomiting.   Endocrine: Negative for cold intolerance and polyuria.   Genitourinary: Negative for decreased urine volume and dysuria.   Musculoskeletal: Positive for arthralgias. Negative for gait problem and neck pain.   Skin: Negative for rash.    Neurological: Negative for dizziness, syncope and light-headedness.   Psychiatric/Behavioral: Negative for dysphoric mood. The patient is not nervous/anxious.        Objective:     Vitals:    02/05/20 0929   BP: 130/88   Pulse:    Temp:         Physical Exam   Constitutional: She is oriented to person, place, and time. She appears well-developed and well-nourished. No distress.   HENT:   Head: Normocephalic and atraumatic.   Eyes: Pupils are equal, round, and reactive to light. EOM are normal. Right eye exhibits no discharge. Left eye exhibits no discharge. No scleral icterus.   Neck: Normal range of motion. Neck supple. No JVD present. No thyromegaly present.   Cardiovascular: Normal rate, regular rhythm and normal heart sounds. Exam reveals no gallop and no friction rub.   No murmur heard.  Pulmonary/Chest: Effort normal and breath sounds normal. No respiratory distress. She has no wheezes.   Abdominal: Soft. Bowel sounds are normal. She exhibits no distension and no mass. There is no tenderness.   Musculoskeletal: Normal range of motion. She exhibits no edema.   Slight swelling of right hand PIP jts.    Lymphadenopathy:     She has no cervical adenopathy.   Neurological: She is alert and oriented to person, place, and time. No cranial nerve deficit. Coordination normal.   Slight head tremor.    Skin: Skin is warm and dry. Capillary refill takes less than 2 seconds. No rash noted. She is not diaphoretic.   Psychiatric: She has a normal mood and affect. Her behavior is normal.         Assessment/Plan   Ninfa was seen today for establish care.    Diagnoses and all orders for this visit:    Hypothyroidism due to Hashimoto's thyroiditis  -     TSH; Future    Class 2 obesity  -     CBC auto differential; Future  -     Comprehensive metabolic panel; Future  -     Lipid panel; Future    Arthralgia, unspecified joint  -     Rheumatoid factor; Future  -     CYCLIC CITRUL PEPTIDE ANTIBODY, IGG; Future  -      Sedimentation rate; Future  -     C-reactive protein; Future  -     Ambulatory referral/consult to Rheumatology; Future    Ledderhose's disease  -     Ambulatory referral/consult to Orthopedics; Future    Essential tremor  -     Ambulatory referral/consult to Neurology; Future    Screening for hyperlipidemia  -     Lipid panel; Future    Right upper quadrant abdominal pain  -     US Abdomen Complete; Future        Follow up in about 3 months (around 5/5/2020) for check up .

## 2020-02-05 ENCOUNTER — OFFICE VISIT (OUTPATIENT)
Dept: FAMILY MEDICINE | Facility: CLINIC | Age: 45
End: 2020-02-05
Payer: COMMERCIAL

## 2020-02-05 ENCOUNTER — HOSPITAL ENCOUNTER (OUTPATIENT)
Dept: RADIOLOGY | Facility: HOSPITAL | Age: 45
Discharge: HOME | End: 2020-02-05
Attending: INTERNAL MEDICINE
Payer: COMMERCIAL

## 2020-02-05 VITALS
HEART RATE: 72 BPM | WEIGHT: 250 LBS | BODY MASS INDEX: 37.89 KG/M2 | DIASTOLIC BLOOD PRESSURE: 88 MMHG | SYSTOLIC BLOOD PRESSURE: 130 MMHG | OXYGEN SATURATION: 97 % | TEMPERATURE: 97 F | HEIGHT: 68 IN

## 2020-02-05 DIAGNOSIS — E06.3 HYPOTHYROIDISM DUE TO HASHIMOTO'S THYROIDITIS: Primary | ICD-10-CM

## 2020-02-05 DIAGNOSIS — R10.11 RIGHT UPPER QUADRANT ABDOMINAL PAIN: ICD-10-CM

## 2020-02-05 DIAGNOSIS — Z13.220 SCREENING FOR HYPERLIPIDEMIA: ICD-10-CM

## 2020-02-05 DIAGNOSIS — M72.2 LEDDERHOSE'S DISEASE: ICD-10-CM

## 2020-02-05 DIAGNOSIS — M25.50 ARTHRALGIA, UNSPECIFIED JOINT: ICD-10-CM

## 2020-02-05 DIAGNOSIS — E66.9 CLASS 2 OBESITY: ICD-10-CM

## 2020-02-05 DIAGNOSIS — G25.0 ESSENTIAL TREMOR: ICD-10-CM

## 2020-02-05 DIAGNOSIS — E03.8 HYPOTHYROIDISM DUE TO HASHIMOTO'S THYROIDITIS: Primary | ICD-10-CM

## 2020-02-05 PROCEDURE — 76700 US ABDOMEN COMPLETE: ICD-10-PCS | Mod: 26,,, | Performed by: RADIOLOGY

## 2020-02-05 PROCEDURE — 3008F PR BODY MASS INDEX (BMI) DOCUMENTED: ICD-10-PCS | Mod: CPTII,S$GLB,, | Performed by: INTERNAL MEDICINE

## 2020-02-05 PROCEDURE — 99999 PR PBB SHADOW E&M-EST. PATIENT-LVL V: ICD-10-PCS | Mod: PBBFAC,,, | Performed by: INTERNAL MEDICINE

## 2020-02-05 PROCEDURE — 3008F BODY MASS INDEX DOCD: CPT | Mod: CPTII,S$GLB,, | Performed by: INTERNAL MEDICINE

## 2020-02-05 PROCEDURE — 99204 PR OFFICE/OUTPT VISIT, NEW, LEVL IV, 45-59 MIN: ICD-10-PCS | Mod: S$GLB,,, | Performed by: INTERNAL MEDICINE

## 2020-02-05 PROCEDURE — 99204 OFFICE O/P NEW MOD 45 MIN: CPT | Mod: S$GLB,,, | Performed by: INTERNAL MEDICINE

## 2020-02-05 PROCEDURE — 76700 US EXAM ABDOM COMPLETE: CPT | Mod: TC,PO

## 2020-02-05 PROCEDURE — 99999 PR PBB SHADOW E&M-EST. PATIENT-LVL V: CPT | Mod: PBBFAC,,, | Performed by: INTERNAL MEDICINE

## 2020-02-05 PROCEDURE — 76700 US EXAM ABDOM COMPLETE: CPT | Mod: 26,,, | Performed by: RADIOLOGY

## 2020-02-10 DIAGNOSIS — M79.671 RIGHT FOOT PAIN: Primary | ICD-10-CM

## 2020-04-30 ENCOUNTER — TELEPHONE (OUTPATIENT)
Dept: FAMILY MEDICINE | Facility: CLINIC | Age: 45
End: 2020-04-30

## 2020-05-18 ENCOUNTER — OFFICE VISIT (OUTPATIENT)
Dept: URGENT CARE | Facility: CLINIC | Age: 45
End: 2020-05-18
Payer: COMMERCIAL

## 2020-05-18 VITALS — TEMPERATURE: 99 F | HEART RATE: 88 BPM | OXYGEN SATURATION: 98 % | RESPIRATION RATE: 16 BRPM

## 2020-05-18 DIAGNOSIS — U07.1 COVID-19: Primary | ICD-10-CM

## 2020-05-18 PROCEDURE — 99211 OFF/OP EST MAY X REQ PHY/QHP: CPT | Mod: S$GLB,,, | Performed by: PHYSICIAN ASSISTANT

## 2020-05-18 PROCEDURE — U0003 INFECTIOUS AGENT DETECTION BY NUCLEIC ACID (DNA OR RNA); SEVERE ACUTE RESPIRATORY SYNDROME CORONAVIRUS 2 (SARS-COV-2) (CORONAVIRUS DISEASE [COVID-19]), AMPLIFIED PROBE TECHNIQUE, MAKING USE OF HIGH THROUGHPUT TECHNOLOGIES AS DESCRIBED BY CMS-2020-01-R: HCPCS

## 2020-05-18 PROCEDURE — 99211 PR OFFICE/OUTPT VISIT, EST, LEVL I: ICD-10-PCS | Mod: S$GLB,,, | Performed by: PHYSICIAN ASSISTANT

## 2020-05-19 NOTE — PATIENT INSTRUCTIONS
Instructions for Patients Awaiting COVID-19 Test Results    You will either be called with your test result or it will be released to the patient portal.  If you have any questions about your test, please visit [email or web address] or call our COVID-19 information line at [phone].    Prevention steps for patients with confirmed or suspected COVID-19     Stay home except to get medical care.   Separate yourself from other people and animals in your home   Call ahead before visiting your doctor.   Wear a facemask.   Cover your coughs and sneezes.   Clean your hands often.   Avoid sharing personal household items.   Clean all high-touch surfaces every day.   Monitor your symptoms. Seek prompt medical attention if your illness is worsening (e.g., difficulty breathing). Before seeking care, call your healthcare provider.   If you have a medical emergency and need to call 911, notify the dispatch personnel that you have, or are being evaluated for COVID-19. If possible, put on a facemask before emergency medical services arrive.   Discontinuing home isolation. Call your provider about guidance to discontinue home isolation.    Recommended precautions for household members, intimate partners, and caregivers in a nonhealthcare setting of a patient with symptomatic laboratory-confirmed COVID-19 or a patient under investigation.  Household members, intimate partners, and caregivers in a nonhealthcare setting may have close contact with a person with symptomatic, laboratory-confirmed COVID-19 or a person under investigation. Close contacts should monitor their health; they should call their healthcare provider right away if they develop symptoms suggestive of COVID-19 (e.g., fever, cough, shortness of breath).    Close contacts should also follow these recommendations:   Make sure that you understand and can help the patient follow their healthcare provider's instructions for medication(s) and care.  You should help the patient with basic needs in the home and provide support for getting groceries, prescriptions, and other personal needs.   Monitor the patient's symptoms. If the patient is getting sicker, call his or her healthcare provider and tell them that the patient has laboratory-confirmed COVID-19. This will help the healthcare provider's office take steps to keep other people in the office or waiting room from getting infected. Ask the healthcare provider to call the local or [facility] health department for additional guidance. If the patient has a medical emergency and you need to call 911, notify the dispatch personnel that the patient has, or is being evaluated for COVID-19.   Household members should stay in another room or be  from the patient as much as possible. Household members should use a separate bedroom and bathroom, if available.   Prohibit visitors who do not have an essential need to be in the home.   Household members should care for any pets in the home. Do not handle pets or other animals while sick.   Make sure that shared spaces in the home have good air flow, such as by an air conditioner or an opened window, weather permitting.   Perform hand hygiene frequently. Wash your hands often with soap and water for at least 20 seconds or use an alcohol-based hand  that contains 60 to 95% alcohol, covering all surfaces of your hands and rubbing them together until they feel dry. Soap and water should be used preferentially if hands are visibly dirty.   Avoid touching your eyes, nose, and mouth with unwashed hands.   The patient should wear a facemask when you are around other people. If the patient is not able to wear a facemask (for example, because it causes trouble breathing), you, as the caregiver should wear a mask when you are in the same room as the patient.   Wear a disposable facemask and gloves when you touch or have contact with the patient's blood, stool,  or body fluids, such as saliva, sputum, nasal mucus, vomit, urine.  o Throw out disposable facemasks and gloves after using them. Do not reuse.  o When removing personal protective equipment, first remove and dispose of gloves. Then, immediately clean your hands with soap and water or alcohol-based hand . Next, remove and dispose of facemask, and immediately clean your hands again with soap and water or alcohol-based hand .   Avoid sharing household items with the patient. You should not share dishes, drinking glasses, cups, eating utensils, towels, bedding, or other items. After the patient uses these items, you should wash them thoroughly (see below Wash laundry thoroughly).   Clean all high-touch surfaces, such as counters, tabletops, doorknobs, bathroom fixtures, toilets, phones, keyboards, tablets, and bedside tables, every day. Also, clean any surfaces that may have blood, stool, or body fluids on them.   Use a household cleaning spray or wipe, according to the label instructions. Labels contain instructions for safe and effective use of the cleaning product including precautions you should take when applying the product, such as wearing gloves and making sure you have good ventilation during use of the product.   Wash laundry thoroughly.  o Immediately remove and wash clothes or bedding that have blood, stool, or body fluids on them.  o Wear disposable gloves while handling soiled items and keep soiled items away from your body. Clean your hands (with soap and water or an alcohol-based hand ) immediately after removing your gloves.  o Read and follow directions on labels of laundry or clothing items and detergent. In general, using a normal laundry detergent according to washing machine instructions and dry thoroughly using the warmest temperatures recommended on the clothing label.   Place all used disposable gloves, facemasks, and other contaminated items in a lined  container before disposing of them with other household waste. Clean your hands (with soap and water or an alcohol-based hand ) immediately after handling these items. Soap and water should be used preferentially if hands are visibly dirty.   Discuss any additional questions with your state or local health department or healthcare provider. Check available hours when contacting your local health department.    For more information see CDC link below.      https://www.cdc.gov/coronavirus/2019-ncov/hcp/guidance-prevent-spread.html#precautions        Sources:  CDC, [place] Department of [facility] and [facility]        If not allergic,take tylenol (acetominophen) for fever control, chills, or body aches every 4 hours. Do not exceed 4000 mg/ day.If not allergic, take Motrin (Ibuprofen) every 4 hours for fever, chills, pain or inflammation. Do not exceed 2400 mg/day. You can alternate taking tylenol and motrin.    If you were prescribed a narcotic medication, do not drive or operate heavy equipment or machinery while taking these medications.  You must understand that you've received an Urgent Care treatment only and that you may be released before all your medical problems are known or treated. You, the patient, will arrange for follow up care as instructed.  Follow up with your PCP or specialty clinic as directed in the next 1-2 weeks if not improved or as needed.  You can call [phone] to schedule an appointment with the appropriate provider.  If your condition worsens we recommend that you receive another evaluation at the emergency room immediately or contact your primary medical clinics after hours call service to discuss your concerns.    Please return here or go to the Emergency Department for any concerns or worsening of condition.    If you have been referred to another provider and wish to call to check on the status of your referral, please call [facility] at [phone]

## 2020-05-19 NOTE — PROGRESS NOTES
"Subjective:       Patient ID: Ninfa Aguilar is a 44 y.o. female.    Vitals:  oral temperature is 98.8 °F (37.1 °C). Her pulse is 88. Her respiration is 16 and oxygen saturation is 98%.     Chief Complaint: Shortness of Breath    Pt presents to urgent care today with intermittent shortness of breath, heart palpitations.  She states that she has been having shortness of breath for the past few days.  She states that she would like to be tested just to be safe. She tried to take aspirin, allergy medications, and zanax.  She states that it calmed her down but she still has shortness of breath.  She also states that she has sleep apnea. She's been Taking Xanex and using her CPAP with moderate relief. "I live a very stressful life with work and family, so that may be contributing".     Shortness of Breath   This is a new problem. The current episode started in the past 7 days. The problem occurs constantly. The problem has been unchanged. Pertinent negatives include no chest pain, fever, headaches, leg swelling, rash, sore throat or vomiting. Nothing aggravates the symptoms. The patient has no known risk factors for DVT/PE. Treatments tried: aspirin. The treatment provided no relief.       Constitution: Negative for chills, fatigue and fever.   HENT: Negative for congestion and sore throat.    Neck: Negative for painful lymph nodes.   Cardiovascular: Positive for palpitations. Negative for chest pain and leg swelling.   Eyes: Negative for double vision and blurred vision.   Respiratory: Positive for shortness of breath. Negative for cough.    Gastrointestinal: Negative for nausea, vomiting and diarrhea.   Genitourinary: Negative for dysuria, frequency, urgency and history of kidney stones.   Musculoskeletal: Negative for joint pain, joint swelling, muscle cramps and muscle ache.   Skin: Negative for color change, pale, rash and bruising.   Allergic/Immunologic: Negative for seasonal allergies.   Neurological: " Negative for dizziness, history of vertigo, light-headedness, passing out and headaches.   Hematologic/Lymphatic: Negative for swollen lymph nodes.   Psychiatric/Behavioral: Negative for nervous/anxious, sleep disturbance and depression. The patient is not nervous/anxious.        Objective:      Physical Exam      Assessment:       1. COVID-19        Plan:         COVID-19  -     COVID-19 Routine Screening    Will call with test results.     Patient Instructions   Instructions for Patients Awaiting COVID-19 Test Results    You will either be called with your test result or it will be released to the patient portal.  If you have any questions about your test, please visit [email or web address] or call our COVID-19 information line at [phone].    Prevention steps for patients with confirmed or suspected COVID-19     Stay home except to get medical care.   Separate yourself from other people and animals in your home   Call ahead before visiting your doctor.   Wear a facemask.   Cover your coughs and sneezes.   Clean your hands often.   Avoid sharing personal household items.   Clean all high-touch surfaces every day.   Monitor your symptoms. Seek prompt medical attention if your illness is worsening (e.g., difficulty breathing). Before seeking care, call your healthcare provider.   If you have a medical emergency and need to call 911, notify the dispatch personnel that you have, or are being evaluated for COVID-19. If possible, put on a facemask before emergency medical services arrive.   Discontinuing home isolation. Call your provider about guidance to discontinue home isolation.    Recommended precautions for household members, intimate partners, and caregivers in a nonhealthcare setting of a patient with symptomatic laboratory-confirmed COVID-19 or a patient under investigation.  Household members, intimate partners, and caregivers in a nonhealthcare setting may have close contact with a  person with symptomatic, laboratory-confirmed COVID-19 or a person under investigation. Close contacts should monitor their health; they should call their healthcare provider right away if they develop symptoms suggestive of COVID-19 (e.g., fever, cough, shortness of breath).    Close contacts should also follow these recommendations:   Make sure that you understand and can help the patient follow their healthcare provider's instructions for medication(s) and care. You should help the patient with basic needs in the home and provide support for getting groceries, prescriptions, and other personal needs.   Monitor the patient's symptoms. If the patient is getting sicker, call his or her healthcare provider and tell them that the patient has laboratory-confirmed COVID-19. This will help the healthcare provider's office take steps to keep other people in the office or waiting room from getting infected. Ask the healthcare provider to call the local or [facility] health department for additional guidance. If the patient has a medical emergency and you need to call 911, notify the dispatch personnel that the patient has, or is being evaluated for COVID-19.   Household members should stay in another room or be  from the patient as much as possible. Household members should use a separate bedroom and bathroom, if available.   Prohibit visitors who do not have an essential need to be in the home.   Household members should care for any pets in the home. Do not handle pets or other animals while sick.   Make sure that shared spaces in the home have good air flow, such as by an air conditioner or an opened window, weather permitting.   Perform hand hygiene frequently. Wash your hands often with soap and water for at least 20 seconds or use an alcohol-based hand  that contains 60 to 95% alcohol, covering all surfaces of your hands and rubbing them together until they feel dry. Soap and water should be used  preferentially if hands are visibly dirty.   Avoid touching your eyes, nose, and mouth with unwashed hands.   The patient should wear a facemask when you are around other people. If the patient is not able to wear a facemask (for example, because it causes trouble breathing), you, as the caregiver should wear a mask when you are in the same room as the patient.   Wear a disposable facemask and gloves when you touch or have contact with the patient's blood, stool, or body fluids, such as saliva, sputum, nasal mucus, vomit, urine.  o Throw out disposable facemasks and gloves after using them. Do not reuse.  o When removing personal protective equipment, first remove and dispose of gloves. Then, immediately clean your hands with soap and water or alcohol-based hand . Next, remove and dispose of facemask, and immediately clean your hands again with soap and water or alcohol-based hand .   Avoid sharing household items with the patient. You should not share dishes, drinking glasses, cups, eating utensils, towels, bedding, or other items. After the patient uses these items, you should wash them thoroughly (see below Wash laundry thoroughly).   Clean all high-touch surfaces, such as counters, tabletops, doorknobs, bathroom fixtures, toilets, phones, keyboards, tablets, and bedside tables, every day. Also, clean any surfaces that may have blood, stool, or body fluids on them.   Use a household cleaning spray or wipe, according to the label instructions. Labels contain instructions for safe and effective use of the cleaning product including precautions you should take when applying the product, such as wearing gloves and making sure you have good ventilation during use of the product.   Wash laundry thoroughly.  o Immediately remove and wash clothes or bedding that have blood, stool, or body fluids on them.  o Wear disposable gloves while handling soiled items and keep soiled items away from  your body. Clean your hands (with soap and water or an alcohol-based hand ) immediately after removing your gloves.  o Read and follow directions on labels of laundry or clothing items and detergent. In general, using a normal laundry detergent according to washing machine instructions and dry thoroughly using the warmest temperatures recommended on the clothing label.   Place all used disposable gloves, facemasks, and other contaminated items in a lined container before disposing of them with other household waste. Clean your hands (with soap and water or an alcohol-based hand ) immediately after handling these items. Soap and water should be used preferentially if hands are visibly dirty.   Discuss any additional questions with your state or local health department or healthcare provider. Check available hours when contacting your local health department.    For more information see CDC link below.      https://www.cdc.gov/coronavirus/2019-ncov/hcp/guidance-prevent-spread.html#precautions        Sources:  [facility][note 3], [place] Department of Health and [facility]ists of hospitals in the [place]        If not allergic,take tylenol (acetominophen) for fever control, chills, or body aches every 4 hours. Do not exceed 4000 mg/ day.If not allergic, take Motrin (Ibuprofen) every 4 hours for fever, chills, pain or inflammation. Do not exceed 2400 mg/day. You can alternate taking tylenol and motrin.    If you were prescribed a narcotic medication, do not drive or operate heavy equipment or machinery while taking these medications.  You must understand that you've received an Urgent Care treatment only and that you may be released before all your medical problems are known or treated. You, the patient, will arrange for follow up care as instructed.  Follow up with your PCP or specialty clinic as directed in the next 1-2 weeks if not improved or as needed.  You can call [phone] to schedule an appointment with the appropriate  provider.  If your condition worsens we recommend that you receive another evaluation at the emergency room immediately or contact your primary medical clinics after hours call service to discuss your concerns.    Please return here or go to the Emergency Department for any concerns or worsening of condition.    If you have been referred to another provider and wish to call to check on the status of your referral, please call [facility] Sched[facility] at [phone]

## 2020-05-20 ENCOUNTER — TELEPHONE (OUTPATIENT)
Dept: FAMILY MEDICINE | Facility: CLINIC | Age: 45
End: 2020-05-20

## 2020-05-20 LAB — SARS-COV-2 RNA RESP QL NAA+PROBE: NOT DETECTED

## 2020-05-20 NOTE — TELEPHONE ENCOUNTER
Spoke with patient she was tested for covid on 5/18 urgent care said her test will be back today or tomorrow. The patient reports sob on exertion and some pain in her chest. I informed the patient to go to [facility].

## 2020-05-21 ENCOUNTER — TELEPHONE (OUTPATIENT)
Dept: URGENT CARE | Facility: CLINIC | Age: 45
End: 2020-05-21

## 2020-06-02 ENCOUNTER — HOSPITAL ENCOUNTER (OUTPATIENT)
Dept: RADIOLOGY | Facility: HOSPITAL | Age: 45
Discharge: HOME | End: 2020-06-02
Attending: OBSTETRICS & GYNECOLOGY
Payer: COMMERCIAL

## 2020-06-02 DIAGNOSIS — Z12.31 SCREENING MAMMOGRAM, ENCOUNTER FOR: ICD-10-CM

## 2020-06-02 PROCEDURE — 77063 BREAST TOMOSYNTHESIS BI: CPT | Mod: 26,,, | Performed by: RADIOLOGY

## 2020-06-02 PROCEDURE — 77067 SCR MAMMO BI INCL CAD: CPT | Mod: TC,PO

## 2020-06-02 PROCEDURE — 77063 MAMMO DIGITAL SCREENING BILAT WITH TOMOSYNTHESIS_CAD: ICD-10-PCS | Mod: 26,,, | Performed by: RADIOLOGY

## 2020-06-02 PROCEDURE — 77067 SCR MAMMO BI INCL CAD: CPT | Mod: 26,,, | Performed by: RADIOLOGY

## 2020-06-02 PROCEDURE — 77067 MAMMO DIGITAL SCREENING BILAT WITH TOMOSYNTHESIS_CAD: ICD-10-PCS | Mod: 26,,, | Performed by: RADIOLOGY

## 2020-06-03 ENCOUNTER — OFFICE VISIT (OUTPATIENT)
Dept: URGENT CARE | Facility: CLINIC | Age: 45
End: 2020-06-03
Payer: COMMERCIAL

## 2020-06-03 VITALS
TEMPERATURE: 99 F | HEART RATE: 82 BPM | WEIGHT: 244 LBS | RESPIRATION RATE: 14 BRPM | HEIGHT: 69 IN | BODY MASS INDEX: 36.14 KG/M2 | OXYGEN SATURATION: 95 %

## 2020-06-03 DIAGNOSIS — J32.9 CLINICAL SINUSITIS: Primary | ICD-10-CM

## 2020-06-03 PROCEDURE — 99213 OFFICE O/P EST LOW 20 MIN: CPT | Mod: S$GLB,,, | Performed by: FAMILY MEDICINE

## 2020-06-03 PROCEDURE — 99213 PR OFFICE/OUTPT VISIT, EST, LEVL III, 20-29 MIN: ICD-10-PCS | Mod: S$GLB,,, | Performed by: FAMILY MEDICINE

## 2020-06-03 RX ORDER — AZITHROMYCIN 250 MG/1
TABLET, FILM COATED ORAL
Qty: 6 TABLET | Refills: 0 | Status: SHIPPED | OUTPATIENT
Start: 2020-06-03 | End: 2020-06-08

## 2020-06-03 NOTE — PATIENT INSTRUCTIONS

## 2020-06-03 NOTE — PROGRESS NOTES
"Subjective:       Patient ID: Ninfa Aguilar is a 44 y.o. female.    Vitals:  height is 5' 9" (1.753 m) and weight is 110.7 kg (244 lb). Her temperature is 98.7 °F (37.1 °C). Her pulse is 82. Her respiration is 14 and oxygen saturation is 95%.     Chief Complaint: Sinus Problem    Pt presents today with headache, cough, sinus pressure, and fever x3 weeks. Pt states about 3 weeks ago she began to experience sob and palpitations which have both improved since onset.    Sinus Problem   This is a recurrent problem. The current episode started 1 to 4 weeks ago. The problem has been gradually worsening since onset. The maximum temperature recorded prior to her arrival was 100.4 - 100.9 F. The fever has been present for 3 to 4 days. Her pain is at a severity of 4/10. The pain is moderate. Associated symptoms include chills, congestion, coughing, diaphoresis, ear pain, headaches, sinus pressure, sneezing and a sore throat. Pertinent negatives include no hoarse voice, neck pain, shortness of breath or swollen glands. Past treatments include acetaminophen (mucinex and chloraseptic). The treatment provided mild relief.       Constitution: Positive for chills and sweating. Negative for fatigue and fever.   HENT: Positive for ear pain, congestion, sinus pressure and sore throat. Negative for sinus pain and voice change.    Neck: Negative for neck pain and painful lymph nodes.   Eyes: Negative for eye redness.   Respiratory: Positive for cough. Negative for chest tightness, sputum production, bloody sputum, COPD, shortness of breath, stridor, wheezing and asthma.    Gastrointestinal: Negative for nausea and vomiting.   Musculoskeletal: Negative for muscle ache.   Skin: Negative for rash.   Allergic/Immunologic: Positive for sneezing. Negative for seasonal allergies and asthma.   Neurological: Positive for headaches.   Hematologic/Lymphatic: Negative for swollen lymph nodes.       Objective:      Physical Exam    Physicql " done remotely due to COVID-19 pandemic  Assessment:       1. Clinical sinusitis        Plan:         Clinical sinusitis    Other orders  -     azithromycin (Z-PAOLO) 250 MG tablet; Take 2 tablets by mouth on day 1; Take 1 tablet by mouth on days 2-5  Dispense: 6 tablet; Refill: 0

## 2020-06-03 NOTE — LETTER
Mar 3, 2020      [facility]  [address], [address]  [facility] [address]  Phone: [phone]  Fax: [phone]       Patient: Ninfa Aguilar   YOB: 1975  Date of Visit: 06/03/2020    To Whom It May Concern:    Jennifer Aguilar  was at [facility] on 06/03/2020. She may return to work/school on 06/04/2020 with no restrictions. If you have any questions or concerns, or if I can be of further assistance, please do not hesitate to contact me.    Sincerely,    Dinesh Toledo MA

## 2020-09-28 DIAGNOSIS — N95.1 MENOPAUSAL SYMPTOMS: ICD-10-CM

## 2020-09-28 RX ORDER — ESTRADIOL 2 MG/1
2 TABLET ORAL DAILY
Qty: 90 TABLET | Refills: 1 | Status: SHIPPED | OUTPATIENT
Start: 2020-09-28 | End: 2022-01-06 | Stop reason: SDUPTHER

## 2020-09-28 NOTE — TELEPHONE ENCOUNTER
----- Message from Naomi Flores sent at 9/28/2020 10:57 AM CDT -----  Contact: VIRGINIA LOZANO [[id number]]  Type: RX Refill Request    Who Called:  VIRGINIA LOZANO [[id number]]    RX Name and Strength: estradiol (ESTRACE) 2 MG tablet    Preferred Pharmacy with phone number: ..  [id number] [place] [address] & [address] [address]  Phone: [phone] Fax: [phone]      Best Call Back Number: .[phone]    Additional Information: N/A

## 2020-11-17 ENCOUNTER — OFFICE VISIT (OUTPATIENT)
Dept: FAMILY MEDICINE | Facility: CLINIC | Age: 45
End: 2020-11-17
Payer: COMMERCIAL

## 2020-11-17 VITALS
HEIGHT: 69 IN | BODY MASS INDEX: 36.11 KG/M2 | SYSTOLIC BLOOD PRESSURE: 130 MMHG | HEART RATE: 51 BPM | TEMPERATURE: 97 F | OXYGEN SATURATION: 98 % | DIASTOLIC BLOOD PRESSURE: 72 MMHG | WEIGHT: 243.81 LBS

## 2020-11-17 DIAGNOSIS — M48.02 SPINAL STENOSIS, CERVICAL REGION: ICD-10-CM

## 2020-11-17 DIAGNOSIS — M54.2 NECK PAIN: Primary | ICD-10-CM

## 2020-11-17 DIAGNOSIS — M48.02 CERVICAL STENOSIS OF SPINAL CANAL: ICD-10-CM

## 2020-11-17 DIAGNOSIS — K64.8 INTERNAL HEMORRHOIDS: ICD-10-CM

## 2020-11-17 PROCEDURE — 99214 PR OFFICE/OUTPT VISIT, EST, LEVL IV, 30-39 MIN: ICD-10-PCS | Mod: S$GLB,,, | Performed by: INTERNAL MEDICINE

## 2020-11-17 PROCEDURE — 1125F AMNT PAIN NOTED PAIN PRSNT: CPT | Mod: S$GLB,,, | Performed by: INTERNAL MEDICINE

## 2020-11-17 PROCEDURE — 99214 OFFICE O/P EST MOD 30 MIN: CPT | Mod: S$GLB,,, | Performed by: INTERNAL MEDICINE

## 2020-11-17 PROCEDURE — 99999 PR PBB SHADOW E&M-EST. PATIENT-LVL V: CPT | Mod: PBBFAC,,, | Performed by: INTERNAL MEDICINE

## 2020-11-17 PROCEDURE — 3008F BODY MASS INDEX DOCD: CPT | Mod: CPTII,S$GLB,, | Performed by: INTERNAL MEDICINE

## 2020-11-17 PROCEDURE — 3008F PR BODY MASS INDEX (BMI) DOCUMENTED: ICD-10-PCS | Mod: CPTII,S$GLB,, | Performed by: INTERNAL MEDICINE

## 2020-11-17 PROCEDURE — 99999 PR PBB SHADOW E&M-EST. PATIENT-LVL V: ICD-10-PCS | Mod: PBBFAC,,, | Performed by: INTERNAL MEDICINE

## 2020-11-17 PROCEDURE — 1125F PR PAIN SEVERITY QUANTIFIED, PAIN PRESENT: ICD-10-PCS | Mod: S$GLB,,, | Performed by: INTERNAL MEDICINE

## 2020-11-17 RX ORDER — GABAPENTIN 300 MG/1
CAPSULE ORAL
COMMUNITY
Start: 2020-11-14

## 2020-11-17 RX ORDER — LOSARTAN POTASSIUM 50 MG/1
TABLET ORAL
COMMUNITY
Start: 2020-11-05

## 2020-11-17 RX ORDER — NAPROXEN SODIUM 220 MG/1
TABLET, FILM COATED ORAL
COMMUNITY

## 2020-11-17 RX ORDER — IBUPROFEN AND FAMOTIDINE 800; 26.6 MG/1; MG/1
TABLET, COATED ORAL
COMMUNITY
End: 2021-07-21

## 2020-11-17 RX ORDER — METOPROLOL SUCCINATE 50 MG/1
TABLET, EXTENDED RELEASE ORAL
COMMUNITY
Start: 2020-11-05

## 2020-11-17 RX ORDER — CARISOPRODOL 350 MG/1
350 TABLET ORAL 4 TIMES DAILY
Qty: 40 TABLET | Refills: 0 | Status: SHIPPED | OUTPATIENT
Start: 2020-11-17 | End: 2020-11-27

## 2020-11-17 RX ORDER — ATORVASTATIN CALCIUM 20 MG/1
40 TABLET, FILM COATED ORAL NIGHTLY
COMMUNITY
Start: 2020-11-05

## 2020-11-17 NOTE — PROGRESS NOTES
Subjective:       Patient ID: Ninfa Aguilar is a 45 y.o. female.    Chief Complaint: Neck Pain      Social Hx:  From [place]. Living in [place] now. . Has 2 children. Works from home as . [employer]    PMH:  1.  Hypothyroidism:  2.  Ledderhose dz of bilateral feet.  3.  Class 2 obesity  4.  Essential tremor:  Past treatments include primidone and propanolol  5.  STEPHANIE:  CPAP  6.  CAD    HPI:   She saw cards for dyspnea was dx with CAD. No records in chart. Does not want to switch cards to here.   Having neck shocking sensation to shoulder BL. This has worsening since last week. Has hx of cervical stenosis. MRI is in  system but no report. Some upper extremity weakness no leg weakness. She takes gabapentin for neuropathy in feet. She has hx of internal hemorrhoids     A/P:   Neck sensations could be 2/2 cervical stenosis. Will check MRI. Ref back and spine. She states soma has worked well in the past.   Ref gen surg for hemorrhoids      Health Maintenance:         Current Outpatient Medications on File Prior to Visit   Medication Sig Dispense Refill    aspirin 81 MG Chew aspirin 81 mg chewable tablet      atorvastatin (LIPITOR) 20 MG tablet TK 1 T PO HS      cholecalciferol, vitamin D3, (VITAMIN D3) 1,000 unit capsule Take 5,000 Units by mouth.      estradioL (ESTRACE) 2 MG tablet Take 1 tablet (2 mg total) by mouth once daily. TAKE 1 TABLET(2 MG) BY MOUTH EVERY DAY 90 tablet 1    gabapentin (NEURONTIN) 300 MG capsule       ibuprofen-famotidine (DUEXIS) 800-26.6 mg Tab Duexis 800 mg-26.6 mg tablet   Take 1 tablet 3 times a day by oral route.      levothyroxine (SYNTHROID) 112 MCG tablet Take 1 tablet (112 mcg total) by mouth once daily. 90 tablet 2    losartan (COZAAR) 50 MG tablet TK 1 T PO QD      metoprolol succinate (TOPROL-XL) 50 MG 24 hr tablet TK 1 T PO QD      vitamin E 1000 UNIT capsule Take 1,000 Units by mouth.      albuterol (PROVENTIL/VENTOLIN HFA) 90  mcg/actuation inhaler Inhale 1-2 puffs into the lungs every 6 (six) hours as needed for Wheezing. Rescue (Patient not taking: Reported on 11/17/2020) 1 g 0    ALPRAZolam (XANAX) 1 MG tablet TK 1 T PO  TID  5    methocarbamol (ROBAXIN) 750 MG Tab Take 1 tablet by mouth 2 (two) times daily as needed.  2    SUPREP BOWEL PREP KIT 17.5-3.13-1.6 gram SolR 1 application by Other route once.  0    traMADol (ULTRAM) 50 mg tablet Take 50 mg by mouth every 6 (six) hours.       No current facility-administered medications on file prior to visit.      I personally reviewed past medical, family and social history.  Review of Systems   Constitutional: Negative for activity change and fever.   HENT: Negative for sore throat and trouble swallowing.    Eyes: Negative for pain and visual disturbance.   Respiratory: Negative for cough, shortness of breath and wheezing.    Cardiovascular: Negative for chest pain, palpitations and leg swelling.   Gastrointestinal: Negative for abdominal pain, blood in stool, diarrhea, nausea and vomiting.   Endocrine: Negative for cold intolerance and polyuria.   Genitourinary: Negative for decreased urine volume and dysuria.   Musculoskeletal: Positive for neck pain. Negative for gait problem.   Skin: Negative for rash.   Neurological: Negative for dizziness, syncope and light-headedness.   Psychiatric/Behavioral: Negative for dysphoric mood. The patient is not nervous/anxious.          Objective:     Vitals:    11/17/20 1456   BP: 130/72   Pulse: (!) 51   Temp: 97.4 °F (36.3 °C)        Physical Exam  Constitutional:       General: She is not in acute distress.     Appearance: She is well-developed.   HENT:      Head: Normocephalic and atraumatic.   Eyes:      Pupils: Pupils are equal, round, and reactive to light.   Neck:      Musculoskeletal: Neck supple.      Thyroid: No thyromegaly.   Cardiovascular:      Rate and Rhythm: Normal rate and regular rhythm.      Heart sounds: Normal heart sounds. No  murmur. No friction rub. No gallop.    Pulmonary:      Effort: Pulmonary effort is normal. No respiratory distress.      Breath sounds: Normal breath sounds. No wheezing.   Abdominal:      General: Bowel sounds are normal.      Palpations: Abdomen is soft.      Tenderness: There is no abdominal tenderness.   Musculoskeletal:      Comments: Very tight cervical paraspinals   Negative spurlings   Skin:     General: Skin is warm.      Findings: No rash.   Neurological:      Mental Status: She is alert and oriented to person, place, and time.      Cranial Nerves: No cranial nerve deficit.   Psychiatric:         Behavior: Behavior normal.           Assessment/Plan   Ninfa was seen today for neck pain.    Diagnoses and all orders for this visit:    Neck pain  -     carisoprodoL (SOMA) 350 MG tablet; Take 1 tablet (350 mg total) by mouth 4 (four) times daily. for 10 days  -     MRI Cervical Spine Without Contrast; Future  -     Ambulatory referral/consult to Back [facility] Spine Clinic; Future    Cervical stenosis of spinal canal  -     carisoprodoL (SOMA) 350 MG tablet; Take 1 tablet (350 mg total) by mouth 4 (four) times daily. for 10 days  -     MRI Cervical Spine Without Contrast; Future  -     Ambulatory referral/consult to [facility] Sp[facility] Clinic; Future    Spinal stenosis, cervical region  -     MRI Cervical Spine Without Contrast; Future  -     Ambulatory referral/consult to Back [facility] Spine Clinic; Future    Internal hemorrhoids  -     Ambulatory referral/consult to General Surgery; Future

## 2020-11-30 ENCOUNTER — PATIENT OUTREACH (OUTPATIENT)
Dept: ADMINISTRATIVE | Facility: OTHER | Age: 45
End: 2020-11-30

## 2020-12-01 ENCOUNTER — OFFICE VISIT (OUTPATIENT)
Dept: OBSTETRICS AND GYNECOLOGY | Facility: CLINIC | Age: 45
End: 2020-12-01
Payer: COMMERCIAL

## 2020-12-01 VITALS
HEIGHT: 69 IN | WEIGHT: 246.06 LBS | SYSTOLIC BLOOD PRESSURE: 122 MMHG | DIASTOLIC BLOOD PRESSURE: 78 MMHG | BODY MASS INDEX: 36.44 KG/M2

## 2020-12-01 DIAGNOSIS — Z12.31 SCREENING MAMMOGRAM, ENCOUNTER FOR: ICD-10-CM

## 2020-12-01 DIAGNOSIS — Z01.419 ENCOUNTER FOR GYNECOLOGICAL EXAMINATION WITHOUT ABNORMAL FINDING: Primary | ICD-10-CM

## 2020-12-01 PROCEDURE — 99999 PR PBB SHADOW E&M-EST. PATIENT-LVL IV: ICD-10-PCS | Mod: PBBFAC,,, | Performed by: OBSTETRICS & GYNECOLOGY

## 2020-12-01 PROCEDURE — 1126F AMNT PAIN NOTED NONE PRSNT: CPT | Mod: S$GLB,,, | Performed by: OBSTETRICS & GYNECOLOGY

## 2020-12-01 PROCEDURE — 99396 PR PREVENTIVE VISIT,EST,40-64: ICD-10-PCS | Mod: S$GLB,,, | Performed by: OBSTETRICS & GYNECOLOGY

## 2020-12-01 PROCEDURE — 99396 PREV VISIT EST AGE 40-64: CPT | Mod: S$GLB,,, | Performed by: OBSTETRICS & GYNECOLOGY

## 2020-12-01 PROCEDURE — 1126F PR PAIN SEVERITY QUANTIFIED, NO PAIN PRESENT: ICD-10-PCS | Mod: S$GLB,,, | Performed by: OBSTETRICS & GYNECOLOGY

## 2020-12-01 PROCEDURE — 3008F PR BODY MASS INDEX (BMI) DOCUMENTED: ICD-10-PCS | Mod: CPTII,S$GLB,, | Performed by: OBSTETRICS & GYNECOLOGY

## 2020-12-01 PROCEDURE — 3008F BODY MASS INDEX DOCD: CPT | Mod: CPTII,S$GLB,, | Performed by: OBSTETRICS & GYNECOLOGY

## 2020-12-01 PROCEDURE — 99999 PR PBB SHADOW E&M-EST. PATIENT-LVL IV: CPT | Mod: PBBFAC,,, | Performed by: OBSTETRICS & GYNECOLOGY

## 2020-12-01 RX ORDER — LEVOTHYROXINE SODIUM 112 UG/1
TABLET ORAL
COMMUNITY
End: 2021-07-21 | Stop reason: DRUGHIGH

## 2020-12-01 RX ORDER — CARISOPRODOL 350 MG/1
TABLET ORAL
COMMUNITY
End: 2021-11-30 | Stop reason: SDUPTHER

## 2020-12-01 NOTE — PROGRESS NOTES
"Well-woman exam    Ninfa Aguilar is a 45 y.o. year old  who presents for annual exam.  She is S/P robotic hysterectomy with conservation of ovaries.  Patient reports intermittent hot flashes which all resolved with ERT therapy.  Patient reports that she is not taking ERT daily (estimates 2-3 times per week).    Of note patient reports that she was recently diagnosed with a small blockage in her heart.    Past Medical History:   Diagnosis Date    Abnormal Pap smear of cervix     Arthritis     Back pain     Cervical disc syndrome     Depression     Essential tremor     General anesthetics causing adverse effect in therapeutic use     patient reports she was told she "woke up" during tubal ligation    Goiter     MNG    Hypothyroidism     Lumbar disc disease     Patient is Caodaism     Polycystic ovaries     PONV (postoperative nausea and vomiting)     Vitamin D deficiency        Past Surgical History:   Procedure Laterality Date    APPENDECTOMY      BREAST BIOPSY Left     Excisional removal of lymph node, benign    DILATION AND CURETTAGE OF UTERUS      FOOT MASS EXCISION Right 10/30/2018    Procedure: EXCISION, MASS, FOOT probable fibroma;  Surgeon: Ha Caicedo MD;  Location: [facility] OR [address] [place] [address];  Service: Orthopedics;  Laterality: Right;    HYSTERECTOMY  2017    DL ov in situ     OVARIAN CYST SURGERY Right     SHOULDER SURGERY      right    tubal lig         OB History        2    Para   2    Term   2            AB        Living           SAB        TAB        Ectopic        Multiple        Live Births                     ROS:  GENERAL: Feeling well overall.   SKIN: Denies rash or lesions.   HEAD: Denies head injury or headache.   NODES: Denies enlarged lymph nodes.   CHEST: Denies chest pain or shortness of breath.   CARDIOVASCULAR: Denies palpitations or left sided chest pain.   ABDOMEN: No abdominal pain, nausea, vomiting or rectal " bleeding.   URINARY: No dysuria, hematuria, or burning on urination.  REPRODUCTIVE: See HPI.   BREASTS: Denies pain, lumps, or nipple discharge.   HEMATOLOGIC: No easy bruisability or excessive bleeding.   MUSCULOSKELETAL: Denies joint pain or swelling.   NEUROLOGIC: Denies syncope or weakness.   PSYCHIATRIC: Denies depression.    PE:   APPEARANCE: Well nourished, well developed, in no acute distress.  NECK: Neck symmetric without masses or thyromegaly.  NODES: No inguinal lymph node enlargement.  ABDOMEN: Soft. No tenderness or masses. No hernias.  BREASTS: Symmetrical, no skin changes or visible lesions. No palpable masses, nipple discharge or adenopathy bilaterally.  PELVIC: Normal external female genitalia.  Left labia minora with small adenosis/skin tag x 2 (<3 mm).   Normal hair distribution. Adequate perineal body, normal urethral meatus. Vagina without lesions or discharge.  Vaginal cuff intact.  No significant cystocele or rectocele. Uterus and cervix surgically absent. Bimanual exam revealed no masses, tenderness or abnormality.  ANUS: Normal.    Diagnosis:  1. Encounter for gynecological examination without abnormal finding    2. Screening mammogram, encounter for      PLAN:    Orders Placed This Encounter    Mammo Digital Screening Bilat w/ [facility]       Patient was counseled today on postmenopausal issues.     I have counseled patient on increased risk of thrombosis with coronary artery disease and estrogen use.  Patient will contact her cardiologist for clearance to continue to use Estrace for menopausal symptoms.  Patient is concerned about increasing hot flashes and desires to continue treatment if cleared.    Discussed benign findings of left labia minora lesions.  If patient desires removal, patient contact office.    Mammogram order placed today.    Follow up in about 1 year (around 12/1/2021) for Annual exam.     Tr Roberts IV, MD

## 2020-12-28 ENCOUNTER — TELEPHONE (OUTPATIENT)
Dept: FAMILY MEDICINE | Facility: CLINIC | Age: 45
End: 2020-12-28

## 2020-12-28 NOTE — TELEPHONE ENCOUNTER
----- Message from Jocy Stanford sent at 12/28/2020  8:53 AM CST -----  Good morning,    Patient scheduled for tomorrow, 12/29, at [facility] for mri. Per [facility] pre-cert authorization was denied and appeal will need to be done. Will need by 3pm today to keep patient on schedule. I've included info provided by [facility] pre[facility]cert below. Please advise by 3pm as to how to proceed.     Thanks,    Jocy MANUEL  [facility] Pre-cert  [phone]      [facility] has denied, please call [phone] option 3 with case # [id number] to do an appeal.

## 2020-12-28 NOTE — TELEPHONE ENCOUNTER
Jocy was told Dr. Hall was out of the office today and the pt will have to call back to the office to discuss the appeal.

## 2020-12-29 ENCOUNTER — PATIENT MESSAGE (OUTPATIENT)
Dept: FAMILY MEDICINE | Facility: CLINIC | Age: 45
End: 2020-12-29

## 2020-12-29 DIAGNOSIS — M54.12 CERVICAL RADICULOPATHY: ICD-10-CM

## 2020-12-29 DIAGNOSIS — R29.898 UPPER EXTREMITY WEAKNESS: ICD-10-CM

## 2020-12-29 DIAGNOSIS — M54.2 NECK PAIN: Primary | ICD-10-CM

## 2020-12-29 DIAGNOSIS — M48.02 CERVICAL STENOSIS OF SPINAL CANAL: ICD-10-CM

## 2021-01-28 ENCOUNTER — PATIENT MESSAGE (OUTPATIENT)
Dept: ADMINISTRATIVE | Facility: CLINIC | Age: 46
End: 2021-01-28

## 2021-05-06 ENCOUNTER — PATIENT MESSAGE (OUTPATIENT)
Dept: RESEARCH | Facility: HOSPITAL | Age: 46
End: 2021-05-06

## 2021-06-09 ENCOUNTER — HOSPITAL ENCOUNTER (OUTPATIENT)
Dept: RADIOLOGY | Facility: HOSPITAL | Age: 46
Discharge: HOME | End: 2021-06-09
Attending: OBSTETRICS & GYNECOLOGY
Payer: COMMERCIAL

## 2021-06-09 ENCOUNTER — PATIENT MESSAGE (OUTPATIENT)
Dept: OBSTETRICS AND GYNECOLOGY | Facility: CLINIC | Age: 46
End: 2021-06-09

## 2021-06-09 DIAGNOSIS — Z12.31 SCREENING MAMMOGRAM, ENCOUNTER FOR: ICD-10-CM

## 2021-06-09 PROCEDURE — 77067 SCR MAMMO BI INCL CAD: CPT | Mod: 26,,, | Performed by: RADIOLOGY

## 2021-06-09 PROCEDURE — 77063 BREAST TOMOSYNTHESIS BI: CPT | Mod: 26,,, | Performed by: RADIOLOGY

## 2021-06-09 PROCEDURE — 77067 MAMMO DIGITAL SCREENING BILAT WITH TOMO: ICD-10-PCS | Mod: 26,,, | Performed by: RADIOLOGY

## 2021-06-09 PROCEDURE — 77067 SCR MAMMO BI INCL CAD: CPT | Mod: TC,PO

## 2021-06-09 PROCEDURE — 77063 MAMMO DIGITAL SCREENING BILAT WITH TOMO: ICD-10-PCS | Mod: 26,,, | Performed by: RADIOLOGY

## 2021-07-21 ENCOUNTER — TELEPHONE (OUTPATIENT)
Dept: NEUROSURGERY | Facility: CLINIC | Age: 46
End: 2021-07-21

## 2021-07-21 ENCOUNTER — OFFICE VISIT (OUTPATIENT)
Dept: FAMILY MEDICINE | Facility: CLINIC | Age: 46
End: 2021-07-21
Payer: COMMERCIAL

## 2021-07-21 VITALS
HEART RATE: 64 BPM | DIASTOLIC BLOOD PRESSURE: 78 MMHG | TEMPERATURE: 98 F | HEIGHT: 69 IN | BODY MASS INDEX: 36.6 KG/M2 | OXYGEN SATURATION: 98 % | SYSTOLIC BLOOD PRESSURE: 118 MMHG | WEIGHT: 247.13 LBS

## 2021-07-21 DIAGNOSIS — M48.02 CERVICAL STENOSIS OF SPINAL CANAL: Primary | ICD-10-CM

## 2021-07-21 PROCEDURE — 3008F PR BODY MASS INDEX (BMI) DOCUMENTED: ICD-10-PCS | Mod: CPTII,S$GLB,, | Performed by: PHYSICIAN ASSISTANT

## 2021-07-21 PROCEDURE — 1159F PR MEDICATION LIST DOCUMENTED IN MEDICAL RECORD: ICD-10-PCS | Mod: CPTII,S$GLB,, | Performed by: PHYSICIAN ASSISTANT

## 2021-07-21 PROCEDURE — 99999 PR PBB SHADOW E&M-EST. PATIENT-LVL V: ICD-10-PCS | Mod: PBBFAC,,, | Performed by: PHYSICIAN ASSISTANT

## 2021-07-21 PROCEDURE — 3074F SYST BP LT 130 MM HG: CPT | Mod: CPTII,S$GLB,, | Performed by: PHYSICIAN ASSISTANT

## 2021-07-21 PROCEDURE — 3078F PR MOST RECENT DIASTOLIC BLOOD PRESSURE < 80 MM HG: ICD-10-PCS | Mod: CPTII,S$GLB,, | Performed by: PHYSICIAN ASSISTANT

## 2021-07-21 PROCEDURE — 1125F AMNT PAIN NOTED PAIN PRSNT: CPT | Mod: CPTII,S$GLB,, | Performed by: PHYSICIAN ASSISTANT

## 2021-07-21 PROCEDURE — 99999 PR PBB SHADOW E&M-EST. PATIENT-LVL V: CPT | Mod: PBBFAC,,, | Performed by: PHYSICIAN ASSISTANT

## 2021-07-21 PROCEDURE — 1159F MED LIST DOCD IN RCRD: CPT | Mod: CPTII,S$GLB,, | Performed by: PHYSICIAN ASSISTANT

## 2021-07-21 PROCEDURE — 99213 PR OFFICE/OUTPT VISIT, EST, LEVL III, 20-29 MIN: ICD-10-PCS | Mod: S$GLB,,, | Performed by: PHYSICIAN ASSISTANT

## 2021-07-21 PROCEDURE — 99213 OFFICE O/P EST LOW 20 MIN: CPT | Mod: S$GLB,,, | Performed by: PHYSICIAN ASSISTANT

## 2021-07-21 PROCEDURE — 1125F PR PAIN SEVERITY QUANTIFIED, PAIN PRESENT: ICD-10-PCS | Mod: CPTII,S$GLB,, | Performed by: PHYSICIAN ASSISTANT

## 2021-07-21 PROCEDURE — 1160F PR REVIEW ALL MEDS BY PRESCRIBER/CLIN PHARMACIST DOCUMENTED: ICD-10-PCS | Mod: CPTII,S$GLB,, | Performed by: PHYSICIAN ASSISTANT

## 2021-07-21 PROCEDURE — 1160F RVW MEDS BY RX/DR IN RCRD: CPT | Mod: CPTII,S$GLB,, | Performed by: PHYSICIAN ASSISTANT

## 2021-07-21 PROCEDURE — 3074F PR MOST RECENT SYSTOLIC BLOOD PRESSURE < 130 MM HG: ICD-10-PCS | Mod: CPTII,S$GLB,, | Performed by: PHYSICIAN ASSISTANT

## 2021-07-21 PROCEDURE — 3078F DIAST BP <80 MM HG: CPT | Mod: CPTII,S$GLB,, | Performed by: PHYSICIAN ASSISTANT

## 2021-07-21 PROCEDURE — 3008F BODY MASS INDEX DOCD: CPT | Mod: CPTII,S$GLB,, | Performed by: PHYSICIAN ASSISTANT

## 2021-07-21 RX ORDER — HYDROCHLOROTHIAZIDE 12.5 MG/1
12.5 TABLET ORAL DAILY
COMMUNITY
Start: 2021-05-24 | End: 2022-11-15

## 2021-07-21 RX ORDER — LEVOTHYROXINE SODIUM 100 UG/1
TABLET ORAL
COMMUNITY

## 2021-07-26 ENCOUNTER — HOSPITAL ENCOUNTER (OUTPATIENT)
Dept: RADIOLOGY | Facility: HOSPITAL | Age: 46
Discharge: HOME | End: 2021-07-26
Attending: PHYSICIAN ASSISTANT
Payer: COMMERCIAL

## 2021-07-26 ENCOUNTER — OFFICE VISIT (OUTPATIENT)
Dept: SPORTS MEDICINE | Facility: CLINIC | Age: 46
End: 2021-07-26
Payer: COMMERCIAL

## 2021-07-26 VITALS
SYSTOLIC BLOOD PRESSURE: 101 MMHG | BODY MASS INDEX: 36.58 KG/M2 | WEIGHT: 247 LBS | HEIGHT: 69 IN | HEART RATE: 57 BPM | DIASTOLIC BLOOD PRESSURE: 63 MMHG

## 2021-07-26 DIAGNOSIS — M94.261 CHONDROMALACIA OF RIGHT KNEE: ICD-10-CM

## 2021-07-26 DIAGNOSIS — M25.561 ACUTE PAIN OF RIGHT KNEE: Primary | ICD-10-CM

## 2021-07-26 DIAGNOSIS — S83.206A TEAR OF MENISCUS OF RIGHT KNEE, UNSPECIFIED MENISCUS, UNSPECIFIED TEAR TYPE, UNSPECIFIED WHETHER OLD OR CURRENT TEAR: ICD-10-CM

## 2021-07-26 PROCEDURE — 73564 X-RAY EXAM KNEE 4 OR MORE: CPT | Mod: 26,50,, | Performed by: RADIOLOGY

## 2021-07-26 PROCEDURE — 99204 OFFICE O/P NEW MOD 45 MIN: CPT | Mod: S$GLB,,, | Performed by: PHYSICIAN ASSISTANT

## 2021-07-26 PROCEDURE — 1160F RVW MEDS BY RX/DR IN RCRD: CPT | Mod: CPTII,S$GLB,, | Performed by: PHYSICIAN ASSISTANT

## 2021-07-26 PROCEDURE — 99999 PR PBB SHADOW E&M-EST. PATIENT-LVL IV: ICD-10-PCS | Mod: PBBFAC,,, | Performed by: PHYSICIAN ASSISTANT

## 2021-07-26 PROCEDURE — 73564 X-RAY EXAM KNEE 4 OR MORE: CPT | Mod: TC,50

## 2021-07-26 PROCEDURE — 99999 PR PBB SHADOW E&M-EST. PATIENT-LVL IV: CPT | Mod: PBBFAC,,, | Performed by: PHYSICIAN ASSISTANT

## 2021-07-26 PROCEDURE — 1159F PR MEDICATION LIST DOCUMENTED IN MEDICAL RECORD: ICD-10-PCS | Mod: CPTII,S$GLB,, | Performed by: PHYSICIAN ASSISTANT

## 2021-07-26 PROCEDURE — 73564 XR KNEE ORTHO BILAT WITH FLEXION: ICD-10-PCS | Mod: 26,50,, | Performed by: RADIOLOGY

## 2021-07-26 PROCEDURE — 1125F AMNT PAIN NOTED PAIN PRSNT: CPT | Mod: CPTII,S$GLB,, | Performed by: PHYSICIAN ASSISTANT

## 2021-07-26 PROCEDURE — 3008F PR BODY MASS INDEX (BMI) DOCUMENTED: ICD-10-PCS | Mod: CPTII,S$GLB,, | Performed by: PHYSICIAN ASSISTANT

## 2021-07-26 PROCEDURE — 1159F MED LIST DOCD IN RCRD: CPT | Mod: CPTII,S$GLB,, | Performed by: PHYSICIAN ASSISTANT

## 2021-07-26 PROCEDURE — 3008F BODY MASS INDEX DOCD: CPT | Mod: CPTII,S$GLB,, | Performed by: PHYSICIAN ASSISTANT

## 2021-07-26 PROCEDURE — 1125F PR PAIN SEVERITY QUANTIFIED, PAIN PRESENT: ICD-10-PCS | Mod: CPTII,S$GLB,, | Performed by: PHYSICIAN ASSISTANT

## 2021-07-26 PROCEDURE — 99204 PR OFFICE/OUTPT VISIT, NEW, LEVL IV, 45-59 MIN: ICD-10-PCS | Mod: S$GLB,,, | Performed by: PHYSICIAN ASSISTANT

## 2021-07-26 PROCEDURE — 1160F PR REVIEW ALL MEDS BY PRESCRIBER/CLIN PHARMACIST DOCUMENTED: ICD-10-PCS | Mod: CPTII,S$GLB,, | Performed by: PHYSICIAN ASSISTANT

## 2021-08-03 ENCOUNTER — TELEPHONE (OUTPATIENT)
Dept: NEUROSURGERY | Facility: CLINIC | Age: 46
End: 2021-08-03

## 2021-08-03 ENCOUNTER — OFFICE VISIT (OUTPATIENT)
Dept: NEUROSURGERY | Facility: CLINIC | Age: 46
End: 2021-08-03
Payer: COMMERCIAL

## 2021-08-03 ENCOUNTER — PATIENT OUTREACH (OUTPATIENT)
Dept: ADMINISTRATIVE | Facility: OTHER | Age: 46
End: 2021-08-03

## 2021-08-03 ENCOUNTER — TELEPHONE (OUTPATIENT)
Dept: SPORTS MEDICINE | Facility: CLINIC | Age: 46
End: 2021-08-03

## 2021-08-03 VITALS — HEART RATE: 58 BPM | SYSTOLIC BLOOD PRESSURE: 113 MMHG | DIASTOLIC BLOOD PRESSURE: 70 MMHG

## 2021-08-03 DIAGNOSIS — R22.9 LOCALIZED SOFT TISSUE SWELLING: ICD-10-CM

## 2021-08-03 DIAGNOSIS — M54.2 CHRONIC NECK PAIN: Primary | ICD-10-CM

## 2021-08-03 DIAGNOSIS — M48.02 CERVICAL STENOSIS OF SPINAL CANAL: ICD-10-CM

## 2021-08-03 DIAGNOSIS — M48.02 SPINAL STENOSIS, CERVICAL REGION: ICD-10-CM

## 2021-08-03 DIAGNOSIS — G89.29 CHRONIC NECK PAIN: Primary | ICD-10-CM

## 2021-08-03 PROCEDURE — 1125F PR PAIN SEVERITY QUANTIFIED, PAIN PRESENT: ICD-10-PCS | Mod: CPTII,S$GLB,, | Performed by: NURSE PRACTITIONER

## 2021-08-03 PROCEDURE — 99999 PR PBB SHADOW E&M-EST. PATIENT-LVL IV: ICD-10-PCS | Mod: PBBFAC,,, | Performed by: NURSE PRACTITIONER

## 2021-08-03 PROCEDURE — 1125F AMNT PAIN NOTED PAIN PRSNT: CPT | Mod: CPTII,S$GLB,, | Performed by: NURSE PRACTITIONER

## 2021-08-03 PROCEDURE — 99999 PR PBB SHADOW E&M-EST. PATIENT-LVL IV: CPT | Mod: PBBFAC,,, | Performed by: NURSE PRACTITIONER

## 2021-08-03 PROCEDURE — 1159F MED LIST DOCD IN RCRD: CPT | Mod: CPTII,S$GLB,, | Performed by: NURSE PRACTITIONER

## 2021-08-03 PROCEDURE — 3078F PR MOST RECENT DIASTOLIC BLOOD PRESSURE < 80 MM HG: ICD-10-PCS | Mod: CPTII,S$GLB,, | Performed by: NURSE PRACTITIONER

## 2021-08-03 PROCEDURE — 99205 OFFICE O/P NEW HI 60 MIN: CPT | Mod: S$GLB,,, | Performed by: NURSE PRACTITIONER

## 2021-08-03 PROCEDURE — 3074F SYST BP LT 130 MM HG: CPT | Mod: CPTII,S$GLB,, | Performed by: NURSE PRACTITIONER

## 2021-08-03 PROCEDURE — 1159F PR MEDICATION LIST DOCUMENTED IN MEDICAL RECORD: ICD-10-PCS | Mod: CPTII,S$GLB,, | Performed by: NURSE PRACTITIONER

## 2021-08-03 PROCEDURE — 99205 PR OFFICE/OUTPT VISIT, NEW, LEVL V, 60-74 MIN: ICD-10-PCS | Mod: S$GLB,,, | Performed by: NURSE PRACTITIONER

## 2021-08-03 PROCEDURE — 3078F DIAST BP <80 MM HG: CPT | Mod: CPTII,S$GLB,, | Performed by: NURSE PRACTITIONER

## 2021-08-03 PROCEDURE — 3074F PR MOST RECENT SYSTOLIC BLOOD PRESSURE < 130 MM HG: ICD-10-PCS | Mod: CPTII,S$GLB,, | Performed by: NURSE PRACTITIONER

## 2021-08-03 RX ORDER — METHYLPREDNISOLONE 4 MG/1
TABLET ORAL
Qty: 1 PACKAGE | Refills: 0 | Status: SHIPPED | OUTPATIENT
Start: 2021-08-03 | End: 2021-08-24

## 2021-08-03 RX ORDER — METHOCARBAMOL 750 MG/1
750 TABLET, FILM COATED ORAL EVERY 8 HOURS PRN
Qty: 30 TABLET | Refills: 0 | Status: SHIPPED | OUTPATIENT
Start: 2021-08-03 | End: 2021-11-30

## 2021-08-04 ENCOUNTER — PATIENT MESSAGE (OUTPATIENT)
Dept: NEUROSURGERY | Facility: CLINIC | Age: 46
End: 2021-08-04

## 2021-08-04 ENCOUNTER — TELEPHONE (OUTPATIENT)
Dept: NEUROSURGERY | Facility: CLINIC | Age: 46
End: 2021-08-04

## 2021-08-06 ENCOUNTER — OFFICE VISIT (OUTPATIENT)
Dept: SPORTS MEDICINE | Facility: CLINIC | Age: 46
End: 2021-08-06
Payer: COMMERCIAL

## 2021-08-06 ENCOUNTER — TELEPHONE (OUTPATIENT)
Dept: NEUROSURGERY | Facility: CLINIC | Age: 46
End: 2021-08-06

## 2021-08-06 DIAGNOSIS — M94.261 CHONDROMALACIA OF RIGHT KNEE: ICD-10-CM

## 2021-08-06 DIAGNOSIS — S83.411A SPRAIN OF MEDIAL COLLATERAL LIGAMENT OF RIGHT KNEE, INITIAL ENCOUNTER: ICD-10-CM

## 2021-08-06 DIAGNOSIS — M22.41 CHONDROMALACIA PATELLAE, RIGHT KNEE: ICD-10-CM

## 2021-08-06 DIAGNOSIS — M17.11 PRIMARY OSTEOARTHRITIS OF RIGHT KNEE: Primary | ICD-10-CM

## 2021-08-06 PROCEDURE — 1160F PR REVIEW ALL MEDS BY PRESCRIBER/CLIN PHARMACIST DOCUMENTED: ICD-10-PCS | Mod: CPTII,95,, | Performed by: PHYSICIAN ASSISTANT

## 2021-08-06 PROCEDURE — 99213 PR OFFICE/OUTPT VISIT, EST, LEVL III, 20-29 MIN: ICD-10-PCS | Mod: 95,,, | Performed by: PHYSICIAN ASSISTANT

## 2021-08-06 PROCEDURE — 1160F RVW MEDS BY RX/DR IN RCRD: CPT | Mod: CPTII,95,, | Performed by: PHYSICIAN ASSISTANT

## 2021-08-06 PROCEDURE — 1159F PR MEDICATION LIST DOCUMENTED IN MEDICAL RECORD: ICD-10-PCS | Mod: CPTII,95,, | Performed by: PHYSICIAN ASSISTANT

## 2021-08-06 PROCEDURE — 99213 OFFICE O/P EST LOW 20 MIN: CPT | Mod: 95,,, | Performed by: PHYSICIAN ASSISTANT

## 2021-08-06 PROCEDURE — 1159F MED LIST DOCD IN RCRD: CPT | Mod: CPTII,95,, | Performed by: PHYSICIAN ASSISTANT

## 2021-08-17 ENCOUNTER — CLINICAL SUPPORT (OUTPATIENT)
Dept: REHABILITATION | Facility: HOSPITAL | Age: 46
End: 2021-08-17
Payer: COMMERCIAL

## 2021-08-17 DIAGNOSIS — M17.11 PRIMARY OSTEOARTHRITIS OF RIGHT KNEE: ICD-10-CM

## 2021-08-17 DIAGNOSIS — M94.261 CHONDROMALACIA OF RIGHT KNEE: ICD-10-CM

## 2021-08-17 DIAGNOSIS — M43.6 NECK STIFFNESS: ICD-10-CM

## 2021-08-17 DIAGNOSIS — R29.898 SHOULDER WEAKNESS: ICD-10-CM

## 2021-08-17 DIAGNOSIS — S83.411A SPRAIN OF MEDIAL COLLATERAL LIGAMENT OF RIGHT KNEE, INITIAL ENCOUNTER: ICD-10-CM

## 2021-08-17 PROCEDURE — 97162 PT EVAL MOD COMPLEX 30 MIN: CPT | Mod: PO

## 2021-08-17 PROCEDURE — 97110 THERAPEUTIC EXERCISES: CPT | Mod: PO

## 2021-08-23 ENCOUNTER — CLINICAL SUPPORT (OUTPATIENT)
Dept: REHABILITATION | Facility: HOSPITAL | Age: 46
End: 2021-08-23
Payer: COMMERCIAL

## 2021-08-23 DIAGNOSIS — M43.6 NECK STIFFNESS: ICD-10-CM

## 2021-08-23 DIAGNOSIS — R29.898 SHOULDER WEAKNESS: ICD-10-CM

## 2021-08-23 PROCEDURE — 97112 NEUROMUSCULAR REEDUCATION: CPT | Mod: PO

## 2021-08-23 PROCEDURE — 97140 MANUAL THERAPY 1/> REGIONS: CPT | Mod: PO

## 2021-08-23 PROCEDURE — 97110 THERAPEUTIC EXERCISES: CPT | Mod: PO

## 2021-08-25 ENCOUNTER — CLINICAL SUPPORT (OUTPATIENT)
Dept: REHABILITATION | Facility: HOSPITAL | Age: 46
End: 2021-08-25
Payer: COMMERCIAL

## 2021-08-25 DIAGNOSIS — M43.6 NECK STIFFNESS: ICD-10-CM

## 2021-08-25 DIAGNOSIS — R29.898 SHOULDER WEAKNESS: ICD-10-CM

## 2021-08-25 PROCEDURE — 97110 THERAPEUTIC EXERCISES: CPT | Mod: PO,CQ

## 2021-08-25 PROCEDURE — 97140 MANUAL THERAPY 1/> REGIONS: CPT | Mod: PO,CQ

## 2021-08-27 ENCOUNTER — OFFICE VISIT (OUTPATIENT)
Dept: SPORTS MEDICINE | Facility: CLINIC | Age: 46
End: 2021-08-27
Payer: COMMERCIAL

## 2021-08-27 VITALS
SYSTOLIC BLOOD PRESSURE: 115 MMHG | HEART RATE: 57 BPM | WEIGHT: 245.31 LBS | TEMPERATURE: 98 F | DIASTOLIC BLOOD PRESSURE: 67 MMHG | HEIGHT: 69 IN | BODY MASS INDEX: 36.33 KG/M2

## 2021-08-27 DIAGNOSIS — M17.11 PRIMARY OSTEOARTHRITIS OF RIGHT KNEE: Primary | ICD-10-CM

## 2021-08-27 PROCEDURE — 1125F AMNT PAIN NOTED PAIN PRSNT: CPT | Mod: CPTII,S$GLB,, | Performed by: PHYSICIAN ASSISTANT

## 2021-08-27 PROCEDURE — 99499 UNLISTED E&M SERVICE: CPT | Mod: S$GLB,,, | Performed by: PHYSICIAN ASSISTANT

## 2021-08-27 PROCEDURE — 99999 PR PBB SHADOW E&M-EST. PATIENT-LVL IV: ICD-10-PCS | Mod: PBBFAC,,, | Performed by: PHYSICIAN ASSISTANT

## 2021-08-27 PROCEDURE — 1159F PR MEDICATION LIST DOCUMENTED IN MEDICAL RECORD: ICD-10-PCS | Mod: CPTII,S$GLB,, | Performed by: PHYSICIAN ASSISTANT

## 2021-08-27 PROCEDURE — 1160F RVW MEDS BY RX/DR IN RCRD: CPT | Mod: CPTII,S$GLB,, | Performed by: PHYSICIAN ASSISTANT

## 2021-08-27 PROCEDURE — 1160F PR REVIEW ALL MEDS BY PRESCRIBER/CLIN PHARMACIST DOCUMENTED: ICD-10-PCS | Mod: CPTII,S$GLB,, | Performed by: PHYSICIAN ASSISTANT

## 2021-08-27 PROCEDURE — 99499 NO LOS: ICD-10-PCS | Mod: S$GLB,,, | Performed by: PHYSICIAN ASSISTANT

## 2021-08-27 PROCEDURE — 1159F MED LIST DOCD IN RCRD: CPT | Mod: CPTII,S$GLB,, | Performed by: PHYSICIAN ASSISTANT

## 2021-08-27 PROCEDURE — 20610 PR DRAIN/INJECT LARGE JOINT/BURSA: ICD-10-PCS | Mod: RT,S$GLB,, | Performed by: PHYSICIAN ASSISTANT

## 2021-08-27 PROCEDURE — 99999 PR PBB SHADOW E&M-EST. PATIENT-LVL IV: CPT | Mod: PBBFAC,,, | Performed by: PHYSICIAN ASSISTANT

## 2021-08-27 PROCEDURE — 3074F SYST BP LT 130 MM HG: CPT | Mod: CPTII,S$GLB,, | Performed by: PHYSICIAN ASSISTANT

## 2021-08-27 PROCEDURE — 3074F PR MOST RECENT SYSTOLIC BLOOD PRESSURE < 130 MM HG: ICD-10-PCS | Mod: CPTII,S$GLB,, | Performed by: PHYSICIAN ASSISTANT

## 2021-08-27 PROCEDURE — 1125F PR PAIN SEVERITY QUANTIFIED, PAIN PRESENT: ICD-10-PCS | Mod: CPTII,S$GLB,, | Performed by: PHYSICIAN ASSISTANT

## 2021-08-27 PROCEDURE — 3078F PR MOST RECENT DIASTOLIC BLOOD PRESSURE < 80 MM HG: ICD-10-PCS | Mod: CPTII,S$GLB,, | Performed by: PHYSICIAN ASSISTANT

## 2021-08-27 PROCEDURE — 3078F DIAST BP <80 MM HG: CPT | Mod: CPTII,S$GLB,, | Performed by: PHYSICIAN ASSISTANT

## 2021-08-27 PROCEDURE — 3008F PR BODY MASS INDEX (BMI) DOCUMENTED: ICD-10-PCS | Mod: CPTII,S$GLB,, | Performed by: PHYSICIAN ASSISTANT

## 2021-08-27 PROCEDURE — 3008F BODY MASS INDEX DOCD: CPT | Mod: CPTII,S$GLB,, | Performed by: PHYSICIAN ASSISTANT

## 2021-08-27 PROCEDURE — 20610 DRAIN/INJ JOINT/BURSA W/O US: CPT | Mod: RT,S$GLB,, | Performed by: PHYSICIAN ASSISTANT

## 2021-09-01 ENCOUNTER — TELEPHONE (OUTPATIENT)
Dept: SPORTS MEDICINE | Facility: CLINIC | Age: 46
End: 2021-09-01

## 2021-09-07 ENCOUNTER — CLINICAL SUPPORT (OUTPATIENT)
Dept: REHABILITATION | Facility: HOSPITAL | Age: 46
End: 2021-09-07
Payer: COMMERCIAL

## 2021-09-07 DIAGNOSIS — M25.661 STIFFNESS OF RIGHT KNEE: ICD-10-CM

## 2021-09-07 DIAGNOSIS — M43.6 NECK STIFFNESS: ICD-10-CM

## 2021-09-07 DIAGNOSIS — R29.898 SHOULDER WEAKNESS: ICD-10-CM

## 2021-09-07 PROBLEM — M25.669 KNEE STIFFNESS: Status: ACTIVE | Noted: 2021-09-07

## 2021-09-07 PROCEDURE — 97112 NEUROMUSCULAR REEDUCATION: CPT | Mod: PO

## 2021-09-07 PROCEDURE — 97110 THERAPEUTIC EXERCISES: CPT | Mod: PO

## 2021-09-15 ENCOUNTER — CLINICAL SUPPORT (OUTPATIENT)
Dept: REHABILITATION | Facility: HOSPITAL | Age: 46
End: 2021-09-15
Payer: COMMERCIAL

## 2021-09-15 DIAGNOSIS — R29.898 SHOULDER WEAKNESS: ICD-10-CM

## 2021-09-15 DIAGNOSIS — M43.6 NECK STIFFNESS: ICD-10-CM

## 2021-09-15 DIAGNOSIS — M25.661 STIFFNESS OF RIGHT KNEE: ICD-10-CM

## 2021-09-15 PROCEDURE — 97110 THERAPEUTIC EXERCISES: CPT | Mod: PO,CQ

## 2021-09-15 PROCEDURE — 97140 MANUAL THERAPY 1/> REGIONS: CPT | Mod: PO,CQ

## 2021-09-15 PROCEDURE — 97112 NEUROMUSCULAR REEDUCATION: CPT | Mod: PO,CQ

## 2021-09-21 ENCOUNTER — CLINICAL SUPPORT (OUTPATIENT)
Dept: REHABILITATION | Facility: HOSPITAL | Age: 46
End: 2021-09-21
Payer: COMMERCIAL

## 2021-09-21 DIAGNOSIS — M25.661 STIFFNESS OF RIGHT KNEE: ICD-10-CM

## 2021-09-21 DIAGNOSIS — R29.898 SHOULDER WEAKNESS: ICD-10-CM

## 2021-09-21 DIAGNOSIS — M43.6 NECK STIFFNESS: ICD-10-CM

## 2021-09-21 PROCEDURE — 97112 NEUROMUSCULAR REEDUCATION: CPT | Mod: PO

## 2021-09-21 PROCEDURE — 97140 MANUAL THERAPY 1/> REGIONS: CPT | Mod: PO

## 2021-09-21 PROCEDURE — 97110 THERAPEUTIC EXERCISES: CPT | Mod: PO

## 2021-09-24 ENCOUNTER — CLINICAL SUPPORT (OUTPATIENT)
Dept: REHABILITATION | Facility: HOSPITAL | Age: 46
End: 2021-09-24
Payer: COMMERCIAL

## 2021-09-24 DIAGNOSIS — M25.661 STIFFNESS OF RIGHT KNEE: ICD-10-CM

## 2021-09-24 DIAGNOSIS — M43.6 NECK STIFFNESS: ICD-10-CM

## 2021-09-24 DIAGNOSIS — R29.898 SHOULDER WEAKNESS: ICD-10-CM

## 2021-09-24 PROCEDURE — 97110 THERAPEUTIC EXERCISES: CPT | Mod: PO,CQ

## 2021-09-29 ENCOUNTER — CLINICAL SUPPORT (OUTPATIENT)
Dept: REHABILITATION | Facility: HOSPITAL | Age: 46
End: 2021-09-29
Payer: COMMERCIAL

## 2021-09-29 DIAGNOSIS — M25.661 STIFFNESS OF RIGHT KNEE: ICD-10-CM

## 2021-09-29 DIAGNOSIS — M43.6 NECK STIFFNESS: ICD-10-CM

## 2021-09-29 DIAGNOSIS — R29.898 SHOULDER WEAKNESS: ICD-10-CM

## 2021-09-29 PROCEDURE — 97112 NEUROMUSCULAR REEDUCATION: CPT | Mod: PO,CQ

## 2021-09-29 PROCEDURE — 97140 MANUAL THERAPY 1/> REGIONS: CPT | Mod: PO,CQ

## 2021-09-29 PROCEDURE — 97110 THERAPEUTIC EXERCISES: CPT | Mod: PO,CQ

## 2021-10-07 ENCOUNTER — CLINICAL SUPPORT (OUTPATIENT)
Dept: REHABILITATION | Facility: HOSPITAL | Age: 46
End: 2021-10-07
Payer: COMMERCIAL

## 2021-10-07 DIAGNOSIS — M25.661 STIFFNESS OF RIGHT KNEE: ICD-10-CM

## 2021-10-07 DIAGNOSIS — R29.898 SHOULDER WEAKNESS: ICD-10-CM

## 2021-10-07 DIAGNOSIS — M43.6 NECK STIFFNESS: ICD-10-CM

## 2021-10-07 PROCEDURE — 97110 THERAPEUTIC EXERCISES: CPT | Mod: PO

## 2021-10-07 PROCEDURE — 97140 MANUAL THERAPY 1/> REGIONS: CPT | Mod: PO

## 2021-10-07 PROCEDURE — 97112 NEUROMUSCULAR REEDUCATION: CPT | Mod: PO

## 2021-10-12 ENCOUNTER — CLINICAL SUPPORT (OUTPATIENT)
Dept: REHABILITATION | Facility: HOSPITAL | Age: 46
End: 2021-10-12
Payer: COMMERCIAL

## 2021-10-12 DIAGNOSIS — M25.661 STIFFNESS OF RIGHT KNEE: ICD-10-CM

## 2021-10-12 DIAGNOSIS — M43.6 NECK STIFFNESS: ICD-10-CM

## 2021-10-12 DIAGNOSIS — R29.898 SHOULDER WEAKNESS: ICD-10-CM

## 2021-10-12 PROCEDURE — 97110 THERAPEUTIC EXERCISES: CPT | Mod: PO

## 2021-10-12 PROCEDURE — 97140 MANUAL THERAPY 1/> REGIONS: CPT | Mod: PO

## 2021-10-12 PROCEDURE — 97112 NEUROMUSCULAR REEDUCATION: CPT | Mod: PO

## 2021-10-14 ENCOUNTER — CLINICAL SUPPORT (OUTPATIENT)
Dept: REHABILITATION | Facility: HOSPITAL | Age: 46
End: 2021-10-14
Payer: COMMERCIAL

## 2021-10-14 DIAGNOSIS — M25.661 STIFFNESS OF RIGHT KNEE: ICD-10-CM

## 2021-10-14 DIAGNOSIS — M43.6 NECK STIFFNESS: ICD-10-CM

## 2021-10-14 DIAGNOSIS — R29.898 SHOULDER WEAKNESS: ICD-10-CM

## 2021-10-14 PROCEDURE — 97112 NEUROMUSCULAR REEDUCATION: CPT | Mod: PO,CQ

## 2021-10-14 PROCEDURE — 97140 MANUAL THERAPY 1/> REGIONS: CPT | Mod: PO,CQ

## 2021-10-14 PROCEDURE — 97110 THERAPEUTIC EXERCISES: CPT | Mod: PO,CQ

## 2021-10-19 ENCOUNTER — PATIENT OUTREACH (OUTPATIENT)
Dept: ADMINISTRATIVE | Facility: OTHER | Age: 46
End: 2021-10-19

## 2021-10-21 ENCOUNTER — CLINICAL SUPPORT (OUTPATIENT)
Dept: REHABILITATION | Facility: HOSPITAL | Age: 46
End: 2021-10-21
Payer: COMMERCIAL

## 2021-10-21 DIAGNOSIS — R29.898 SHOULDER WEAKNESS: ICD-10-CM

## 2021-10-21 DIAGNOSIS — M25.661 STIFFNESS OF RIGHT KNEE: ICD-10-CM

## 2021-10-21 DIAGNOSIS — M43.6 NECK STIFFNESS: ICD-10-CM

## 2021-10-21 PROCEDURE — 97112 NEUROMUSCULAR REEDUCATION: CPT | Mod: PO

## 2021-10-21 PROCEDURE — 97110 THERAPEUTIC EXERCISES: CPT | Mod: PO

## 2021-10-21 PROCEDURE — 97140 MANUAL THERAPY 1/> REGIONS: CPT | Mod: PO

## 2021-10-22 DIAGNOSIS — M25.512 LEFT SHOULDER PAIN, UNSPECIFIED CHRONICITY: Primary | ICD-10-CM

## 2021-10-25 ENCOUNTER — HOSPITAL ENCOUNTER (OUTPATIENT)
Dept: RADIOLOGY | Facility: HOSPITAL | Age: 46
Discharge: HOME | End: 2021-10-25
Attending: ORTHOPAEDIC SURGERY
Payer: COMMERCIAL

## 2021-10-25 ENCOUNTER — OFFICE VISIT (OUTPATIENT)
Dept: ORTHOPEDICS | Facility: CLINIC | Age: 46
End: 2021-10-25
Payer: COMMERCIAL

## 2021-10-25 VITALS — WEIGHT: 245.38 LBS | HEIGHT: 69 IN | BODY MASS INDEX: 36.34 KG/M2

## 2021-10-25 DIAGNOSIS — M25.512 LEFT SHOULDER PAIN, UNSPECIFIED CHRONICITY: ICD-10-CM

## 2021-10-25 DIAGNOSIS — M25.512 LEFT SHOULDER PAIN, UNSPECIFIED CHRONICITY: Primary | ICD-10-CM

## 2021-10-25 PROCEDURE — 99999 PR PBB SHADOW E&M-EST. PATIENT-LVL III: CPT | Mod: PBBFAC,,, | Performed by: ORTHOPAEDIC SURGERY

## 2021-10-25 PROCEDURE — 1160F RVW MEDS BY RX/DR IN RCRD: CPT | Mod: CPTII,S$GLB,, | Performed by: ORTHOPAEDIC SURGERY

## 2021-10-25 PROCEDURE — 3008F PR BODY MASS INDEX (BMI) DOCUMENTED: ICD-10-PCS | Mod: CPTII,S$GLB,, | Performed by: ORTHOPAEDIC SURGERY

## 2021-10-25 PROCEDURE — 1160F PR REVIEW ALL MEDS BY PRESCRIBER/CLIN PHARMACIST DOCUMENTED: ICD-10-PCS | Mod: CPTII,S$GLB,, | Performed by: ORTHOPAEDIC SURGERY

## 2021-10-25 PROCEDURE — 99999 PR PBB SHADOW E&M-EST. PATIENT-LVL III: ICD-10-PCS | Mod: PBBFAC,,, | Performed by: ORTHOPAEDIC SURGERY

## 2021-10-25 PROCEDURE — 3008F BODY MASS INDEX DOCD: CPT | Mod: CPTII,S$GLB,, | Performed by: ORTHOPAEDIC SURGERY

## 2021-10-25 PROCEDURE — 73030 X-RAY EXAM OF SHOULDER: CPT | Mod: TC,PO,LT

## 2021-10-25 PROCEDURE — 73030 XR SHOULDER TRAUMA 3 VIEW LEFT: ICD-10-PCS | Mod: 26,LT,, | Performed by: RADIOLOGY

## 2021-10-25 PROCEDURE — 1159F PR MEDICATION LIST DOCUMENTED IN MEDICAL RECORD: ICD-10-PCS | Mod: CPTII,S$GLB,, | Performed by: ORTHOPAEDIC SURGERY

## 2021-10-25 PROCEDURE — 99213 PR OFFICE/OUTPT VISIT, EST, LEVL III, 20-29 MIN: ICD-10-PCS | Mod: S$GLB,,, | Performed by: ORTHOPAEDIC SURGERY

## 2021-10-25 PROCEDURE — 73030 X-RAY EXAM OF SHOULDER: CPT | Mod: 26,LT,, | Performed by: RADIOLOGY

## 2021-10-25 PROCEDURE — 4010F ACE/ARB THERAPY RXD/TAKEN: CPT | Mod: CPTII,S$GLB,, | Performed by: ORTHOPAEDIC SURGERY

## 2021-10-25 PROCEDURE — 1159F MED LIST DOCD IN RCRD: CPT | Mod: CPTII,S$GLB,, | Performed by: ORTHOPAEDIC SURGERY

## 2021-10-25 PROCEDURE — 4010F PR ACE/ARB THEARPY RXD/TAKEN: ICD-10-PCS | Mod: CPTII,S$GLB,, | Performed by: ORTHOPAEDIC SURGERY

## 2021-10-25 PROCEDURE — 99213 OFFICE O/P EST LOW 20 MIN: CPT | Mod: S$GLB,,, | Performed by: ORTHOPAEDIC SURGERY

## 2021-10-27 ENCOUNTER — CLINICAL SUPPORT (OUTPATIENT)
Dept: REHABILITATION | Facility: HOSPITAL | Age: 46
End: 2021-10-27
Payer: COMMERCIAL

## 2021-10-27 DIAGNOSIS — M43.6 NECK STIFFNESS: ICD-10-CM

## 2021-10-27 DIAGNOSIS — M25.661 STIFFNESS OF RIGHT KNEE: ICD-10-CM

## 2021-10-27 DIAGNOSIS — M25.512 LEFT SHOULDER PAIN, UNSPECIFIED CHRONICITY: ICD-10-CM

## 2021-10-27 DIAGNOSIS — R29.898 SHOULDER WEAKNESS: ICD-10-CM

## 2021-10-27 PROCEDURE — 97140 MANUAL THERAPY 1/> REGIONS: CPT | Mod: PO,CQ

## 2021-10-27 PROCEDURE — 97110 THERAPEUTIC EXERCISES: CPT | Mod: PO,CQ

## 2021-10-27 PROCEDURE — 97112 NEUROMUSCULAR REEDUCATION: CPT | Mod: PO,CQ

## 2021-10-29 ENCOUNTER — CLINICAL SUPPORT (OUTPATIENT)
Dept: REHABILITATION | Facility: HOSPITAL | Age: 46
End: 2021-10-29
Payer: COMMERCIAL

## 2021-10-29 DIAGNOSIS — R29.898 SHOULDER WEAKNESS: ICD-10-CM

## 2021-10-29 DIAGNOSIS — M43.6 NECK STIFFNESS: ICD-10-CM

## 2021-10-29 DIAGNOSIS — M25.661 STIFFNESS OF RIGHT KNEE: ICD-10-CM

## 2021-10-29 PROCEDURE — 97112 NEUROMUSCULAR REEDUCATION: CPT | Mod: PO,CQ

## 2021-10-29 PROCEDURE — 97110 THERAPEUTIC EXERCISES: CPT | Mod: PO,CQ

## 2021-11-01 ENCOUNTER — CLINICAL SUPPORT (OUTPATIENT)
Dept: REHABILITATION | Facility: HOSPITAL | Age: 46
End: 2021-11-01
Payer: COMMERCIAL

## 2021-11-01 DIAGNOSIS — M43.6 NECK STIFFNESS: ICD-10-CM

## 2021-11-01 DIAGNOSIS — R29.898 SHOULDER WEAKNESS: ICD-10-CM

## 2021-11-01 DIAGNOSIS — M25.661 STIFFNESS OF RIGHT KNEE: ICD-10-CM

## 2021-11-01 PROCEDURE — 97110 THERAPEUTIC EXERCISES: CPT | Mod: PO

## 2021-11-01 PROCEDURE — 97140 MANUAL THERAPY 1/> REGIONS: CPT | Mod: PO

## 2021-11-01 PROCEDURE — 97112 NEUROMUSCULAR REEDUCATION: CPT | Mod: PO

## 2021-11-03 ENCOUNTER — CLINICAL SUPPORT (OUTPATIENT)
Dept: REHABILITATION | Facility: HOSPITAL | Age: 46
End: 2021-11-03
Payer: COMMERCIAL

## 2021-11-03 DIAGNOSIS — M43.6 NECK STIFFNESS: Primary | ICD-10-CM

## 2021-11-03 DIAGNOSIS — R29.898 SHOULDER WEAKNESS: ICD-10-CM

## 2021-11-03 PROCEDURE — 97110 THERAPEUTIC EXERCISES: CPT | Mod: PO,CQ

## 2021-11-16 ENCOUNTER — CLINICAL SUPPORT (OUTPATIENT)
Dept: REHABILITATION | Facility: HOSPITAL | Age: 46
End: 2021-11-16
Payer: COMMERCIAL

## 2021-11-16 DIAGNOSIS — R29.898 SHOULDER WEAKNESS: ICD-10-CM

## 2021-11-16 DIAGNOSIS — M43.6 NECK STIFFNESS: ICD-10-CM

## 2021-11-16 DIAGNOSIS — M25.661 STIFFNESS OF RIGHT KNEE: ICD-10-CM

## 2021-11-16 PROCEDURE — 97140 MANUAL THERAPY 1/> REGIONS: CPT | Mod: PO

## 2021-11-16 PROCEDURE — 97110 THERAPEUTIC EXERCISES: CPT | Mod: PO

## 2021-11-18 ENCOUNTER — CLINICAL SUPPORT (OUTPATIENT)
Dept: REHABILITATION | Facility: HOSPITAL | Age: 46
End: 2021-11-18
Payer: COMMERCIAL

## 2021-11-18 ENCOUNTER — TELEPHONE (OUTPATIENT)
Dept: SPORTS MEDICINE | Facility: CLINIC | Age: 46
End: 2021-11-18
Payer: COMMERCIAL

## 2021-11-18 DIAGNOSIS — M43.6 NECK STIFFNESS: ICD-10-CM

## 2021-11-18 DIAGNOSIS — R29.898 SHOULDER WEAKNESS: ICD-10-CM

## 2021-11-18 DIAGNOSIS — M25.661 STIFFNESS OF RIGHT KNEE: ICD-10-CM

## 2021-11-18 PROCEDURE — 97140 MANUAL THERAPY 1/> REGIONS: CPT | Mod: PO,CQ

## 2021-11-18 PROCEDURE — 97110 THERAPEUTIC EXERCISES: CPT | Mod: PO,CQ

## 2021-11-23 ENCOUNTER — CLINICAL SUPPORT (OUTPATIENT)
Dept: REHABILITATION | Facility: HOSPITAL | Age: 46
End: 2021-11-23
Payer: COMMERCIAL

## 2021-11-23 DIAGNOSIS — M43.6 NECK STIFFNESS: ICD-10-CM

## 2021-11-23 DIAGNOSIS — M25.661 STIFFNESS OF RIGHT KNEE: ICD-10-CM

## 2021-11-23 DIAGNOSIS — R29.898 SHOULDER WEAKNESS: ICD-10-CM

## 2021-11-23 PROCEDURE — 97110 THERAPEUTIC EXERCISES: CPT | Mod: PO,CQ

## 2021-11-23 PROCEDURE — 97140 MANUAL THERAPY 1/> REGIONS: CPT | Mod: PO,CQ

## 2021-11-30 ENCOUNTER — LAB VISIT (OUTPATIENT)
Dept: LAB | Facility: HOSPITAL | Age: 46
End: 2021-11-30
Attending: INTERNAL MEDICINE
Payer: COMMERCIAL

## 2021-11-30 ENCOUNTER — OFFICE VISIT (OUTPATIENT)
Dept: FAMILY MEDICINE | Facility: CLINIC | Age: 46
End: 2021-11-30
Payer: COMMERCIAL

## 2021-11-30 VITALS
OXYGEN SATURATION: 98 % | BODY MASS INDEX: 37.88 KG/M2 | WEIGHT: 255.75 LBS | HEART RATE: 60 BPM | HEIGHT: 69 IN | DIASTOLIC BLOOD PRESSURE: 78 MMHG | SYSTOLIC BLOOD PRESSURE: 118 MMHG

## 2021-11-30 DIAGNOSIS — E55.9 HYPOVITAMINOSIS D: ICD-10-CM

## 2021-11-30 DIAGNOSIS — E06.3 HYPOTHYROIDISM DUE TO HASHIMOTO'S THYROIDITIS: Primary | ICD-10-CM

## 2021-11-30 DIAGNOSIS — E78.49 OTHER HYPERLIPIDEMIA: ICD-10-CM

## 2021-11-30 DIAGNOSIS — I10 PRIMARY HYPERTENSION: ICD-10-CM

## 2021-11-30 DIAGNOSIS — E03.8 HYPOTHYROIDISM DUE TO HASHIMOTO'S THYROIDITIS: ICD-10-CM

## 2021-11-30 DIAGNOSIS — E06.3 HYPOTHYROIDISM DUE TO HASHIMOTO'S THYROIDITIS: ICD-10-CM

## 2021-11-30 DIAGNOSIS — M54.12 CERVICAL RADICULOPATHY: ICD-10-CM

## 2021-11-30 DIAGNOSIS — E53.8 B12 DEFICIENCY: ICD-10-CM

## 2021-11-30 DIAGNOSIS — Z11.59 ENCOUNTER FOR HEPATITIS C SCREENING TEST FOR LOW RISK PATIENT: ICD-10-CM

## 2021-11-30 DIAGNOSIS — M54.2 CERVICALGIA: ICD-10-CM

## 2021-11-30 DIAGNOSIS — E03.8 HYPOTHYROIDISM DUE TO HASHIMOTO'S THYROIDITIS: Primary | ICD-10-CM

## 2021-11-30 DIAGNOSIS — M48.02 CERVICAL STENOSIS OF SPINAL CANAL: ICD-10-CM

## 2021-11-30 LAB
25(OH)D3+25(OH)D2 SERPL-MCNC: 34 NG/ML (ref 30–96)
ALBUMIN SERPL BCP-MCNC: 4 G/DL (ref 3.5–5.2)
ALP SERPL-CCNC: 74 U/L (ref 55–135)
ALT SERPL W/O P-5'-P-CCNC: 41 U/L (ref 10–44)
ANION GAP SERPL CALC-SCNC: 10 MMOL/L (ref 8–16)
AST SERPL-CCNC: 27 U/L (ref 10–40)
BILIRUB SERPL-MCNC: 0.5 MG/DL (ref 0.1–1)
BUN SERPL-MCNC: 11 MG/DL (ref 6–20)
CALCIUM SERPL-MCNC: 9.2 MG/DL (ref 8.7–10.5)
CHLORIDE SERPL-SCNC: 108 MMOL/L (ref 95–110)
CHOLEST SERPL-MCNC: 162 MG/DL (ref 120–199)
CHOLEST/HDLC SERPL: 3.1 {RATIO} (ref 2–5)
CO2 SERPL-SCNC: 25 MMOL/L (ref 23–29)
CREAT SERPL-MCNC: 1 MG/DL (ref 0.5–1.4)
EST. GFR  (AFRICAN AMERICAN): >60 ML/MIN/1.73 M^2
EST. GFR  (NON AFRICAN AMERICAN): >60 ML/MIN/1.73 M^2
GLUCOSE SERPL-MCNC: 100 MG/DL (ref 70–110)
HDLC SERPL-MCNC: 52 MG/DL (ref 40–75)
HDLC SERPL: 32.1 % (ref 20–50)
LDLC SERPL CALC-MCNC: 78.6 MG/DL (ref 63–159)
NONHDLC SERPL-MCNC: 110 MG/DL
POTASSIUM SERPL-SCNC: 4.3 MMOL/L (ref 3.5–5.1)
PROT SERPL-MCNC: 6.6 G/DL (ref 6–8.4)
SODIUM SERPL-SCNC: 143 MMOL/L (ref 136–145)
T3 SERPL-MCNC: 75 NG/DL (ref 60–180)
T4 FREE SERPL-MCNC: 0.92 NG/DL (ref 0.71–1.51)
TRIGL SERPL-MCNC: 157 MG/DL (ref 30–150)
TSH SERPL DL<=0.005 MIU/L-ACNC: 0.47 UIU/ML (ref 0.4–4)
VIT B12 SERPL-MCNC: 490 PG/ML (ref 210–950)

## 2021-11-30 PROCEDURE — 36415 COLL VENOUS BLD VENIPUNCTURE: CPT | Mod: PO | Performed by: INTERNAL MEDICINE

## 2021-11-30 PROCEDURE — 99999 PR PBB SHADOW E&M-EST. PATIENT-LVL V: ICD-10-PCS | Mod: PBBFAC,,, | Performed by: INTERNAL MEDICINE

## 2021-11-30 PROCEDURE — 99999 PR PBB SHADOW E&M-EST. PATIENT-LVL V: CPT | Mod: PBBFAC,,, | Performed by: INTERNAL MEDICINE

## 2021-11-30 PROCEDURE — 82607 VITAMIN B-12: CPT | Performed by: INTERNAL MEDICINE

## 2021-11-30 PROCEDURE — 80053 COMPREHEN METABOLIC PANEL: CPT | Performed by: INTERNAL MEDICINE

## 2021-11-30 PROCEDURE — 84480 ASSAY TRIIODOTHYRONINE (T3): CPT | Performed by: INTERNAL MEDICINE

## 2021-11-30 PROCEDURE — 84439 ASSAY OF FREE THYROXINE: CPT | Performed by: INTERNAL MEDICINE

## 2021-11-30 PROCEDURE — 99214 PR OFFICE/OUTPT VISIT, EST, LEVL IV, 30-39 MIN: ICD-10-PCS | Mod: S$GLB,,, | Performed by: INTERNAL MEDICINE

## 2021-11-30 PROCEDURE — 99214 OFFICE O/P EST MOD 30 MIN: CPT | Mod: S$GLB,,, | Performed by: INTERNAL MEDICINE

## 2021-11-30 PROCEDURE — 84443 ASSAY THYROID STIM HORMONE: CPT | Performed by: INTERNAL MEDICINE

## 2021-11-30 PROCEDURE — 82306 VITAMIN D 25 HYDROXY: CPT | Performed by: INTERNAL MEDICINE

## 2021-11-30 PROCEDURE — 4010F PR ACE/ARB THEARPY RXD/TAKEN: ICD-10-PCS | Mod: CPTII,S$GLB,, | Performed by: INTERNAL MEDICINE

## 2021-11-30 PROCEDURE — 86803 HEPATITIS C AB TEST: CPT | Performed by: INTERNAL MEDICINE

## 2021-11-30 PROCEDURE — 4010F ACE/ARB THERAPY RXD/TAKEN: CPT | Mod: CPTII,S$GLB,, | Performed by: INTERNAL MEDICINE

## 2021-11-30 PROCEDURE — 80061 LIPID PANEL: CPT | Performed by: INTERNAL MEDICINE

## 2021-11-30 RX ORDER — CARISOPRODOL 350 MG/1
350 TABLET ORAL DAILY PRN
Qty: 10 TABLET | Refills: 0 | Status: SHIPPED | OUTPATIENT
Start: 2021-11-30 | End: 2021-12-22

## 2021-12-01 LAB — HCV AB SERPL QL IA: NEGATIVE

## 2021-12-07 ENCOUNTER — PATIENT OUTREACH (OUTPATIENT)
Dept: ADMINISTRATIVE | Facility: OTHER | Age: 46
End: 2021-12-07
Payer: COMMERCIAL

## 2021-12-08 ENCOUNTER — OFFICE VISIT (OUTPATIENT)
Dept: SPORTS MEDICINE | Facility: CLINIC | Age: 46
End: 2021-12-08
Payer: COMMERCIAL

## 2021-12-08 VITALS — HEIGHT: 69 IN | WEIGHT: 255 LBS | BODY MASS INDEX: 37.77 KG/M2

## 2021-12-08 DIAGNOSIS — M25.561 ACUTE PAIN OF RIGHT KNEE: Primary | ICD-10-CM

## 2021-12-08 PROCEDURE — 4010F PR ACE/ARB THEARPY RXD/TAKEN: ICD-10-PCS | Mod: CPTII,S$GLB,, | Performed by: ORTHOPAEDIC SURGERY

## 2021-12-08 PROCEDURE — 99213 OFFICE O/P EST LOW 20 MIN: CPT | Mod: S$GLB,,, | Performed by: ORTHOPAEDIC SURGERY

## 2021-12-08 PROCEDURE — 4010F ACE/ARB THERAPY RXD/TAKEN: CPT | Mod: CPTII,S$GLB,, | Performed by: ORTHOPAEDIC SURGERY

## 2021-12-08 PROCEDURE — 99213 PR OFFICE/OUTPT VISIT, EST, LEVL III, 20-29 MIN: ICD-10-PCS | Mod: S$GLB,,, | Performed by: ORTHOPAEDIC SURGERY

## 2021-12-08 PROCEDURE — 99999 PR PBB SHADOW E&M-EST. PATIENT-LVL III: CPT | Mod: PBBFAC,,, | Performed by: ORTHOPAEDIC SURGERY

## 2021-12-08 PROCEDURE — 99999 PR PBB SHADOW E&M-EST. PATIENT-LVL III: ICD-10-PCS | Mod: PBBFAC,,, | Performed by: ORTHOPAEDIC SURGERY

## 2021-12-09 ENCOUNTER — PATIENT MESSAGE (OUTPATIENT)
Dept: ORTHOPEDICS | Facility: CLINIC | Age: 46
End: 2021-12-09
Payer: COMMERCIAL

## 2021-12-09 ENCOUNTER — TELEPHONE (OUTPATIENT)
Dept: ORTHOPEDICS | Facility: CLINIC | Age: 46
End: 2021-12-09
Payer: COMMERCIAL

## 2021-12-09 DIAGNOSIS — M50.30 DDD (DEGENERATIVE DISC DISEASE), CERVICAL: Primary | ICD-10-CM

## 2021-12-15 ENCOUNTER — HOSPITAL ENCOUNTER (OUTPATIENT)
Dept: RADIOLOGY | Facility: HOSPITAL | Age: 46
Discharge: HOME | End: 2021-12-15
Attending: PHYSICIAN ASSISTANT
Payer: COMMERCIAL

## 2021-12-15 ENCOUNTER — OFFICE VISIT (OUTPATIENT)
Dept: ORTHOPEDICS | Facility: CLINIC | Age: 46
End: 2021-12-15
Payer: COMMERCIAL

## 2021-12-15 VITALS — BODY MASS INDEX: 37.55 KG/M2 | HEIGHT: 69 IN | WEIGHT: 253.5 LBS

## 2021-12-15 DIAGNOSIS — G95.9 CERVICAL MYELOPATHY: ICD-10-CM

## 2021-12-15 DIAGNOSIS — M50.30 DDD (DEGENERATIVE DISC DISEASE), CERVICAL: ICD-10-CM

## 2021-12-15 DIAGNOSIS — M54.12 CERVICAL RADICULOPATHY: Primary | ICD-10-CM

## 2021-12-15 PROCEDURE — 4010F ACE/ARB THERAPY RXD/TAKEN: CPT | Mod: CPTII,S$GLB,, | Performed by: PHYSICIAN ASSISTANT

## 2021-12-15 PROCEDURE — 4010F PR ACE/ARB THEARPY RXD/TAKEN: ICD-10-PCS | Mod: CPTII,S$GLB,, | Performed by: PHYSICIAN ASSISTANT

## 2021-12-15 PROCEDURE — 72050 X-RAY EXAM NECK SPINE 4/5VWS: CPT | Mod: 26,,, | Performed by: RADIOLOGY

## 2021-12-15 PROCEDURE — 99999 PR PBB SHADOW E&M-EST. PATIENT-LVL III: CPT | Mod: PBBFAC,,, | Performed by: PHYSICIAN ASSISTANT

## 2021-12-15 PROCEDURE — 72050 X-RAY EXAM NECK SPINE 4/5VWS: CPT | Mod: TC

## 2021-12-15 PROCEDURE — 99213 OFFICE O/P EST LOW 20 MIN: CPT | Mod: S$GLB,,, | Performed by: PHYSICIAN ASSISTANT

## 2021-12-15 PROCEDURE — 99213 PR OFFICE/OUTPT VISIT, EST, LEVL III, 20-29 MIN: ICD-10-PCS | Mod: S$GLB,,, | Performed by: PHYSICIAN ASSISTANT

## 2021-12-15 PROCEDURE — 99999 PR PBB SHADOW E&M-EST. PATIENT-LVL III: ICD-10-PCS | Mod: PBBFAC,,, | Performed by: PHYSICIAN ASSISTANT

## 2021-12-15 PROCEDURE — 72050 XR CERVICAL SPINE AP LAT WITH FLEX EXTEN: ICD-10-PCS | Mod: 26,,, | Performed by: RADIOLOGY

## 2021-12-16 ENCOUNTER — TELEPHONE (OUTPATIENT)
Dept: ORTHOPEDICS | Facility: CLINIC | Age: 46
End: 2021-12-16
Payer: COMMERCIAL

## 2021-12-21 ENCOUNTER — HOSPITAL ENCOUNTER (OUTPATIENT)
Dept: RADIOLOGY | Facility: HOSPITAL | Age: 46
Discharge: HOME | End: 2021-12-21
Attending: PHYSICIAN ASSISTANT
Payer: COMMERCIAL

## 2021-12-21 DIAGNOSIS — M54.12 CERVICAL RADICULOPATHY: ICD-10-CM

## 2021-12-21 DIAGNOSIS — G95.9 CERVICAL MYELOPATHY: ICD-10-CM

## 2021-12-21 PROCEDURE — 72141 MRI NECK SPINE W/O DYE: CPT | Mod: TC,PO

## 2021-12-21 PROCEDURE — 72141 MRI CERVICAL SPINE WITHOUT CONTRAST: ICD-10-PCS | Mod: 26,,, | Performed by: RADIOLOGY

## 2021-12-21 PROCEDURE — 72141 MRI NECK SPINE W/O DYE: CPT | Mod: 26,,, | Performed by: RADIOLOGY

## 2021-12-22 ENCOUNTER — OFFICE VISIT (OUTPATIENT)
Dept: ORTHOPEDICS | Facility: CLINIC | Age: 46
End: 2021-12-22
Attending: PHYSICIAN ASSISTANT
Payer: COMMERCIAL

## 2021-12-22 ENCOUNTER — PATIENT MESSAGE (OUTPATIENT)
Dept: ORTHOPEDICS | Facility: CLINIC | Age: 46
End: 2021-12-22

## 2021-12-22 VITALS — WEIGHT: 253 LBS | BODY MASS INDEX: 37.47 KG/M2 | HEIGHT: 69 IN

## 2021-12-22 DIAGNOSIS — M54.12 CERVICAL RADICULOPATHY: Primary | ICD-10-CM

## 2021-12-22 PROCEDURE — 1159F PR MEDICATION LIST DOCUMENTED IN MEDICAL RECORD: ICD-10-PCS | Mod: CPTII,S$GLB,, | Performed by: PHYSICIAN ASSISTANT

## 2021-12-22 PROCEDURE — 4010F PR ACE/ARB THEARPY RXD/TAKEN: ICD-10-PCS | Mod: CPTII,S$GLB,, | Performed by: PHYSICIAN ASSISTANT

## 2021-12-22 PROCEDURE — 3008F BODY MASS INDEX DOCD: CPT | Mod: CPTII,S$GLB,, | Performed by: PHYSICIAN ASSISTANT

## 2021-12-22 PROCEDURE — 99213 PR OFFICE/OUTPT VISIT, EST, LEVL III, 20-29 MIN: ICD-10-PCS | Mod: S$GLB,,, | Performed by: PHYSICIAN ASSISTANT

## 2021-12-22 PROCEDURE — 99213 OFFICE O/P EST LOW 20 MIN: CPT | Mod: S$GLB,,, | Performed by: PHYSICIAN ASSISTANT

## 2021-12-22 PROCEDURE — 1160F RVW MEDS BY RX/DR IN RCRD: CPT | Mod: CPTII,S$GLB,, | Performed by: PHYSICIAN ASSISTANT

## 2021-12-22 PROCEDURE — 99999 PR PBB SHADOW E&M-EST. PATIENT-LVL III: ICD-10-PCS | Mod: PBBFAC,,, | Performed by: PHYSICIAN ASSISTANT

## 2021-12-22 PROCEDURE — 1159F MED LIST DOCD IN RCRD: CPT | Mod: CPTII,S$GLB,, | Performed by: PHYSICIAN ASSISTANT

## 2021-12-22 PROCEDURE — 4010F ACE/ARB THERAPY RXD/TAKEN: CPT | Mod: CPTII,S$GLB,, | Performed by: PHYSICIAN ASSISTANT

## 2021-12-22 PROCEDURE — 3008F PR BODY MASS INDEX (BMI) DOCUMENTED: ICD-10-PCS | Mod: CPTII,S$GLB,, | Performed by: PHYSICIAN ASSISTANT

## 2021-12-22 PROCEDURE — 99999 PR PBB SHADOW E&M-EST. PATIENT-LVL III: CPT | Mod: PBBFAC,,, | Performed by: PHYSICIAN ASSISTANT

## 2021-12-22 PROCEDURE — 1160F PR REVIEW ALL MEDS BY PRESCRIBER/CLIN PHARMACIST DOCUMENTED: ICD-10-PCS | Mod: CPTII,S$GLB,, | Performed by: PHYSICIAN ASSISTANT

## 2021-12-22 RX ORDER — CYCLOBENZAPRINE HCL 5 MG
5 TABLET ORAL 3 TIMES DAILY PRN
Qty: 60 TABLET | Refills: 0 | Status: SHIPPED | OUTPATIENT
Start: 2021-12-22 | End: 2022-01-11

## 2021-12-30 ENCOUNTER — TELEPHONE (OUTPATIENT)
Dept: PAIN MEDICINE | Facility: CLINIC | Age: 46
End: 2021-12-30
Payer: COMMERCIAL

## 2021-12-30 DIAGNOSIS — M54.12 CERVICAL RADICULOPATHY: Primary | ICD-10-CM

## 2021-12-30 DIAGNOSIS — Z01.818 PRE-OP TESTING: ICD-10-CM

## 2021-12-30 RX ORDER — SODIUM CHLORIDE, SODIUM LACTATE, POTASSIUM CHLORIDE, CALCIUM CHLORIDE 600; 310; 30; 20 MG/100ML; MG/100ML; MG/100ML; MG/100ML
INJECTION, SOLUTION INTRAVENOUS CONTINUOUS
Status: CANCELLED | OUTPATIENT
Start: 2022-01-13

## 2022-01-06 ENCOUNTER — PATIENT MESSAGE (OUTPATIENT)
Dept: PAIN MEDICINE | Facility: CLINIC | Age: 47
End: 2022-01-06
Payer: COMMERCIAL

## 2022-01-06 ENCOUNTER — OFFICE VISIT (OUTPATIENT)
Dept: OBSTETRICS AND GYNECOLOGY | Facility: CLINIC | Age: 47
End: 2022-01-06
Payer: COMMERCIAL

## 2022-01-06 VITALS
SYSTOLIC BLOOD PRESSURE: 122 MMHG | HEIGHT: 69 IN | WEIGHT: 251.56 LBS | BODY MASS INDEX: 37.26 KG/M2 | DIASTOLIC BLOOD PRESSURE: 80 MMHG

## 2022-01-06 DIAGNOSIS — Z63.79 OTHER STRESSFUL LIFE EVENTS AFFECTING FAMILY AND HOUSEHOLD: ICD-10-CM

## 2022-01-06 DIAGNOSIS — Z12.31 VISIT FOR SCREENING MAMMOGRAM: ICD-10-CM

## 2022-01-06 DIAGNOSIS — Z01.419 ENCOUNTER FOR GYNECOLOGICAL EXAMINATION WITHOUT ABNORMAL FINDING: Primary | ICD-10-CM

## 2022-01-06 DIAGNOSIS — N95.1 MENOPAUSAL SYMPTOMS: ICD-10-CM

## 2022-01-06 PROCEDURE — 99999 PR PBB SHADOW E&M-EST. PATIENT-LVL IV: ICD-10-PCS | Mod: PBBFAC,,, | Performed by: OBSTETRICS & GYNECOLOGY

## 2022-01-06 PROCEDURE — 99396 PR PREVENTIVE VISIT,EST,40-64: ICD-10-PCS | Mod: S$GLB,,, | Performed by: OBSTETRICS & GYNECOLOGY

## 2022-01-06 PROCEDURE — 3008F BODY MASS INDEX DOCD: CPT | Mod: CPTII,S$GLB,, | Performed by: OBSTETRICS & GYNECOLOGY

## 2022-01-06 PROCEDURE — 1159F MED LIST DOCD IN RCRD: CPT | Mod: CPTII,S$GLB,, | Performed by: OBSTETRICS & GYNECOLOGY

## 2022-01-06 PROCEDURE — 3074F PR MOST RECENT SYSTOLIC BLOOD PRESSURE < 130 MM HG: ICD-10-PCS | Mod: CPTII,S$GLB,, | Performed by: OBSTETRICS & GYNECOLOGY

## 2022-01-06 PROCEDURE — 3008F PR BODY MASS INDEX (BMI) DOCUMENTED: ICD-10-PCS | Mod: CPTII,S$GLB,, | Performed by: OBSTETRICS & GYNECOLOGY

## 2022-01-06 PROCEDURE — 3079F DIAST BP 80-89 MM HG: CPT | Mod: CPTII,S$GLB,, | Performed by: OBSTETRICS & GYNECOLOGY

## 2022-01-06 PROCEDURE — 3079F PR MOST RECENT DIASTOLIC BLOOD PRESSURE 80-89 MM HG: ICD-10-PCS | Mod: CPTII,S$GLB,, | Performed by: OBSTETRICS & GYNECOLOGY

## 2022-01-06 PROCEDURE — 99396 PREV VISIT EST AGE 40-64: CPT | Mod: S$GLB,,, | Performed by: OBSTETRICS & GYNECOLOGY

## 2022-01-06 PROCEDURE — 99999 PR PBB SHADOW E&M-EST. PATIENT-LVL IV: CPT | Mod: PBBFAC,,, | Performed by: OBSTETRICS & GYNECOLOGY

## 2022-01-06 PROCEDURE — 3074F SYST BP LT 130 MM HG: CPT | Mod: CPTII,S$GLB,, | Performed by: OBSTETRICS & GYNECOLOGY

## 2022-01-06 PROCEDURE — 1159F PR MEDICATION LIST DOCUMENTED IN MEDICAL RECORD: ICD-10-PCS | Mod: CPTII,S$GLB,, | Performed by: OBSTETRICS & GYNECOLOGY

## 2022-01-06 RX ORDER — CLOTRIMAZOLE 1 %
CREAM (GRAM) TOPICAL
COMMUNITY
Start: 2021-12-11 | End: 2022-05-10

## 2022-01-06 RX ORDER — ESTRADIOL 2 MG/1
2 TABLET ORAL DAILY
Qty: 90 TABLET | Refills: 3 | Status: SHIPPED | OUTPATIENT
Start: 2022-01-06 | End: 2023-03-30 | Stop reason: SDUPTHER

## 2022-01-06 RX ORDER — COVID-19 MOLECULAR TEST ASSAY
KIT MISCELLANEOUS
COMMUNITY
Start: 2021-08-12 | End: 2022-05-09

## 2022-01-06 RX ORDER — METHOCARBAMOL 750 MG/1
TABLET, FILM COATED ORAL
COMMUNITY
End: 2022-03-08 | Stop reason: SDUPTHER

## 2022-01-06 RX ORDER — ESCITALOPRAM OXALATE 10 MG/1
10 TABLET ORAL DAILY
Qty: 30 TABLET | Refills: 2 | Status: SHIPPED | OUTPATIENT
Start: 2022-01-06 | End: 2022-03-08

## 2022-01-06 NOTE — PROGRESS NOTES
"Well woman exam    Ninfa Aguilar is a 46 y.o.   patient who presents for a well woman exam.  LMP: Patient's last menstrual period was 01/15/2017.  Patient reports menopausal symptoms including hot flashes and emotional lability that have worsened due to significant family stressors.  Patient would like to consider starting something for depression/anxiety.  Patient reports that depression symptoms are mild with no homicidal or suicidal ideations.  Occasional intermittent pelvic discomfort reported which patient associates with "ovarian cysts..  No other gynecologic issues, problems, or complaints.      Past Medical History:   Diagnosis Date    Abnormal Pap smear of cervix     Arthritis     Back pain     Cervical disc syndrome     Depression     Essential tremor     General anesthetics causing adverse effect in therapeutic use     patient reports she was told she "woke up" during tubal ligation    Goiter     MNG    Hypothyroidism     Lumbar disc disease     Patient is Judaism     Polycystic ovaries     PONV (postoperative nausea and vomiting)     Vitamin D deficiency      Past Surgical History:   Procedure Laterality Date    APPENDECTOMY      BREAST BIOPSY Left     Excisional removal of lymph node, benign    DILATION AND CURETTAGE OF UTERUS      FOOT MASS EXCISION Right 10/30/2018    Procedure: EXCISION, MASS, FOOT probable fibroma;  Surgeon: Ha Caicedo MD;  Location: [address];  Service: Orthopedics;  Laterality: Right;    HYSTERECTOMY  2017    [facility] ov in situ     OVARIAN CYST SURGERY Right     SHOULDER SURGERY      right    tubal lig  1998     Social History     Socioeconomic History    Marital status:    Tobacco Use    Smoking status: Former Smoker     Packs/day: 0.10     Years: 5.00     Pack years: 0.50     Types: Cigarettes     Quit date: 1992     Years since quittin.5    Smokeless tobacco: Former User   Substance and Sexual " "Activity    Alcohol use: Yes     Alcohol/week: 1.0 - 2.0 standard drink     Types: 1 - 2 Glasses of wine per week     Comment: daily    Drug use: No    Sexual activity: Yes     Partners: Male     Birth control/protection: None, See Surgical Hx     Comment:  to M[place]      Social Determinants of Health     Financial Resource Strain: Low Risk     Difficulty of Paying Living Expenses: Not very hard   Food Insecurity: No Food Insecurity    Worried About Running Out of Food in the Last Year: Never true    Ran Out of Food in the Last Year: Never true   Transportation Needs: No Transportation Needs    Lack of Transportation (Medical): No    Lack of Transportation (Non-Medical): No   Physical Activity: Insufficiently Active    Days of Exercise per Week: 4 days    Minutes of Exercise per Session: 30 min   Stress: No Stress Concern Present    Feeling of Stress : Only a little   Social Connections: Unknown    Frequency of Communication with Friends and Family: More than three times a week    Frequency of Social Gatherings with Friends and Family: More than three times a week    Active Member of Clubs or Organizations: Yes    Attends Club or Organization Meetings: More than 4 times per year    Marital Status:    Housing Stability: Low Risk     Unable to Pay for Housing in the Last Year: No    Number of Places Lived in the Last Year: 1    Unstable Housing in the Last Year: No     Family History   Problem Relation Age of Onset    Cancer Mother 50        breast    Breast cancer Mother     COPD Father     Peripheral vascular disease Father     Asperger's syndrome Daughter     Thyroid disease Daughter         Hashimotos'    Ovarian cancer Neg Hx      OB History        2    Para   2    Term   2            AB        Living   2       SAB        IAB        Ectopic        Multiple        Live Births                     /80   Ht 5' 9" (1.753 m)   Wt 114.1 kg (251 lb 8.7 oz)   " LMP 01/15/2017   BMI 37.15 kg/m²       ROS:  GENERAL: Denies weight gain or weight loss. Feeling well overall.   SKIN: Denies rash or lesions.   HEAD: Denies head injury or headache.   NODES: Denies enlarged lymph nodes.   CHEST: Denies chest pain or shortness of breath.   CARDIOVASCULAR: Denies palpitations or left sided chest pain.   ABDOMEN: No abdominal pain, constipation, diarrhea, nausea, vomiting or rectal bleeding.   URINARY: No frequency, dysuria, hematuria, or burning on urination.  REPRODUCTIVE: See HPI.   BREASTS: The patient performs breast self-examination and denies pain, lumps, or nipple discharge.   HEMATOLOGIC: No easy bruisability or excessive bleeding.   MUSCULOSKELETAL: Denies joint pain or swelling.   NEUROLOGIC: Denies syncope or weakness.   PSYCHIATRIC: Denies depression, anxiety or mood swings.    PHYSICAL EXAM:  APPEARANCE: Well nourished, well developed, in no acute distress.  AFFECT: WNL, alert and oriented x 3  SKIN: No acne or hirsutism  NECK: Neck symmetric without masses or thyromegaly  NODES: No inguinal, cervical, axillary, or femoral lymph node enlargement  CHEST: Good respiratory effect  ABDOMEN: Soft.  No tenderness or masses.  No hepatosplenomegaly.  No hernias.  BREASTS: Symmetrical, no skin changes or visible lesions.  No palpable masses, nipple discharge bilaterally.  PELVIC: Normal external genitalia.  Two small skin tags noted on upper portion of left labia minora (no change from prior examination).  Normal hair distribution.  Adequate perineal body, normal urethral meatus.  Vagina moist and well rugated without lesions or discharge.  Vaginal cuff with good support.  Cervix/uterus absent. No significant cystocele or rectocele.  Bimanual exam reveals no adnexal mass or tenderness bilaterally.    EXTREMITIES: No edema.    Encounter for gynecological examination without abnormal finding    Menopausal symptoms  -     estradioL (ESTRACE) 2 MG tablet; Take 1 tablet (2 mg total)  by mouth once daily. TAKE 1 TABLET(2 MG) BY MOUTH EVERY DAY  Dispense: 90 tablet; Refill: 3    Other stressful life events affecting family and household  -     EScitalopram oxalate (LEXAPRO) 10 MG tablet; Take 1 tablet (10 mg total) by mouth once daily.  Dispense: 30 tablet; Refill: 2    Visit for screening mammogram  -     Mammo Digital Screening Bilat; Future; Expected date: 01/06/2022      Age specific counseling.    Orders as above.    Patient going through significant life stressors affecting her quality life/family life.  I am recommending starting low-dose Lexapro 10 mg by mouth daily for 3 months.  Patient to monitor symptoms on Lexapro.    Risk and benefits ERT reviewed.  ERx done today (as above).    Patient struck to monitor for pelvic discomfort/persistent pelvic pain.  If worsening discomfort is noted or continued unilateral discomfort is noted, I am recommending a pelvic sonogram.  Patient verbalized understanding of this discussion recommendation.  Patient benign bimanual exam by me today.    Patient was counseled today on A.C.S. Pap guidelines and recommendations for yearly pelvic exams, mammograms and monthly self breast exams; to see her PCP for other health maintenance.     Follow up in about 1 year (around 1/6/2023) for Annual exam.     Tr Roberts IV, MD

## 2022-01-10 ENCOUNTER — LAB VISIT (OUTPATIENT)
Dept: FAMILY MEDICINE | Facility: CLINIC | Age: 47
End: 2022-01-10
Payer: COMMERCIAL

## 2022-01-10 DIAGNOSIS — Z01.818 PRE-OP TESTING: ICD-10-CM

## 2022-01-10 PROCEDURE — U0005 INFEC AGEN DETEC AMPLI PROBE: HCPCS | Performed by: ANESTHESIOLOGY

## 2022-01-10 PROCEDURE — U0003 INFECTIOUS AGENT DETECTION BY NUCLEIC ACID (DNA OR RNA); SEVERE ACUTE RESPIRATORY SYNDROME CORONAVIRUS 2 (SARS-COV-2) (CORONAVIRUS DISEASE [COVID-19]), AMPLIFIED PROBE TECHNIQUE, MAKING USE OF HIGH THROUGHPUT TECHNOLOGIES AS DESCRIBED BY CMS-2020-01-R: HCPCS | Performed by: ANESTHESIOLOGY

## 2022-01-11 LAB — SARS-COV-2 RNA RESP QL NAA+PROBE: NOT DETECTED

## 2022-01-12 ENCOUNTER — PATIENT MESSAGE (OUTPATIENT)
Dept: SURGERY | Facility: HOSPITAL | Age: 47
End: 2022-01-12
Payer: COMMERCIAL

## 2022-01-19 ENCOUNTER — IMMUNIZATION (OUTPATIENT)
Dept: PHARMACY | Facility: CLINIC | Age: 47
End: 2022-01-19
Payer: COMMERCIAL

## 2022-01-19 DIAGNOSIS — Z23 NEED FOR VACCINATION: Primary | ICD-10-CM

## 2022-03-08 ENCOUNTER — OFFICE VISIT (OUTPATIENT)
Dept: FAMILY MEDICINE | Facility: CLINIC | Age: 47
End: 2022-03-08
Payer: COMMERCIAL

## 2022-03-08 ENCOUNTER — PATIENT OUTREACH (OUTPATIENT)
Dept: ADMINISTRATIVE | Facility: HOSPITAL | Age: 47
End: 2022-03-08
Payer: COMMERCIAL

## 2022-03-08 VITALS
SYSTOLIC BLOOD PRESSURE: 120 MMHG | BODY MASS INDEX: 36.24 KG/M2 | DIASTOLIC BLOOD PRESSURE: 80 MMHG | OXYGEN SATURATION: 97 % | HEART RATE: 61 BPM | HEIGHT: 69 IN | WEIGHT: 244.69 LBS

## 2022-03-08 DIAGNOSIS — M54.2 NECK PAIN: Primary | ICD-10-CM

## 2022-03-08 PROCEDURE — 3074F SYST BP LT 130 MM HG: CPT | Mod: CPTII,S$GLB,, | Performed by: INTERNAL MEDICINE

## 2022-03-08 PROCEDURE — 3008F BODY MASS INDEX DOCD: CPT | Mod: CPTII,S$GLB,, | Performed by: INTERNAL MEDICINE

## 2022-03-08 PROCEDURE — 99214 PR OFFICE/OUTPT VISIT, EST, LEVL IV, 30-39 MIN: ICD-10-PCS | Mod: S$GLB,,, | Performed by: INTERNAL MEDICINE

## 2022-03-08 PROCEDURE — 3074F PR MOST RECENT SYSTOLIC BLOOD PRESSURE < 130 MM HG: ICD-10-PCS | Mod: CPTII,S$GLB,, | Performed by: INTERNAL MEDICINE

## 2022-03-08 PROCEDURE — 99214 OFFICE O/P EST MOD 30 MIN: CPT | Mod: S$GLB,,, | Performed by: INTERNAL MEDICINE

## 2022-03-08 PROCEDURE — 3079F PR MOST RECENT DIASTOLIC BLOOD PRESSURE 80-89 MM HG: ICD-10-PCS | Mod: CPTII,S$GLB,, | Performed by: INTERNAL MEDICINE

## 2022-03-08 PROCEDURE — 4010F PR ACE/ARB THEARPY RXD/TAKEN: ICD-10-PCS | Mod: CPTII,S$GLB,, | Performed by: INTERNAL MEDICINE

## 2022-03-08 PROCEDURE — 1160F RVW MEDS BY RX/DR IN RCRD: CPT | Mod: CPTII,S$GLB,, | Performed by: INTERNAL MEDICINE

## 2022-03-08 PROCEDURE — 99999 PR PBB SHADOW E&M-EST. PATIENT-LVL V: ICD-10-PCS | Mod: PBBFAC,,, | Performed by: INTERNAL MEDICINE

## 2022-03-08 PROCEDURE — 3008F PR BODY MASS INDEX (BMI) DOCUMENTED: ICD-10-PCS | Mod: CPTII,S$GLB,, | Performed by: INTERNAL MEDICINE

## 2022-03-08 PROCEDURE — 3079F DIAST BP 80-89 MM HG: CPT | Mod: CPTII,S$GLB,, | Performed by: INTERNAL MEDICINE

## 2022-03-08 PROCEDURE — 4010F ACE/ARB THERAPY RXD/TAKEN: CPT | Mod: CPTII,S$GLB,, | Performed by: INTERNAL MEDICINE

## 2022-03-08 PROCEDURE — 99999 PR PBB SHADOW E&M-EST. PATIENT-LVL V: CPT | Mod: PBBFAC,,, | Performed by: INTERNAL MEDICINE

## 2022-03-08 PROCEDURE — 1159F PR MEDICATION LIST DOCUMENTED IN MEDICAL RECORD: ICD-10-PCS | Mod: CPTII,S$GLB,, | Performed by: INTERNAL MEDICINE

## 2022-03-08 PROCEDURE — 1159F MED LIST DOCD IN RCRD: CPT | Mod: CPTII,S$GLB,, | Performed by: INTERNAL MEDICINE

## 2022-03-08 PROCEDURE — 1160F PR REVIEW ALL MEDS BY PRESCRIBER/CLIN PHARMACIST DOCUMENTED: ICD-10-PCS | Mod: CPTII,S$GLB,, | Performed by: INTERNAL MEDICINE

## 2022-03-08 RX ORDER — CYCLOBENZAPRINE HCL 10 MG
10 TABLET ORAL NIGHTLY PRN
COMMUNITY
Start: 2022-02-22

## 2022-03-08 RX ORDER — METHOCARBAMOL 750 MG/1
750 TABLET, FILM COATED ORAL DAILY PRN
Qty: 15 TABLET | Refills: 3 | Status: SHIPPED | OUTPATIENT
Start: 2022-03-08

## 2022-03-08 RX ORDER — TRAMADOL HYDROCHLORIDE 50 MG/1
50 TABLET ORAL
Qty: 12 TABLET | Refills: 0 | Status: SHIPPED | OUTPATIENT
Start: 2022-03-08
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
## 2022-03-08 NOTE — LETTER
AUTHORIZATION FOR RELEASE OF   CONFIDENTIAL INFORMATION    Dear Aditya Ramirez MD    We are seeing Ninfa Aguilar, date of birth 1975, in the clinic at [facility]. Ramiro Hall DO is the patient's PCP. Ninfa Aguilar has an outstanding lab/procedure at the time we reviewed her chart. In order to help keep her health information updated, she has authorized us to request the following medical record(s):        (  )  MAMMOGRAM                                      (  XX)  COLONOSCOPY      (  )  PAP SMEAR                                          (  )  OUTSIDE LAB RESULTS     (  )  DEXA SCAN                                          (  )  EYE EXAM            (  )  FOOT EXAM                                          (  )  ENTIRE RECORD     (  )  OUTSIDE IMMUNIZATIONS                 (  )  _______________         Please fax records to [facility], Donald J Lemieux, DO, [phone]     If you have any questions, please contact Maria Fernanda Montelongo LPN-CCC          Patient Name: Ninfa Aguilar  : 1975  Patient Phone #: [phone]

## 2022-03-21 ENCOUNTER — PATIENT MESSAGE (OUTPATIENT)
Dept: ADMINISTRATIVE | Facility: HOSPITAL | Age: 47
End: 2022-03-21
Payer: COMMERCIAL

## 2022-05-09 ENCOUNTER — PATIENT MESSAGE (OUTPATIENT)
Dept: SMOKING CESSATION | Facility: CLINIC | Age: 47
End: 2022-05-09
Payer: COMMERCIAL

## 2022-06-10 ENCOUNTER — HOSPITAL ENCOUNTER (OUTPATIENT)
Dept: RADIOLOGY | Facility: HOSPITAL | Age: 47
Discharge: HOME | End: 2022-06-10
Attending: PHYSICIAN ASSISTANT
Payer: COMMERCIAL

## 2022-06-10 ENCOUNTER — OFFICE VISIT (OUTPATIENT)
Dept: SPORTS MEDICINE | Facility: CLINIC | Age: 47
End: 2022-06-10
Payer: COMMERCIAL

## 2022-06-10 VITALS
HEIGHT: 69 IN | WEIGHT: 248 LBS | SYSTOLIC BLOOD PRESSURE: 130 MMHG | HEART RATE: 60 BPM | DIASTOLIC BLOOD PRESSURE: 77 MMHG | BODY MASS INDEX: 36.73 KG/M2

## 2022-06-10 DIAGNOSIS — M25.561 ACUTE BILATERAL KNEE PAIN: Primary | ICD-10-CM

## 2022-06-10 DIAGNOSIS — M17.12 PRIMARY OSTEOARTHRITIS OF LEFT KNEE: ICD-10-CM

## 2022-06-10 DIAGNOSIS — M25.562 ACUTE BILATERAL KNEE PAIN: ICD-10-CM

## 2022-06-10 DIAGNOSIS — M17.11 PRIMARY OSTEOARTHRITIS OF RIGHT KNEE: ICD-10-CM

## 2022-06-10 DIAGNOSIS — M25.562 ACUTE BILATERAL KNEE PAIN: Primary | ICD-10-CM

## 2022-06-10 DIAGNOSIS — M25.561 ACUTE BILATERAL KNEE PAIN: ICD-10-CM

## 2022-06-10 PROCEDURE — 4010F PR ACE/ARB THEARPY RXD/TAKEN: ICD-10-PCS | Mod: CPTII,S$GLB,, | Performed by: PHYSICIAN ASSISTANT

## 2022-06-10 PROCEDURE — 99214 OFFICE O/P EST MOD 30 MIN: CPT | Mod: S$GLB,,, | Performed by: PHYSICIAN ASSISTANT

## 2022-06-10 PROCEDURE — 1159F MED LIST DOCD IN RCRD: CPT | Mod: CPTII,S$GLB,, | Performed by: PHYSICIAN ASSISTANT

## 2022-06-10 PROCEDURE — 99999 PR PBB SHADOW E&M-EST. PATIENT-LVL IV: ICD-10-PCS | Mod: PBBFAC,,, | Performed by: PHYSICIAN ASSISTANT

## 2022-06-10 PROCEDURE — 1160F PR REVIEW ALL MEDS BY PRESCRIBER/CLIN PHARMACIST DOCUMENTED: ICD-10-PCS | Mod: CPTII,S$GLB,, | Performed by: PHYSICIAN ASSISTANT

## 2022-06-10 PROCEDURE — 1159F PR MEDICATION LIST DOCUMENTED IN MEDICAL RECORD: ICD-10-PCS | Mod: CPTII,S$GLB,, | Performed by: PHYSICIAN ASSISTANT

## 2022-06-10 PROCEDURE — 3075F SYST BP GE 130 - 139MM HG: CPT | Mod: CPTII,S$GLB,, | Performed by: PHYSICIAN ASSISTANT

## 2022-06-10 PROCEDURE — 73564 X-RAY EXAM KNEE 4 OR MORE: CPT | Mod: TC,50

## 2022-06-10 PROCEDURE — 73564 XR KNEE ORTHO BILAT WITH FLEXION: ICD-10-PCS | Mod: 26,50,, | Performed by: RADIOLOGY

## 2022-06-10 PROCEDURE — 3078F PR MOST RECENT DIASTOLIC BLOOD PRESSURE < 80 MM HG: ICD-10-PCS | Mod: CPTII,S$GLB,, | Performed by: PHYSICIAN ASSISTANT

## 2022-06-10 PROCEDURE — 3008F BODY MASS INDEX DOCD: CPT | Mod: CPTII,S$GLB,, | Performed by: PHYSICIAN ASSISTANT

## 2022-06-10 PROCEDURE — 3078F DIAST BP <80 MM HG: CPT | Mod: CPTII,S$GLB,, | Performed by: PHYSICIAN ASSISTANT

## 2022-06-10 PROCEDURE — 3075F PR MOST RECENT SYSTOLIC BLOOD PRESS GE 130-139MM HG: ICD-10-PCS | Mod: CPTII,S$GLB,, | Performed by: PHYSICIAN ASSISTANT

## 2022-06-10 PROCEDURE — 1160F RVW MEDS BY RX/DR IN RCRD: CPT | Mod: CPTII,S$GLB,, | Performed by: PHYSICIAN ASSISTANT

## 2022-06-10 PROCEDURE — 3008F PR BODY MASS INDEX (BMI) DOCUMENTED: ICD-10-PCS | Mod: CPTII,S$GLB,, | Performed by: PHYSICIAN ASSISTANT

## 2022-06-10 PROCEDURE — 73564 X-RAY EXAM KNEE 4 OR MORE: CPT | Mod: 26,50,, | Performed by: RADIOLOGY

## 2022-06-10 PROCEDURE — 4010F ACE/ARB THERAPY RXD/TAKEN: CPT | Mod: CPTII,S$GLB,, | Performed by: PHYSICIAN ASSISTANT

## 2022-06-10 PROCEDURE — 99214 PR OFFICE/OUTPT VISIT, EST, LEVL IV, 30-39 MIN: ICD-10-PCS | Mod: S$GLB,,, | Performed by: PHYSICIAN ASSISTANT

## 2022-06-10 PROCEDURE — 99999 PR PBB SHADOW E&M-EST. PATIENT-LVL IV: CPT | Mod: PBBFAC,,, | Performed by: PHYSICIAN ASSISTANT
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
## 2022-06-24 ENCOUNTER — OFFICE VISIT (OUTPATIENT)
Dept: SPORTS MEDICINE | Facility: CLINIC | Age: 47
End: 2022-06-24
Payer: COMMERCIAL

## 2022-06-24 VITALS
SYSTOLIC BLOOD PRESSURE: 140 MMHG | HEIGHT: 69 IN | HEART RATE: 61 BPM | WEIGHT: 244.31 LBS | BODY MASS INDEX: 36.19 KG/M2 | DIASTOLIC BLOOD PRESSURE: 72 MMHG

## 2022-06-24 DIAGNOSIS — M17.12 PRIMARY OSTEOARTHRITIS OF LEFT KNEE: ICD-10-CM

## 2022-06-24 DIAGNOSIS — M17.11 PRIMARY OSTEOARTHRITIS OF RIGHT KNEE: Primary | ICD-10-CM

## 2022-06-24 PROCEDURE — 4010F ACE/ARB THERAPY RXD/TAKEN: CPT | Mod: CPTII,S$GLB,, | Performed by: PHYSICIAN ASSISTANT

## 2022-06-24 PROCEDURE — 99999 PR PBB SHADOW E&M-EST. PATIENT-LVL IV: ICD-10-PCS | Mod: PBBFAC,,, | Performed by: PHYSICIAN ASSISTANT

## 2022-06-24 PROCEDURE — 3078F PR MOST RECENT DIASTOLIC BLOOD PRESSURE < 80 MM HG: ICD-10-PCS | Mod: CPTII,S$GLB,, | Performed by: PHYSICIAN ASSISTANT

## 2022-06-24 PROCEDURE — 1159F MED LIST DOCD IN RCRD: CPT | Mod: CPTII,S$GLB,, | Performed by: PHYSICIAN ASSISTANT

## 2022-06-24 PROCEDURE — 20610 PR DRAIN/INJECT LARGE JOINT/BURSA: ICD-10-PCS | Mod: 50,S$GLB,, | Performed by: PHYSICIAN ASSISTANT

## 2022-06-24 PROCEDURE — 1160F RVW MEDS BY RX/DR IN RCRD: CPT | Mod: CPTII,S$GLB,, | Performed by: PHYSICIAN ASSISTANT

## 2022-06-24 PROCEDURE — 4010F PR ACE/ARB THEARPY RXD/TAKEN: ICD-10-PCS | Mod: CPTII,S$GLB,, | Performed by: PHYSICIAN ASSISTANT

## 2022-06-24 PROCEDURE — 3077F PR MOST RECENT SYSTOLIC BLOOD PRESSURE >= 140 MM HG: ICD-10-PCS | Mod: CPTII,S$GLB,, | Performed by: PHYSICIAN ASSISTANT

## 2022-06-24 PROCEDURE — 3077F SYST BP >= 140 MM HG: CPT | Mod: CPTII,S$GLB,, | Performed by: PHYSICIAN ASSISTANT

## 2022-06-24 PROCEDURE — 20610 DRAIN/INJ JOINT/BURSA W/O US: CPT | Mod: 50,S$GLB,, | Performed by: PHYSICIAN ASSISTANT

## 2022-06-24 PROCEDURE — 3008F PR BODY MASS INDEX (BMI) DOCUMENTED: ICD-10-PCS | Mod: CPTII,S$GLB,, | Performed by: PHYSICIAN ASSISTANT

## 2022-06-24 PROCEDURE — 1160F PR REVIEW ALL MEDS BY PRESCRIBER/CLIN PHARMACIST DOCUMENTED: ICD-10-PCS | Mod: CPTII,S$GLB,, | Performed by: PHYSICIAN ASSISTANT

## 2022-06-24 PROCEDURE — 3008F BODY MASS INDEX DOCD: CPT | Mod: CPTII,S$GLB,, | Performed by: PHYSICIAN ASSISTANT

## 2022-06-24 PROCEDURE — 1159F PR MEDICATION LIST DOCUMENTED IN MEDICAL RECORD: ICD-10-PCS | Mod: CPTII,S$GLB,, | Performed by: PHYSICIAN ASSISTANT

## 2022-06-24 PROCEDURE — 99999 PR PBB SHADOW E&M-EST. PATIENT-LVL IV: CPT | Mod: PBBFAC,,, | Performed by: PHYSICIAN ASSISTANT

## 2022-06-24 PROCEDURE — 99499 UNLISTED E&M SERVICE: CPT | Mod: S$GLB,,, | Performed by: PHYSICIAN ASSISTANT

## 2022-06-24 PROCEDURE — 99499 NO LOS: ICD-10-PCS | Mod: S$GLB,,, | Performed by: PHYSICIAN ASSISTANT

## 2022-06-24 PROCEDURE — 3078F DIAST BP <80 MM HG: CPT | Mod: CPTII,S$GLB,, | Performed by: PHYSICIAN ASSISTANT

## 2022-06-24 NOTE — PROGRESS NOTES
Patient is here for follow up of bilateral knee arthritis. Pt is requesting bilateral Durolane injections.  [facility] reviewed per encounter record. Has failed other conservative modalities including NSAIDS, activity modification, weight loss.    The prior shot was tolerated well.    PHYSICAL EXAMINATION:     General: The patient is alert and oriented x 3. Mood is pleasant.   Observation of ears, eyes and nose reveals no gross abnormalities. No   labored breathing observed.     No signs of infection or adverse reaction to knee.    PROCEDURE NOTE:  Injection Procedure bilateral knees  A time out was performed, including verification of patient ID, procedure, site and side, availability of information and equipment, review of safety issues, and agreement with consent, the procedure site was marked.    After time out was performed, the patient was prepped aseptically with povidone-iodine swabsticks. A diagnostic and therapeutic injection of 3cc Durolane was given under sterile technique using a 22g x 1.5 needle from the Superolateral  aspect of the bilateral Knee Joint in the supine position.      Ninfa Aguilar had no adverse reactions to the medication. Pain decreased. She was instructed to apply ice to the joint for 20 minutes and avoid strenuous activities for 24-36 hours following the injection. She was warned of possible blood sugar and/or blood pressure changes during that time. Following that time, she can resume regular activities.    She was reminded to call the clinic immediately for any adverse side effects as explained in clinic today.    RTC in 6 months with Juliette Cheney PA-C for follow up bilateral knees and possible repeat visco supplementation.  All questions were answered, pt will contact us for questions or concerns in the interim.

## 2022-06-27 ENCOUNTER — HOSPITAL ENCOUNTER (OUTPATIENT)
Dept: RADIOLOGY | Facility: HOSPITAL | Age: 47
Discharge: HOME | End: 2022-06-27
Attending: OBSTETRICS & GYNECOLOGY
Payer: COMMERCIAL

## 2022-06-27 DIAGNOSIS — Z12.31 VISIT FOR SCREENING MAMMOGRAM: ICD-10-CM

## 2022-06-27 PROCEDURE — 77063 MAMMO DIGITAL SCREENING BILAT WITH TOMO: ICD-10-PCS | Mod: 26,,, | Performed by: RADIOLOGY

## 2022-06-27 PROCEDURE — 77067 SCR MAMMO BI INCL CAD: CPT | Mod: 26,,, | Performed by: RADIOLOGY

## 2022-06-27 PROCEDURE — 77067 MAMMO DIGITAL SCREENING BILAT WITH TOMO: ICD-10-PCS | Mod: 26,,, | Performed by: RADIOLOGY

## 2022-06-27 PROCEDURE — 77063 BREAST TOMOSYNTHESIS BI: CPT | Mod: TC,PO

## 2022-06-27 PROCEDURE — 77063 BREAST TOMOSYNTHESIS BI: CPT | Mod: 26,,, | Performed by: RADIOLOGY

## 2022-09-01 ENCOUNTER — OFFICE VISIT (OUTPATIENT)
Dept: URGENT CARE | Facility: CLINIC | Age: 47
End: 2022-09-01
Payer: COMMERCIAL

## 2022-09-01 VITALS
HEIGHT: 69 IN | DIASTOLIC BLOOD PRESSURE: 74 MMHG | HEART RATE: 81 BPM | OXYGEN SATURATION: 99 % | TEMPERATURE: 98 F | WEIGHT: 244 LBS | SYSTOLIC BLOOD PRESSURE: 125 MMHG | BODY MASS INDEX: 36.14 KG/M2 | RESPIRATION RATE: 15 BRPM

## 2022-09-01 DIAGNOSIS — R05.9 COUGH: Primary | ICD-10-CM

## 2022-09-01 DIAGNOSIS — U07.1 COVID-19 VIRUS DETECTED: ICD-10-CM

## 2022-09-01 DIAGNOSIS — U07.1 COVID-19: ICD-10-CM

## 2022-09-01 LAB
CTP QC/QA: YES
SARS-COV-2 RDRP RESP QL NAA+PROBE: POSITIVE

## 2022-09-01 PROCEDURE — 4010F ACE/ARB THERAPY RXD/TAKEN: CPT | Mod: CPTII,S$GLB,, | Performed by: FAMILY MEDICINE

## 2022-09-01 PROCEDURE — 3008F BODY MASS INDEX DOCD: CPT | Mod: CPTII,S$GLB,, | Performed by: FAMILY MEDICINE

## 2022-09-01 PROCEDURE — 1160F PR REVIEW ALL MEDS BY PRESCRIBER/CLIN PHARMACIST DOCUMENTED: ICD-10-PCS | Mod: CPTII,S$GLB,, | Performed by: FAMILY MEDICINE

## 2022-09-01 PROCEDURE — 3074F SYST BP LT 130 MM HG: CPT | Mod: CPTII,S$GLB,, | Performed by: FAMILY MEDICINE

## 2022-09-01 PROCEDURE — 1159F PR MEDICATION LIST DOCUMENTED IN MEDICAL RECORD: ICD-10-PCS | Mod: CPTII,S$GLB,, | Performed by: FAMILY MEDICINE

## 2022-09-01 PROCEDURE — 3078F PR MOST RECENT DIASTOLIC BLOOD PRESSURE < 80 MM HG: ICD-10-PCS | Mod: CPTII,S$GLB,, | Performed by: FAMILY MEDICINE

## 2022-09-01 PROCEDURE — 3008F PR BODY MASS INDEX (BMI) DOCUMENTED: ICD-10-PCS | Mod: CPTII,S$GLB,, | Performed by: FAMILY MEDICINE

## 2022-09-01 PROCEDURE — 99214 OFFICE O/P EST MOD 30 MIN: CPT | Mod: S$GLB,,, | Performed by: FAMILY MEDICINE

## 2022-09-01 PROCEDURE — U0002 COVID-19 LAB TEST NON-CDC: HCPCS | Mod: QW,S$GLB,, | Performed by: FAMILY MEDICINE

## 2022-09-01 PROCEDURE — U0002: ICD-10-PCS | Mod: QW,S$GLB,, | Performed by: FAMILY MEDICINE

## 2022-09-01 PROCEDURE — 99214 PR OFFICE/OUTPT VISIT, EST, LEVL IV, 30-39 MIN: ICD-10-PCS | Mod: S$GLB,,, | Performed by: FAMILY MEDICINE

## 2022-09-01 PROCEDURE — 1160F RVW MEDS BY RX/DR IN RCRD: CPT | Mod: CPTII,S$GLB,, | Performed by: FAMILY MEDICINE

## 2022-09-01 PROCEDURE — 1159F MED LIST DOCD IN RCRD: CPT | Mod: CPTII,S$GLB,, | Performed by: FAMILY MEDICINE

## 2022-09-01 PROCEDURE — 3078F DIAST BP <80 MM HG: CPT | Mod: CPTII,S$GLB,, | Performed by: FAMILY MEDICINE

## 2022-09-01 PROCEDURE — 4010F PR ACE/ARB THEARPY RXD/TAKEN: ICD-10-PCS | Mod: CPTII,S$GLB,, | Performed by: FAMILY MEDICINE

## 2022-09-01 PROCEDURE — 3074F PR MOST RECENT SYSTOLIC BLOOD PRESSURE < 130 MM HG: ICD-10-PCS | Mod: CPTII,S$GLB,, | Performed by: FAMILY MEDICINE

## 2022-09-01 RX ORDER — PROMETHAZINE HYDROCHLORIDE 6.25 MG/5ML
SYRUP ORAL
Qty: 120 ML | Refills: 1 | Status: SHIPPED | OUTPATIENT
Start: 2022-09-01 | End: 2023-03-30

## 2022-09-01 NOTE — PROGRESS NOTES
"Subjective:       Patient ID: Ninfa Aguilar is a 46 y.o. female.    Vitals:  height is 5' 9" (1.753 m) and weight is 110.7 kg (244 lb). Her temperature is 97.5 °F (36.4 °C). Her blood pressure is 125/74 and her pulse is 81. Her respiration is 15 and oxygen saturation is 99%.     Chief Complaint: Sinus Problem    Patient presents to clinic with complaint of congestion, cough, and headaches. Symptoms started Monday. She is vaccinated, no known covid symptoms. She believes that it is from her CPAP machine, she had water in her mask and woke up choking. She has been around her mother in laws cat, pt is allergic to cats.     Sinus Problem  This is a new problem. The current episode started in the past 7 days. The problem is unchanged. There has been no fever. Associated symptoms include congestion and headaches. Pertinent negatives include no chills, coughing, diaphoresis, ear pain, hoarse voice, neck pain, shortness of breath, sinus pressure, sneezing, sore throat or swollen glands.     Constitution: Negative for chills and sweating.   HENT:  Positive for congestion. Negative for ear pain, sinus pressure and sore throat.    Neck: Negative for neck pain.   Respiratory:  Negative for cough and shortness of breath.    Allergic/Immunologic: Negative for sneezing.   Neurological:  Positive for headaches.     Objective:      Physical Exam      Physical Exam  Vitals signs and nursing note reviewed.   Constitutional:       Appearance: Pt is well-developed. Alert, NAD.  Pt is cooperative.  Non-toxic appearance.  HENT:      Head: Normocephalic and atraumatic. .      Right Ear: External ear normal.      Left Ear: External ear normal.   Eyes:      General: Lids are normal.      Conjunctiva/sclera: Conjunctivae normal. Visual tracking is normal. Right eye exhibits no exudate. Left eye exhibits no exudate. No scleral icterus.     Pupils: Pupils are equal, round  Neck:      Musculoskeletal: range of motion without pain and " neck supple.      Trachea: Trachea and phonation normal.   Cardiovascular:      Rate and Rhythm: Normal Rhythm. Extremities well perfused.   Pulmonary:      Effort: Pulmonary effort is normal. No respiratory distress.     Breath sounds: Normal breath sounds.   Abdomen: NO obvious distention.  Musculoskeletal: Normal range of motion. No ambulation issues  Skin:     General: Skin is warm and dry. No open wounds or abrasions. No petechiae No cyanosis  no jaundice not diaphoretic, not pale, not purpuric  Neurological:      Mental Status:Pt is alert and oriented to person, place, and time.   Psychiatric:         Speech: Speech normal.         Behavior: Behavior normal.         Thought Content: Thought content normal.         Judgment: Judgment normal.       Assessment:       1. Cough    2. COVID-19          Plan:         Cough  -     POCT COVID-19 Rapid Screening    COVID-19    Other orders  -     promethazine (PHENERGAN) 6.25 mg/5 mL syrup; 10mL at night as needed  Dispense: 120 mL; Refill: 1

## 2022-09-05 ENCOUNTER — NURSE TRIAGE (OUTPATIENT)
Dept: ADMINISTRATIVE | Facility: CLINIC | Age: 47
End: 2022-09-05
Payer: COMMERCIAL

## 2022-09-05 NOTE — TELEPHONE ENCOUNTER
[facility] call -       Pt c/o  covid pos last thurday at , chest congestion, sputum blood tinged in past but now cannot cough up any sputum and feels a constant chest pressure. Covid protocol followed and pt advised to go to the ER now. Education provided on why, including possible pneumonia, see care advice. Alert and oriented, Pt agrees to follow up with dispo. Questions answered as presented. Encounter routed to PCP and staff as high priority.   Reason for Disposition   SEVERE or constant chest pain or pressure  (Exception: Mild central chest pain, present only when coughing.)    Additional Information   Negative: SEVERE difficulty breathing (e.g., struggling for each breath, speaks in single words)   Negative: Difficult to awaken or acting confused (e.g., disoriented, slurred speech)   Negative: Bluish (or gray) lips or face now   Negative: Shock suspected (e.g., cold/pale/clammy skin, too weak to stand, low BP, rapid pulse)   Negative: Sounds like a life-threatening emergency to the triager    Protocols used: Coronavirus (COVID-19) Diagnosed or [id number]

## 2022-11-15 PROBLEM — I10 PRIMARY HYPERTENSION: Status: ACTIVE | Noted: 2022-11-15

## 2022-11-15 PROBLEM — G62.9 NEUROPATHY: Status: ACTIVE | Noted: 2022-11-15

## 2022-12-15 ENCOUNTER — OFFICE VISIT (OUTPATIENT)
Dept: SPORTS MEDICINE | Facility: CLINIC | Age: 47
End: 2022-12-15
Payer: COMMERCIAL

## 2022-12-15 ENCOUNTER — HOSPITAL ENCOUNTER (OUTPATIENT)
Dept: RADIOLOGY | Facility: HOSPITAL | Age: 47
Discharge: HOME | End: 2022-12-15
Attending: PHYSICIAN ASSISTANT
Payer: COMMERCIAL

## 2022-12-15 VITALS — HEIGHT: 69 IN | WEIGHT: 253 LBS | BODY MASS INDEX: 37.47 KG/M2

## 2022-12-15 DIAGNOSIS — M25.562 PAIN IN BOTH KNEES, UNSPECIFIED CHRONICITY: ICD-10-CM

## 2022-12-15 DIAGNOSIS — M25.561 CHRONIC PAIN OF BOTH KNEES: Primary | ICD-10-CM

## 2022-12-15 DIAGNOSIS — M25.562 CHRONIC PAIN OF BOTH KNEES: Primary | ICD-10-CM

## 2022-12-15 DIAGNOSIS — M17.11 PRIMARY OSTEOARTHRITIS OF RIGHT KNEE: ICD-10-CM

## 2022-12-15 DIAGNOSIS — G89.29 CHRONIC PAIN OF BOTH KNEES: Primary | ICD-10-CM

## 2022-12-15 DIAGNOSIS — M17.12 PRIMARY OSTEOARTHRITIS OF LEFT KNEE: ICD-10-CM

## 2022-12-15 DIAGNOSIS — M25.561 PAIN IN BOTH KNEES, UNSPECIFIED CHRONICITY: ICD-10-CM

## 2022-12-15 PROCEDURE — 3008F BODY MASS INDEX DOCD: CPT | Mod: CPTII,S$GLB,, | Performed by: PHYSICIAN ASSISTANT

## 2022-12-15 PROCEDURE — 3008F PR BODY MASS INDEX (BMI) DOCUMENTED: ICD-10-PCS | Mod: CPTII,S$GLB,, | Performed by: PHYSICIAN ASSISTANT

## 2022-12-15 PROCEDURE — 1159F MED LIST DOCD IN RCRD: CPT | Mod: CPTII,S$GLB,, | Performed by: PHYSICIAN ASSISTANT

## 2022-12-15 PROCEDURE — 1160F PR REVIEW ALL MEDS BY PRESCRIBER/CLIN PHARMACIST DOCUMENTED: ICD-10-PCS | Mod: CPTII,S$GLB,, | Performed by: PHYSICIAN ASSISTANT

## 2022-12-15 PROCEDURE — 99214 OFFICE O/P EST MOD 30 MIN: CPT | Mod: 25,S$GLB,, | Performed by: PHYSICIAN ASSISTANT

## 2022-12-15 PROCEDURE — 4010F PR ACE/ARB THEARPY RXD/TAKEN: ICD-10-PCS | Mod: CPTII,S$GLB,, | Performed by: PHYSICIAN ASSISTANT

## 2022-12-15 PROCEDURE — 99999 PR PBB SHADOW E&M-EST. PATIENT-LVL IV: ICD-10-PCS | Mod: PBBFAC,,, | Performed by: PHYSICIAN ASSISTANT

## 2022-12-15 PROCEDURE — 20610 PR DRAIN/INJECT LARGE JOINT/BURSA: ICD-10-PCS | Mod: RT,S$GLB,, | Performed by: PHYSICIAN ASSISTANT

## 2022-12-15 PROCEDURE — 1160F RVW MEDS BY RX/DR IN RCRD: CPT | Mod: CPTII,S$GLB,, | Performed by: PHYSICIAN ASSISTANT

## 2022-12-15 PROCEDURE — 99214 PR OFFICE/OUTPT VISIT, EST, LEVL IV, 30-39 MIN: ICD-10-PCS | Mod: 25,S$GLB,, | Performed by: PHYSICIAN ASSISTANT

## 2022-12-15 PROCEDURE — 20610 DRAIN/INJ JOINT/BURSA W/O US: CPT | Mod: RT,S$GLB,, | Performed by: PHYSICIAN ASSISTANT

## 2022-12-15 PROCEDURE — 99999 PR PBB SHADOW E&M-EST. PATIENT-LVL IV: CPT | Mod: PBBFAC,,, | Performed by: PHYSICIAN ASSISTANT

## 2022-12-15 PROCEDURE — 4010F ACE/ARB THERAPY RXD/TAKEN: CPT | Mod: CPTII,S$GLB,, | Performed by: PHYSICIAN ASSISTANT

## 2022-12-15 PROCEDURE — 1159F PR MEDICATION LIST DOCUMENTED IN MEDICAL RECORD: ICD-10-PCS | Mod: CPTII,S$GLB,, | Performed by: PHYSICIAN ASSISTANT

## 2022-12-15 RX ORDER — TRIAMCINOLONE ACETONIDE 40 MG/ML
40 INJECTION, SUSPENSION INTRA-ARTICULAR; INTRAMUSCULAR
Status: COMPLETED | OUTPATIENT
Start: 2022-12-15 | End: 2022-12-15

## 2022-12-15 RX ORDER — BUPIVACAINE HYDROCHLORIDE 2.5 MG/ML
3 INJECTION, SOLUTION INFILTRATION; PERINEURAL
Status: COMPLETED | OUTPATIENT
Start: 2022-12-15 | End: 2022-12-15

## 2022-12-15 RX ADMIN — BUPIVACAINE HYDROCHLORIDE 7.5 MG: 2.5 INJECTION, SOLUTION INFILTRATION; PERINEURAL at 03:12

## 2022-12-15 RX ADMIN — TRIAMCINOLONE ACETONIDE 40 MG: 40 INJECTION, SUSPENSION INTRA-ARTICULAR; INTRAMUSCULAR at 03:12
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
## 2022-12-29 ENCOUNTER — OFFICE VISIT (OUTPATIENT)
Dept: SPORTS MEDICINE | Facility: CLINIC | Age: 47
End: 2022-12-29
Payer: COMMERCIAL

## 2022-12-29 VITALS — BODY MASS INDEX: 37.47 KG/M2 | WEIGHT: 253 LBS | HEIGHT: 69 IN

## 2022-12-29 DIAGNOSIS — M17.11 PRIMARY OSTEOARTHRITIS OF RIGHT KNEE: Primary | ICD-10-CM

## 2022-12-29 DIAGNOSIS — M17.12 PRIMARY OSTEOARTHRITIS OF LEFT KNEE: ICD-10-CM

## 2022-12-29 PROCEDURE — 20610 DRAIN/INJ JOINT/BURSA W/O US: CPT | Mod: 50,S$GLB,, | Performed by: PHYSICIAN ASSISTANT

## 2022-12-29 PROCEDURE — 1160F PR REVIEW ALL MEDS BY PRESCRIBER/CLIN PHARMACIST DOCUMENTED: ICD-10-PCS | Mod: CPTII,S$GLB,, | Performed by: PHYSICIAN ASSISTANT

## 2022-12-29 PROCEDURE — 20610 PR DRAIN/INJECT LARGE JOINT/BURSA: ICD-10-PCS | Mod: 50,S$GLB,, | Performed by: PHYSICIAN ASSISTANT

## 2022-12-29 PROCEDURE — 4010F PR ACE/ARB THEARPY RXD/TAKEN: ICD-10-PCS | Mod: CPTII,S$GLB,, | Performed by: PHYSICIAN ASSISTANT

## 2022-12-29 PROCEDURE — 3008F BODY MASS INDEX DOCD: CPT | Mod: CPTII,S$GLB,, | Performed by: PHYSICIAN ASSISTANT

## 2022-12-29 PROCEDURE — 1159F PR MEDICATION LIST DOCUMENTED IN MEDICAL RECORD: ICD-10-PCS | Mod: CPTII,S$GLB,, | Performed by: PHYSICIAN ASSISTANT

## 2022-12-29 PROCEDURE — 99499 UNLISTED E&M SERVICE: CPT | Mod: S$GLB,,, | Performed by: PHYSICIAN ASSISTANT

## 2022-12-29 PROCEDURE — 3008F PR BODY MASS INDEX (BMI) DOCUMENTED: ICD-10-PCS | Mod: CPTII,S$GLB,, | Performed by: PHYSICIAN ASSISTANT

## 2022-12-29 PROCEDURE — 1159F MED LIST DOCD IN RCRD: CPT | Mod: CPTII,S$GLB,, | Performed by: PHYSICIAN ASSISTANT

## 2022-12-29 PROCEDURE — 99999 PR PBB SHADOW E&M-EST. PATIENT-LVL IV: ICD-10-PCS | Mod: PBBFAC,,, | Performed by: PHYSICIAN ASSISTANT

## 2022-12-29 PROCEDURE — 99999 PR PBB SHADOW E&M-EST. PATIENT-LVL IV: CPT | Mod: PBBFAC,,, | Performed by: PHYSICIAN ASSISTANT

## 2022-12-29 PROCEDURE — 1160F RVW MEDS BY RX/DR IN RCRD: CPT | Mod: CPTII,S$GLB,, | Performed by: PHYSICIAN ASSISTANT

## 2022-12-29 PROCEDURE — 99499 NO LOS: ICD-10-PCS | Mod: S$GLB,,, | Performed by: PHYSICIAN ASSISTANT

## 2022-12-29 PROCEDURE — 4010F ACE/ARB THERAPY RXD/TAKEN: CPT | Mod: CPTII,S$GLB,, | Performed by: PHYSICIAN ASSISTANT

## 2022-12-29 NOTE — PROGRESS NOTES
Patient is here for follow up of bilateral knee arthritis. Pt is requesting bilateral Durolane injections.  [facility] reviewed per encounter record. Has failed other conservative modalities including NSAIDS, activity modification, weight loss.    The prior shot was tolerated well.    PHYSICAL EXAMINATION:     General: The patient is alert and oriented x 3. Mood is pleasant.   Observation of ears, eyes and nose reveals no gross abnormalities. No   labored breathing observed.     No signs of infection or adverse reaction to knee.    PROCEDURE NOTE:  Injection Procedure bilateral knees  A time out was performed, including verification of patient ID, procedure, site and side, availability of information and equipment, review of safety issues, and agreement with consent, the procedure site was marked.    After time out was performed, the patient was prepped aseptically with povidone-iodine swabsticks. A diagnostic and therapeutic injection of 3cc Durolane was given under sterile technique using a 22g x 1.5 needle from the Superolateral  aspect of the bilateral Knee Joint in the supine position.      Ninfa Aguilar had no adverse reactions to the medication. Pain decreased. She was instructed to apply ice to the joint for 20 minutes and avoid strenuous activities for 24-36 hours following the injection. She was warned of possible blood sugar and/or blood pressure changes during that time. Following that time, she can resume regular activities.    She was reminded to call the clinic immediately for any adverse side effects as explained in clinic today.    RTC in 6 months with Juliette Cheney PA-C for follow up bilateral knees and possible repeat visco supplementation.  All questions were answered, pt will contact us for questions or concerns in the interim.

## 2023-03-30 ENCOUNTER — OFFICE VISIT (OUTPATIENT)
Dept: OBSTETRICS AND GYNECOLOGY | Facility: CLINIC | Age: 48
End: 2023-03-30
Payer: COMMERCIAL

## 2023-03-30 VITALS
HEIGHT: 69 IN | SYSTOLIC BLOOD PRESSURE: 118 MMHG | DIASTOLIC BLOOD PRESSURE: 80 MMHG | BODY MASS INDEX: 36.51 KG/M2 | WEIGHT: 246.5 LBS

## 2023-03-30 DIAGNOSIS — N63.23 LUMP IN LOWER OUTER QUADRANT OF LEFT BREAST: ICD-10-CM

## 2023-03-30 DIAGNOSIS — Z12.31 VISIT FOR SCREENING MAMMOGRAM: ICD-10-CM

## 2023-03-30 DIAGNOSIS — Z12.31 BREAST CANCER SCREENING BY MAMMOGRAM: ICD-10-CM

## 2023-03-30 DIAGNOSIS — N90.89 SKIN TAG OF LABIA: ICD-10-CM

## 2023-03-30 DIAGNOSIS — N95.1 MENOPAUSAL SYMPTOMS: ICD-10-CM

## 2023-03-30 DIAGNOSIS — Z01.411 ENCOUNTER FOR GYNECOLOGICAL EXAMINATION WITH ABNORMAL FINDING: Primary | ICD-10-CM

## 2023-03-30 PROCEDURE — 3008F BODY MASS INDEX DOCD: CPT | Mod: CPTII,S$GLB,, | Performed by: OBSTETRICS & GYNECOLOGY

## 2023-03-30 PROCEDURE — 99999 PR PBB SHADOW E&M-EST. PATIENT-LVL IV: ICD-10-PCS | Mod: PBBFAC,,, | Performed by: OBSTETRICS & GYNECOLOGY

## 2023-03-30 PROCEDURE — 1159F MED LIST DOCD IN RCRD: CPT | Mod: CPTII,S$GLB,, | Performed by: OBSTETRICS & GYNECOLOGY

## 2023-03-30 PROCEDURE — 99396 PREV VISIT EST AGE 40-64: CPT | Mod: S$GLB,,, | Performed by: OBSTETRICS & GYNECOLOGY

## 2023-03-30 PROCEDURE — 3008F PR BODY MASS INDEX (BMI) DOCUMENTED: ICD-10-PCS | Mod: CPTII,S$GLB,, | Performed by: OBSTETRICS & GYNECOLOGY

## 2023-03-30 PROCEDURE — 99999 PR PBB SHADOW E&M-EST. PATIENT-LVL IV: CPT | Mod: PBBFAC,,, | Performed by: OBSTETRICS & GYNECOLOGY

## 2023-03-30 PROCEDURE — 4010F PR ACE/ARB THEARPY RXD/TAKEN: ICD-10-PCS | Mod: CPTII,S$GLB,, | Performed by: OBSTETRICS & GYNECOLOGY

## 2023-03-30 PROCEDURE — 1159F PR MEDICATION LIST DOCUMENTED IN MEDICAL RECORD: ICD-10-PCS | Mod: CPTII,S$GLB,, | Performed by: OBSTETRICS & GYNECOLOGY

## 2023-03-30 PROCEDURE — 3079F DIAST BP 80-89 MM HG: CPT | Mod: CPTII,S$GLB,, | Performed by: OBSTETRICS & GYNECOLOGY

## 2023-03-30 PROCEDURE — 3079F PR MOST RECENT DIASTOLIC BLOOD PRESSURE 80-89 MM HG: ICD-10-PCS | Mod: CPTII,S$GLB,, | Performed by: OBSTETRICS & GYNECOLOGY

## 2023-03-30 PROCEDURE — 3074F PR MOST RECENT SYSTOLIC BLOOD PRESSURE < 130 MM HG: ICD-10-PCS | Mod: CPTII,S$GLB,, | Performed by: OBSTETRICS & GYNECOLOGY

## 2023-03-30 PROCEDURE — 4010F ACE/ARB THERAPY RXD/TAKEN: CPT | Mod: CPTII,S$GLB,, | Performed by: OBSTETRICS & GYNECOLOGY

## 2023-03-30 PROCEDURE — 99396 PR PREVENTIVE VISIT,EST,40-64: ICD-10-PCS | Mod: S$GLB,,, | Performed by: OBSTETRICS & GYNECOLOGY

## 2023-03-30 PROCEDURE — 3074F SYST BP LT 130 MM HG: CPT | Mod: CPTII,S$GLB,, | Performed by: OBSTETRICS & GYNECOLOGY

## 2023-03-30 RX ORDER — IBUPROFEN 200 MG
200 TABLET ORAL EVERY 6 HOURS PRN
COMMUNITY

## 2023-03-30 RX ORDER — ESTRADIOL 2 MG/1
2 TABLET ORAL DAILY
Qty: 30 TABLET | Refills: 11 | Status: SHIPPED | OUTPATIENT
Start: 2023-03-30 | End: 2024-03-29

## 2023-03-30 NOTE — PROGRESS NOTES
"Well-woman exam    Ninfa Aguilar  is a 47 y.o. year old  patient who presents for a routine well-woman exam.  She is S/P hysterectomy in 2017.  Ovaries are in Situ.  Patient reports intermittent menopausal symptoms.  Patient reports she is taking her Estrace ERT intermittently (twice per week).  Patient also complaining about vaginal dryness and discomfort during sexual relations.  No other gynecologic complaints today.    Chart reviewed      Past Medical History:   Diagnosis Date    Abnormal Pap smear of cervix     Arthritis     Back pain     Cervical disc syndrome     Depression     Essential tremor     General anesthetics causing adverse effect in therapeutic use     patient reports she was told she "woke up" during tubal ligation    Goiter     MNG    Hypothyroidism     Lumbar disc disease     Patient is Bahai     Polycystic ovaries     PONV (postoperative nausea and vomiting)     Vitamin D deficiency        Past Surgical History:   Procedure Laterality Date    APPENDECTOMY      BREAST BIOPSY Left     Excisional removal of lymph node, benign    DILATION AND CURETTAGE OF UTERUS      FOOT MASS EXCISION Right 10/30/2018    Procedure: EXCISION, MASS, FOOT probable fibroma;  Surgeon: Ha Caicedo MD;  Location: [facility] OR [address] [place] [address];  Service: Orthopedics;  Laterality: Right;    HYSTERECTOMY  2017    DL ov in situ     OVARIAN CYST SURGERY Right     SHOULDER SURGERY      right    tubal lig         OB History          2    Para   2    Term   2            AB        Living   2         SAB        IAB        Ectopic        Multiple        Live Births                     ROS:  GENERAL: Feeling well overall.   SKIN: Denies rash or lesions.   HEAD: Denies head injury or headache.   NODES: Denies enlarged lymph nodes.   CHEST: Denies chest pain or shortness of breath.   CARDIOVASCULAR: Denies palpitations or left sided chest pain.   ABDOMEN: No abdominal pain, " nausea, vomiting or rectal bleeding.   URINARY: No dysuria, hematuria, or burning on urination.  REPRODUCTIVE: See HPI.   BREASTS: Denies pain, lumps, or nipple discharge.   HEMATOLOGIC: No easy bruisability or excessive bleeding.   MUSCULOSKELETAL: Denies joint pain or swelling.   NEUROLOGIC: Denies syncope or weakness.   PSYCHIATRIC: Denies depression.    PE:   APPEARANCE: Well nourished, well developed, in no acute distress.  NECK: Neck symmetric without masses or thyromegaly.  NODES: No inguinal lymph node enlargement.  ABDOMEN: Soft. No tenderness or masses. No hernias.  BREASTS: Symmetrical.  No nipple discharge or adenopathy in the axilla bilaterally.  No right breast mass noted.  Patient with thickened tissue under the left inframammary area that is uncomfortable to palpation.  This areas just above the rib/inframammary/left breast.   PELVIC: Normal external female genitalia.  Left labia minora vaginal skin tag/excess tissue (small) noted.  Normal hair distribution. Adequate perineal body, normal urethral meatus. Vagina mildly atrophic without lesions or discharge. No significant cystocele or rectocele. Uterus and cervix surgically absent. Bimanual exam revealed no masses, tenderness or abnormality.  ANUS: Normal.    Diagnosis:  1. Encounter for gynecological examination with abnormal finding    2. Skin tag of labia    3. Lump in lower outer quadrant of left breast    4. Menopausal symptoms    5. Breast cancer screening by mammogram    6. Visit for screening mammogram      PLAN:    Orders Placed This Encounter    Mammo Digital Diagnostic Left with [facility]    US Breast Left Complete    Mammo Digital Screening Bilat w/ [facility]    estradioL (ESTRACE) 2 MG tablet       Patient was counseled today on postmenopausal issues.  Risk and benefits continued ERT use reviewed.  Patient verbalized understanding of this discussion.  ERx placed today.    Will proceed with breast sonogram.  Per radiology protocols diagnostic  mammogram should be ordered with breast sonogram.  Screening mammogram also ordered (to be determined by radiology recommendations).    Patient instructed to contact office if left labia minora skin tag removal desired.    Follow up in about 1 year (around 3/30/2024) for Annual exam.     Tr Roberts IV, MD

## 2023-03-31 ENCOUNTER — TELEPHONE (OUTPATIENT)
Dept: SPORTS MEDICINE | Facility: CLINIC | Age: 48
End: 2023-03-31
Payer: COMMERCIAL
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
## 2023-04-03 ENCOUNTER — OFFICE VISIT (OUTPATIENT)
Dept: SPORTS MEDICINE | Facility: CLINIC | Age: 48
End: 2023-04-03
Payer: COMMERCIAL

## 2023-04-03 ENCOUNTER — HOSPITAL ENCOUNTER (OUTPATIENT)
Dept: RADIOLOGY | Facility: HOSPITAL | Age: 48
Discharge: HOME | End: 2023-04-03
Attending: PHYSICIAN ASSISTANT
Payer: COMMERCIAL

## 2023-04-03 VITALS — WEIGHT: 245 LBS | BODY MASS INDEX: 36.71 KG/M2

## 2023-04-03 DIAGNOSIS — M25.461 EFFUSION OF RIGHT KNEE: ICD-10-CM

## 2023-04-03 DIAGNOSIS — M25.561 RIGHT KNEE PAIN, UNSPECIFIED CHRONICITY: ICD-10-CM

## 2023-04-03 DIAGNOSIS — M25.561 ACUTE PAIN OF RIGHT KNEE: Primary | ICD-10-CM

## 2023-04-03 PROCEDURE — 73564 X-RAY EXAM KNEE 4 OR MORE: CPT | Mod: TC,50

## 2023-04-03 PROCEDURE — 99999 PR PBB SHADOW E&M-EST. PATIENT-LVL IV: ICD-10-PCS | Mod: PBBFAC,,, | Performed by: PHYSICIAN ASSISTANT

## 2023-04-03 PROCEDURE — 1159F MED LIST DOCD IN RCRD: CPT | Mod: CPTII,S$GLB,, | Performed by: PHYSICIAN ASSISTANT

## 2023-04-03 PROCEDURE — 99214 PR OFFICE/OUTPT VISIT, EST, LEVL IV, 30-39 MIN: ICD-10-PCS | Mod: S$GLB,,, | Performed by: PHYSICIAN ASSISTANT

## 2023-04-03 PROCEDURE — 3008F PR BODY MASS INDEX (BMI) DOCUMENTED: ICD-10-PCS | Mod: CPTII,S$GLB,, | Performed by: PHYSICIAN ASSISTANT

## 2023-04-03 PROCEDURE — 99214 OFFICE O/P EST MOD 30 MIN: CPT | Mod: S$GLB,,, | Performed by: PHYSICIAN ASSISTANT

## 2023-04-03 PROCEDURE — 1160F PR REVIEW ALL MEDS BY PRESCRIBER/CLIN PHARMACIST DOCUMENTED: ICD-10-PCS | Mod: CPTII,S$GLB,, | Performed by: PHYSICIAN ASSISTANT

## 2023-04-03 PROCEDURE — 73564 X-RAY EXAM KNEE 4 OR MORE: CPT | Mod: 26,,, | Performed by: RADIOLOGY

## 2023-04-03 PROCEDURE — 73564 XR KNEE ORTHO BILAT WITH FLEXION: ICD-10-PCS | Mod: 26,,, | Performed by: RADIOLOGY

## 2023-04-03 PROCEDURE — 4010F ACE/ARB THERAPY RXD/TAKEN: CPT | Mod: CPTII,S$GLB,, | Performed by: PHYSICIAN ASSISTANT

## 2023-04-03 PROCEDURE — 1160F RVW MEDS BY RX/DR IN RCRD: CPT | Mod: CPTII,S$GLB,, | Performed by: PHYSICIAN ASSISTANT

## 2023-04-03 PROCEDURE — 99999 PR PBB SHADOW E&M-EST. PATIENT-LVL IV: CPT | Mod: PBBFAC,,, | Performed by: PHYSICIAN ASSISTANT

## 2023-04-03 PROCEDURE — 3008F BODY MASS INDEX DOCD: CPT | Mod: CPTII,S$GLB,, | Performed by: PHYSICIAN ASSISTANT

## 2023-04-03 PROCEDURE — 4010F PR ACE/ARB THEARPY RXD/TAKEN: ICD-10-PCS | Mod: CPTII,S$GLB,, | Performed by: PHYSICIAN ASSISTANT

## 2023-04-03 PROCEDURE — 1159F PR MEDICATION LIST DOCUMENTED IN MEDICAL RECORD: ICD-10-PCS | Mod: CPTII,S$GLB,, | Performed by: PHYSICIAN ASSISTANT

## 2023-04-03 RX ORDER — CELECOXIB 200 MG/1
200 CAPSULE ORAL 2 TIMES DAILY
Qty: 60 CAPSULE | Refills: 2 | Status: SHIPPED | OUTPATIENT
Start: 2023-04-03 | End: 2023-09-05
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
## 2023-04-05 ENCOUNTER — PATIENT MESSAGE (OUTPATIENT)
Dept: OBSTETRICS AND GYNECOLOGY | Facility: CLINIC | Age: 48
End: 2023-04-05
Payer: COMMERCIAL

## 2023-04-12 ENCOUNTER — OFFICE VISIT (OUTPATIENT)
Dept: SPORTS MEDICINE | Facility: CLINIC | Age: 48
End: 2023-04-12
Payer: COMMERCIAL

## 2023-04-12 VITALS — HEIGHT: 69 IN | WEIGHT: 245 LBS | BODY MASS INDEX: 36.29 KG/M2

## 2023-04-12 DIAGNOSIS — M25.561 CHRONIC PAIN OF RIGHT KNEE: Primary | ICD-10-CM

## 2023-04-12 DIAGNOSIS — M17.12 PRIMARY OSTEOARTHRITIS OF LEFT KNEE: ICD-10-CM

## 2023-04-12 DIAGNOSIS — G89.29 CHRONIC PAIN OF RIGHT KNEE: Primary | ICD-10-CM

## 2023-04-12 DIAGNOSIS — M17.11 PRIMARY OSTEOARTHRITIS OF RIGHT KNEE: ICD-10-CM

## 2023-04-12 DIAGNOSIS — M94.261 CHONDROMALACIA OF RIGHT KNEE: ICD-10-CM

## 2023-04-12 DIAGNOSIS — M22.41 CHONDROMALACIA PATELLAE OF RIGHT KNEE: ICD-10-CM

## 2023-04-12 PROCEDURE — 4010F ACE/ARB THERAPY RXD/TAKEN: CPT | Mod: CPTII,S$GLB,, | Performed by: PHYSICIAN ASSISTANT

## 2023-04-12 PROCEDURE — 1160F RVW MEDS BY RX/DR IN RCRD: CPT | Mod: CPTII,S$GLB,, | Performed by: PHYSICIAN ASSISTANT

## 2023-04-12 PROCEDURE — 1159F MED LIST DOCD IN RCRD: CPT | Mod: CPTII,S$GLB,, | Performed by: PHYSICIAN ASSISTANT

## 2023-04-12 PROCEDURE — 4010F PR ACE/ARB THEARPY RXD/TAKEN: ICD-10-PCS | Mod: CPTII,S$GLB,, | Performed by: PHYSICIAN ASSISTANT

## 2023-04-12 PROCEDURE — 99999 PR PBB SHADOW E&M-EST. PATIENT-LVL IV: ICD-10-PCS | Mod: PBBFAC,,, | Performed by: PHYSICIAN ASSISTANT

## 2023-04-12 PROCEDURE — 3008F PR BODY MASS INDEX (BMI) DOCUMENTED: ICD-10-PCS | Mod: CPTII,S$GLB,, | Performed by: PHYSICIAN ASSISTANT

## 2023-04-12 PROCEDURE — 3008F BODY MASS INDEX DOCD: CPT | Mod: CPTII,S$GLB,, | Performed by: PHYSICIAN ASSISTANT

## 2023-04-12 PROCEDURE — 99214 OFFICE O/P EST MOD 30 MIN: CPT | Mod: S$GLB,,, | Performed by: PHYSICIAN ASSISTANT

## 2023-04-12 PROCEDURE — 1159F PR MEDICATION LIST DOCUMENTED IN MEDICAL RECORD: ICD-10-PCS | Mod: CPTII,S$GLB,, | Performed by: PHYSICIAN ASSISTANT

## 2023-04-12 PROCEDURE — 99999 PR PBB SHADOW E&M-EST. PATIENT-LVL IV: CPT | Mod: PBBFAC,,, | Performed by: PHYSICIAN ASSISTANT

## 2023-04-12 PROCEDURE — 99214 PR OFFICE/OUTPT VISIT, EST, LEVL IV, 30-39 MIN: ICD-10-PCS | Mod: S$GLB,,, | Performed by: PHYSICIAN ASSISTANT

## 2023-04-12 PROCEDURE — 1160F PR REVIEW ALL MEDS BY PRESCRIBER/CLIN PHARMACIST DOCUMENTED: ICD-10-PCS | Mod: CPTII,S$GLB,, | Performed by: PHYSICIAN ASSISTANT

## 2023-04-12 NOTE — PROGRESS NOTES
Subjective:          Chief Complaint: Ninfa Aguilar is a 47 y.o. female who had concerns including Pain of the Right Knee.    Patient presents to clinic with for right knee MRI results review. She states that pain increased after riding a stationary bike for 30 minutes and stretching her right quad with a theraband approximately 3 weeks ago. She noticed increased swelling in her right knee the following day. She has been taking Ibuprofen 800mg once daily prn pain.  Pain is located along the medial/lateral joint line as well as superior patella. She also recalls hitting her right patella on a car door. Pain today is 5/10. She recently had bilateral Durolane injections on 12/29/22 with significant relief of pain and improvement of function until this recent pain began. Denies instability. Reports occasional catching.  Pain increases with any activity.         Pain  Associated symptoms include joint swelling. Pertinent negatives include no abdominal pain, chest pain, chills, congestion, coughing, fever, headaches, myalgias, nausea, numbness, rash, sore throat or vomiting.        Review of Systems   Constitutional: Negative. Negative for chills, fever, weight gain and weight loss.   HENT:  Negative for congestion and sore throat.    Eyes:  Negative for blurred vision and double vision.   Cardiovascular:  Negative for chest pain, leg swelling and palpitations.   Respiratory:  Negative for cough and shortness of breath.    Hematologic/Lymphatic: Does not bruise/bleed easily.   Skin:  Negative for itching, poor wound healing and rash.   Musculoskeletal:  Positive for joint pain, joint swelling and stiffness. Negative for back pain, muscle weakness and myalgias.   Gastrointestinal:  Negative for abdominal pain, constipation, diarrhea, nausea and vomiting.   Genitourinary: Negative.  Negative for frequency and hematuria.   Neurological:  Negative for dizziness, headaches, numbness, paresthesias and sensory  change.   Psychiatric/Behavioral:  Negative for altered mental status and depression. The patient is not nervous/anxious.    Allergic/Immunologic: Negative for hives.                 Objective:        General: Ninfa is well-developed, well-nourished, appears stated age, in no acute distress, alert and oriented to time, place and person.     General    Vitals reviewed.  Constitutional: She is oriented to person, place, and time. She appears well-developed and well-nourished. No distress.   HENT:   Head: Normocephalic and atraumatic.   Eyes: EOM are normal.   Cardiovascular:  Normal rate and regular rhythm.            Pulmonary/Chest: Effort normal. No respiratory distress.   Neurological: She is alert and oriented to person, place, and time. She has normal reflexes. No cranial nerve deficit. Coordination normal.   Psychiatric: She has a normal mood and affect. Her behavior is normal. Judgment and thought content normal.     General Musculoskeletal Exam   Gait: abnormal and antalgic       Right Knee Exam     Inspection   Erythema: absent  Scars: absent  Swelling: present  Effusion: present  Deformity: absent  Bruising: absent    Tenderness   The patient is tender to palpation of the medial joint line, lateral joint line and patella.    Range of Motion   Extension:  5 abnormal   Flexion:  120 abnormal     Tests   Meniscus   King:  Medial - positive Lateral - negative  Ligament Examination   Lachman: normal (-1 to 2mm)   PCL-Posterior Drawer: normal (0 to 2mm)     MCL - Valgus: normal (0 to 2mm)  LCL - Varus: normal  Pivot Shift: normal (Equal)  Reverse Pivot Shift: normal (Equal)  Posterolateral Corner: stable  Patella   Passive Patellar Tilt: neutral  Patellar Tracking: normal  Patellar Glide (quadrants): Lateral - 1   Medial - 2  Patellar Grind: negative    Other   Sensation: normal    Left Knee Exam     Inspection   Erythema: absent  Scars: absent  Swelling: absent  Effusion: absent  Deformity:  absent  Bruising: absent    Tenderness   The patient tender to palpation of the medial joint line.    Range of Motion   Extension:  0 normal   Flexion:  130 normal     Tests   Meniscus   King:  Medial - negative Lateral - negative  Stability   Lachman: normal (-1 to 2mm)   PCL-Posterior Drawer: normal (0 to 2mm)  MCL - Valgus: normal (0 to 2mm)  LCL - Varus: normal (0 to 2mm)  Pivot Shift: normal (Equal)  Reverse Pivot Shift: normal (Equal)  Posterolateral Corner: stable  Patella   Passive Patellar Tilt: neutral  Patellar Tracking: normal  Patellar Glide (Quadrants): Lateral - 1 Medial - 2  Patellar Grind: negative    Other   Sensation: normal    Muscle Strength   Right Lower Extremity   Hip Abduction: 5/5   Quadriceps:  5/5   Hamstrin/5   Left Lower Extremity   Hip Abduction: 5/5   Quadriceps:  5/5   Hamstrin/5     Reflexes     Left Side  Achilles:  2+  Quadriceps:  2+    Right Side   Achilles:  2+  Quadriceps:  2+    Vascular Exam     Right Pulses  Dorsalis Pedis:      2+  Posterior Tibial:      2+        Left Pulses  Dorsalis Pedis:      2+  Posterior Tibial:      2+      RADIOGRAPHS:  4/3/23  Bilateral knees:  FINDINGS:  Mild DJD.  No significant joint space narrowing.  No fracture or dislocation.  No bone destruction identified    MRI right knee: 4/10/23  FINDINGS:  The ACL and PCL appear intact.  The medial and lateral menisci appear intact.  The MCL and lateral collateral ligament complex appear intact.  The distal quadriceps tendon demonstrates mild greater than expected striated increased T2 signal medially which could reflect mild tendinosis.  The proximal central patellar tendon also demonstrates greater than expected intermediate increased T2 signal suggestive of tendinosis.  Hoffa's fat pad appears to be grossly normal in signal intensity.     Small knee joint effusion is seen.  There appears to be evidence of severe cartilage thinning with fissuring along the median patellar ridge and  moderate underlying subchondral edema with possible tiny sub chondral cysts.  Moderate cartilage thinning along the medial and lateral patellar facets is seen with cartilage signal heterogeneity and fibrillation.  There appears to be evidence of signal heterogeneity and fissuring along the central trochlea which appears to have progressed from the prior study.  There is evidence of mild to moderate cartilage thinning with fibrillation and fissuring along the weight-bearing medial femoral condyle.  Tiny subcortical cyst or minimal reactive subchondral marrow edema along the weight-bearing medial femoral condyle is seen.  Mild to moderate cartilage thinning along the weight-bearing medial tibial plateau is seen.  Mild cartilage thinning in the weight-bearing lateral compartment is seen with subtle cartilage signal heterogeneity along the weight-bearing lateral tibial plateau.  There appears to be evidence of cartilage signal heterogeneity and fibrillation/fissuring along the posterior flexion zone of the medial femoral condyle.  No suspicious bone marrow edema suggestive of acute fracture is visualized.  Nonspecific subcutaneous edema along the anterior predominant knee is noted.  No significant Walters's cyst is seen.        Assessment:       Encounter Diagnoses   Name Primary?    Chronic pain of right knee Yes    Chondromalacia of right knee     Chondromalacia patellae of right knee     Primary osteoarthritis of right knee     Primary osteoarthritis of left knee             Plan:       1. I made the decision to obtain old records of the patient including previous notes and imaging. New imaging was ordered today of the extremity or extremities evaluated. I independently reviewed and interpreted the radiographs and/or MRIs today as well as prior imaging.     2. Celebrex 200mg BID. No additional NSAIDS.    3. Ice/rest/elevate    4. We have discussed a variety of treatment options including medications, injections,  physical therapy and other alternative treatments. I also explained the indications, risks and benefits of surgery.  Patient's pain is refractory HEP, conservative management, and NSAIDs. Pt would like to proceed with brace and visco-supplementation.    Medical Necessity for viscosupplementation use: After thorough evaluation of the patient, I have determined that viscosupplementation treatment is medically necessary. 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 Oral analgesics including NSAIDs have not adequately controlled the patient's symptoms. There is radiographic evidence of Kellgren-Godwin grade II (or greater) osteoarthritic (OA) changes, or if lack of radiographic evidence, there is arthroscopic or other evidence of chondrosis of the knee(s).      Pre-authorization placed for bilateral  Durolane  injections.    4. [id number] Vanita Jackson, performed a custom orthotic / brace adjustment, fitting and training with the patient. The patient demonstrated understanding and proper care. This was performed for 15 minutes. Visco skin     5. RTC to see Juliette Cheney PA-C in 3 months for bilateral  Durolane  injections.    All of the patient's questions were answered and the patient will contact us if they have any questions or concerns in the interim.                        Patient questionnaires may have been collected.

## 2023-05-17 ENCOUNTER — OFFICE VISIT (OUTPATIENT)
Dept: ORTHOPEDICS | Facility: CLINIC | Age: 48
End: 2023-05-17
Payer: COMMERCIAL

## 2023-05-17 ENCOUNTER — HOSPITAL ENCOUNTER (OUTPATIENT)
Dept: RADIOLOGY | Facility: HOSPITAL | Age: 48
Discharge: HOME | End: 2023-05-17
Attending: PHYSICIAN ASSISTANT
Payer: COMMERCIAL

## 2023-05-17 DIAGNOSIS — M54.2 NECK PAIN: Primary | ICD-10-CM

## 2023-05-17 DIAGNOSIS — M50.30 DDD (DEGENERATIVE DISC DISEASE), CERVICAL: ICD-10-CM

## 2023-05-17 PROCEDURE — 1159F PR MEDICATION LIST DOCUMENTED IN MEDICAL RECORD: ICD-10-PCS | Mod: CPTII,S$GLB,, | Performed by: PHYSICIAN ASSISTANT

## 2023-05-17 PROCEDURE — 99214 OFFICE O/P EST MOD 30 MIN: CPT | Mod: S$GLB,,, | Performed by: PHYSICIAN ASSISTANT

## 2023-05-17 PROCEDURE — 72050 X-RAY EXAM NECK SPINE 4/5VWS: CPT | Mod: TC

## 2023-05-17 PROCEDURE — 99999 PR PBB SHADOW E&M-EST. PATIENT-LVL III: ICD-10-PCS | Mod: PBBFAC,,, | Performed by: PHYSICIAN ASSISTANT

## 2023-05-17 PROCEDURE — 4010F ACE/ARB THERAPY RXD/TAKEN: CPT | Mod: CPTII,S$GLB,, | Performed by: PHYSICIAN ASSISTANT

## 2023-05-17 PROCEDURE — 99999 PR PBB SHADOW E&M-EST. PATIENT-LVL III: CPT | Mod: PBBFAC,,, | Performed by: PHYSICIAN ASSISTANT

## 2023-05-17 PROCEDURE — 72050 XR CERVICAL SPINE AP LAT WITH FLEX EXTEN: ICD-10-PCS | Mod: 26,,, | Performed by: RADIOLOGY

## 2023-05-17 PROCEDURE — 72050 X-RAY EXAM NECK SPINE 4/5VWS: CPT | Mod: 26,,, | Performed by: RADIOLOGY

## 2023-05-17 PROCEDURE — 4010F PR ACE/ARB THEARPY RXD/TAKEN: ICD-10-PCS | Mod: CPTII,S$GLB,, | Performed by: PHYSICIAN ASSISTANT

## 2023-05-17 PROCEDURE — 99214 PR OFFICE/OUTPT VISIT, EST, LEVL IV, 30-39 MIN: ICD-10-PCS | Mod: S$GLB,,, | Performed by: PHYSICIAN ASSISTANT

## 2023-05-17 PROCEDURE — 1159F MED LIST DOCD IN RCRD: CPT | Mod: CPTII,S$GLB,, | Performed by: PHYSICIAN ASSISTANT

## 2023-05-17 NOTE — PROGRESS NOTES
DATE: 5/17/2023  PATIENT: Ninfa Aguilar    Attending Physician: Mane Hough M.D.    HISTORY:  Ninfa Aguilar is a 47 y.o. female who returns to me today for follow up of neck pain.  She was last seen by me 12/22/2021.  Today she is doing well but notes when I saw her last we ordered a cervical RAJAT but she ended up having this done by a physician in [place].  She says that injection gave her relief for about a year.  Recently she has had increased right sided neck pain. She takes gabapentin and celebrex.  She went to PT 2 years ago and says it was beneficial.     The Patient denies myelopathic symptoms such as handwriting changes or difficulty with buttons/coins/keys. Denies perineal paresthesias, bowel/bladder dysfunction.    PMH/PSH/FamHx/SocHx:  Unchanged from prior visit        EXAM:  LMP 01/15/2017     Physical exam stable. Neuro exam stable.     IMAGING:    Today I personally re-reviewed AP, Lat and Flex/Ex  upright C-spine films that demonstrate mild narrowing at C5/6. No instability.      MRI cervical spine from 2021 demonstrates foraminal narrowing at C5/6. No significant spinal stenosis.     There is no height or weight on file to calculate BMI.    Hemoglobin A1C   Date Value Ref Range Status   11/22/2016 5.6 4.5 - 6.2 % Final     Comment:     According to ADA guidelines, hemoglobin A1C <7.0% represents  optimal control in non-pregnant diabetic patients.  Different  metrics may apply to specific populations.   Standards of Medical Care in Diabetes - 2016.  For the purpose of screening for the presence of diabetes:  <5.7%     Consistent with the absence of diabetes  5.7-6.4%  Consistent with increasing risk for diabetes   (prediabetes)  >or=6.5%  Consistent with diabetes  Currently no consensus exists for use of hemoglobin A1C  for diagnosis of diabetes for children.     08/25/2010 5.6 4.0 - 6.2 % Final         ASSESSMENT/PLAN:    Ninfa was seen today for neck pain.    Diagnoses and  all orders for this visit:    Neck pain  -     Ambulatory referral/consult to Physical/Occupational Therapy; Future        She does not feel like she is ready for another injection yet. She would like to return to PT. Orders placed today.

## 2023-05-18 ENCOUNTER — TELEPHONE (OUTPATIENT)
Dept: SPORTS MEDICINE | Facility: CLINIC | Age: 48
End: 2023-05-18
Payer: COMMERCIAL

## 2023-05-18 NOTE — TELEPHONE ENCOUNTER
Called the patient to inform her that the preferred visco gel injection brand has changed with her insurance company. While on the call I informed the patient that SynviscOne is a single injection and it's listed as a preferred option. The patient stated she get the injection brand that her insurance company will approve.

## 2023-05-22 DIAGNOSIS — M17.11 PRIMARY OSTEOARTHRITIS OF RIGHT KNEE: Primary | ICD-10-CM

## 2023-05-22 DIAGNOSIS — M17.12 PRIMARY OSTEOARTHRITIS OF LEFT KNEE: ICD-10-CM

## 2023-06-05 ENCOUNTER — PATIENT MESSAGE (OUTPATIENT)
Dept: SPORTS MEDICINE | Facility: CLINIC | Age: 48
End: 2023-06-05
Payer: COMMERCIAL

## 2023-06-29 ENCOUNTER — OFFICE VISIT (OUTPATIENT)
Dept: SPORTS MEDICINE | Facility: CLINIC | Age: 48
End: 2023-06-29
Payer: COMMERCIAL

## 2023-06-29 ENCOUNTER — HOSPITAL ENCOUNTER (OUTPATIENT)
Dept: RADIOLOGY | Facility: HOSPITAL | Age: 48
Discharge: HOME | End: 2023-06-29
Attending: OBSTETRICS & GYNECOLOGY
Payer: COMMERCIAL

## 2023-06-29 VITALS
HEART RATE: 55 BPM | SYSTOLIC BLOOD PRESSURE: 125 MMHG | WEIGHT: 248.88 LBS | DIASTOLIC BLOOD PRESSURE: 72 MMHG | HEIGHT: 69 IN | BODY MASS INDEX: 36.86 KG/M2

## 2023-06-29 DIAGNOSIS — M17.11 PRIMARY OSTEOARTHRITIS OF RIGHT KNEE: Primary | ICD-10-CM

## 2023-06-29 DIAGNOSIS — Z12.31 VISIT FOR SCREENING MAMMOGRAM: ICD-10-CM

## 2023-06-29 DIAGNOSIS — M17.12 PRIMARY OSTEOARTHRITIS OF LEFT KNEE: ICD-10-CM

## 2023-06-29 PROCEDURE — 77067 SCR MAMMO BI INCL CAD: CPT | Mod: 26,,, | Performed by: RADIOLOGY

## 2023-06-29 PROCEDURE — 99499 NO LOS: ICD-10-PCS | Mod: S$GLB,,, | Performed by: PHYSICIAN ASSISTANT

## 2023-06-29 PROCEDURE — 20610 DRAIN/INJ JOINT/BURSA W/O US: CPT | Mod: 50,S$GLB,, | Performed by: PHYSICIAN ASSISTANT

## 2023-06-29 PROCEDURE — 99999 PR PBB SHADOW E&M-EST. PATIENT-LVL IV: ICD-10-PCS | Mod: PBBFAC,,, | Performed by: PHYSICIAN ASSISTANT

## 2023-06-29 PROCEDURE — 77067 MAMMO DIGITAL SCREENING BILAT WITH TOMO: ICD-10-PCS | Mod: 26,,, | Performed by: RADIOLOGY

## 2023-06-29 PROCEDURE — 77063 MAMMO DIGITAL SCREENING BILAT WITH TOMO: ICD-10-PCS | Mod: 26,,, | Performed by: RADIOLOGY

## 2023-06-29 PROCEDURE — 77063 BREAST TOMOSYNTHESIS BI: CPT | Mod: 26,,, | Performed by: RADIOLOGY

## 2023-06-29 PROCEDURE — 77067 SCR MAMMO BI INCL CAD: CPT | Mod: TC

## 2023-06-29 PROCEDURE — 20610 PR DRAIN/INJECT LARGE JOINT/BURSA: ICD-10-PCS | Mod: 50,S$GLB,, | Performed by: PHYSICIAN ASSISTANT

## 2023-06-29 PROCEDURE — 99999 PR PBB SHADOW E&M-EST. PATIENT-LVL IV: CPT | Mod: PBBFAC,,, | Performed by: PHYSICIAN ASSISTANT

## 2023-06-29 PROCEDURE — 99499 UNLISTED E&M SERVICE: CPT | Mod: S$GLB,,, | Performed by: PHYSICIAN ASSISTANT

## 2023-06-29 NOTE — PROGRESS NOTES
Patient is here for follow up of bilateral knee arthritis. Pt is requesting bilateral Synvisc one injections.  [facility] Has failed other conservative modalities including NSAIDS, activity modification, weight loss.    The prior shot was tolerated well.    PHYSICAL EXAMINATION:     General: The patient is alert and oriented x 3. Mood is pleasant.   Observation of ears, eyes and nose reveals no gross abnormalities. No   labored breathing observed.     No signs of infection or adverse reaction to knee.    PROCEDURE NOTE:  Injection Procedure bilateral knees  A time out was performed, including verification of patient ID, procedure, site and side, availability of information and equipment, review of safety issues, and agreement with consent, the procedure site was marked.    After time out was performed, the patient was prepped aseptically with povidone-iodine swabsticks. A diagnostic and therapeutic injection of 6cc Synvisc one was given under sterile technique using a 22g x 1.5 needle from the Superolateral  aspect of the bilateral Knee Joint in the supine position.      Ninfa Aguilar had no adverse reactions to the medication. Pain decreased. She was instructed to apply ice to the joint for 20 minutes and avoid strenuous activities for 24-36 hours following the injection. She was warned of possible blood sugar and/or blood pressure changes during that time. Following that time, she can resume regular activities.    She was reminded to call the clinic immediately for any adverse side effects as explained in clinic today.    RTC in 6 months with Juliette Cheney PA-C for follow up bilateral knees and possible repeat visco supplementation.  All questions were answered, pt will contact us for questions or concerns in the interim.

## 2023-07-17 DIAGNOSIS — N95.2 VAGINAL ATROPHY: Primary | ICD-10-CM

## 2023-07-19 RX ORDER — ESTRADIOL 0.1 MG/G
CREAM VAGINAL
Qty: 42.5 G | Refills: 4 | Status: SHIPPED | OUTPATIENT
Start: 2023-07-19 | End: 2024-02-26

## 2023-09-01 DIAGNOSIS — M25.461 EFFUSION OF RIGHT KNEE: ICD-10-CM

## 2023-09-01 DIAGNOSIS — M25.561 ACUTE PAIN OF RIGHT KNEE: ICD-10-CM

## 2023-09-05 RX ORDER — CELECOXIB 200 MG/1
200 CAPSULE ORAL 2 TIMES DAILY
Qty: 60 CAPSULE | Refills: 2 | Status: SHIPPED | OUTPATIENT
Start: 2023-09-05

## 2023-11-08 ENCOUNTER — TELEPHONE (OUTPATIENT)
Dept: PSYCHIATRY | Facility: CLINIC | Age: 48
End: 2023-11-08
Payer: COMMERCIAL

## 2023-11-08 NOTE — TELEPHONE ENCOUNTER
Pt called to schedule an appt for therapy. Asked if she would like next available or to wait for Dr. Singh. She said she would accept next available provider, aware that wait list is more than 6 months out and additional resources will be sent. Patient stated it was recommended that she have services, she doesn't feel she needs them at this time, but would like to be added to wait list in case things change. Information sent to MA that handles work queue.

## 2023-11-09 ENCOUNTER — PATIENT MESSAGE (OUTPATIENT)
Dept: PSYCHIATRY | Facility: CLINIC | Age: 48
End: 2023-11-09
Payer: COMMERCIAL

## 2023-11-29 ENCOUNTER — HOSPITAL ENCOUNTER (OUTPATIENT)
Dept: RADIOLOGY | Facility: HOSPITAL | Age: 48
Discharge: HOME | End: 2023-11-29
Attending: NURSE PRACTITIONER
Payer: COMMERCIAL

## 2023-11-29 LAB
CREAT SERPL-MCNC: 1.1 MG/DL (ref 0.5–1.4)
SAMPLE: NORMAL

## 2023-11-29 PROCEDURE — A9585 GADOBUTROL INJECTION: HCPCS | Mod: PO | Performed by: NURSE PRACTITIONER

## 2023-11-29 PROCEDURE — 25500020 PHARM REV CODE 255: Mod: PO | Performed by: NURSE PRACTITIONER

## 2023-11-29 PROCEDURE — 72156 MRI NECK SPINE W/O & W/DYE: CPT | Mod: TC,PO

## 2023-11-29 PROCEDURE — 82565 ASSAY OF CREATININE: CPT | Mod: PO

## 2023-11-29 PROCEDURE — 70553 MRI BRAIN STEM W/O & W/DYE: CPT | Mod: TC,PO

## 2023-11-29 RX ORDER — GADOBUTROL 604.72 MG/ML
11.5 INJECTION INTRAVENOUS
Status: COMPLETED | OUTPATIENT
Start: 2023-11-29 | End: 2023-11-29

## 2023-11-29 RX ADMIN — GADOBUTROL 11.5 ML: 604.72 INJECTION INTRAVENOUS at 02:11

## 2023-12-05 NOTE — PROGRESS NOTES
"Outpatient Psychotherapy Initial Visit  12/11/2023    History of Presenting Illness:    Pt is a 49y/o Female referred by Margarito Kamara NP for Depression.  Patient was seen, examined and chart was reviewed. Patient reviewed and agreed to informed consent and limits of confidentiality.    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 Pt dicussed how her in laws fight a lot. Pt discussed intense fighting and tension with sister-in-law;  has 10 siblings.     Pt discussed feeling overwhelmed by mother-in-law as she is demanding and difficult to deal with personally and professionally. Pt discusses difficulties setting boundaries with 's family.     Pt discussed how her mother-in-law has Cancer and currently in the hospital. Pt discussed how her mother-in-law currently lives in [place] and won't move to [place] due to people in the area she does not want to see.     Pt reported her and 's family are proud Jehovah Witness in a tight-knit community.     Pt reported she has been with her  for 31 years. Pt reported they got  out of high school and "I been working for his family ever since." Pt discussed how she runs household and becomes frustrated as she feels  is lazy. Pt reported she has 2 children who live at home: son age 29 who is a CPA working from home and daughter age 26 who is on the Autism Spectrum.     Pt discussed how her daughter is dating someone in [place]; he stole a lot of money from his father's investment business and was charged with a ponzi scheme. Pt reported that he is a [employer] Witness, has been  2x, and has been in penitentiary for 14 years. Pt discussed how she struggles with the relationship as this is her daughter's first relationship but has accepted him as daughter appears happy.     Pt stated her 's brother killed himself;  found him.     Pt reported she recently " "began experiencing thunderclap headaches and seeing many doctors.     Pt discussed how she had a close friend but realized she was a narcissist who betrayed pt's trust.      Pt reported she like to drink a glass of wine to relax and occasionally will "have one to many but I don't have a problem."     Pt confirmed taking psychiatric medications of Xanax 1mg for Anxiety, Wellbutrin XL 150mg daily for depression prescribed by Yahaira Almanza MD.      Current symptoms:  Depression: dysphoric mood, fatigue, and difficulty concentrating.  Anxiety: excessive worrying, restlessness, and muscle tension.  Sleep:  sleep well with CPAP machine .  [place]:  denies.  Psychosis: denies .    Engaged in rapport building, psychoeducation, and goal setting.  Pt goals are to improve self-care, enhance coping skills, improve stress management, improve setting boundaries. Pt would benefit from behavior modification, insight oriented, interactive, and supportive therapies.  Pt receptive to psychotherapy. Assisted with scheduling follow ups.    Suicidal Ideation and Risk:   Pt denied current or history of related symptoms: yes    [place]-Suicide Severity Rating Scale  In the last two weeks     1. Wish to be Dead: Have you ever wished you were dead or not alive anymore, or wish to fall asleep and not wake up?: No  2. Suicidal Thoughts: Have you had any thoughts of killing yourself?: No  3. Suicidal Thoughts with Method (withoutSpecific Plan or Intent to Act): Have you been thinking about how you might kill yourself? : No  4. Suicidal Intent (without Specific Plan): Have you had these thoughts and had some intention of acting on them?: No  5. Suicide Intent with Specific Plan: Have you started to work out or worked out the details of how to kill yourself? Do you intend to carry out this plan?: No  6. Suicide Behavior Question: Have you ever done anything, started to do anything, or prepare to do anything to end your life?: No  If "Yes" " to question 6: How long ago did you do any of these?: Between a week and a year ago? No        Homicidal/Violent Ideation and Risk:   Pt denied current or history of related symptoms: yes      PSYCHOSOCIAL AND ENVIRONMENTAL STRESSORS:  Family Stress  Work Stress    Clinical Assessment:   Identified symptoms to address in tx:   Depression  Anxiety    Ability to adhere to plan:  cooperative    Rationale for employing these interactive techniques: Applicable to diagnosis     Diagnosis(es):   1. Depression, unspecified depression type  Ambulatory referral/consult to Psychiatry      2. Anxiety disorder, unspecified type        3. Situational stress                Plan   Treatment Goals:  Specify outcomes written in observable, behavioral terms:   Anxiety: acquiring relapse prevention skills, reducing negative automatic thoughts, reducing physical symptoms of anxiety, and reducing time spent worrying (<30 minutes/day)  Depression: acquiring relapse prevention skills, increasing energy, increasing motivation, increasing self-reward for positive behaviors (one/day), and reducing fatigue    Treatment Plan/Recommendations:   Medication Management: Continue current medications.  The treatment plan and follow up plan were reviewed with the patient.           Pt is to attend supportive psychotherapy sessions.     This author reviewed limits to confidentiality and this author's collaboration with pt's physician. Pt indicated understanding and denied any questions.    Return to Clinic: as scheduled  Counseling time: 45    -Call to report any worsening of symptoms or problems associated with medication.  - Pt instructed to go to ER if thoughts of harming self or others arise.   -Supportive therapy and psychoeducation provided  -Pt instructed to call clinic, 911 or go to nearest emergency room if sxs worsen or pt is in crisis. The pt expresses understanding.     Each patient to whom he or she provides medical services by  telemedicine is:  (1) informed of the relationship between the physician and patient and the respective role of any other health care provider with respect to management of the patient; and (2) notified that he or she may decline to receive medical services by telemedicine and may withdraw from such care at any time.

## 2023-12-11 ENCOUNTER — OFFICE VISIT (OUTPATIENT)
Dept: PSYCHIATRY | Facility: CLINIC | Age: 48
End: 2023-12-11
Payer: COMMERCIAL

## 2023-12-11 DIAGNOSIS — F32.A DEPRESSION, UNSPECIFIED DEPRESSION TYPE: Primary | ICD-10-CM

## 2023-12-11 DIAGNOSIS — F41.9 ANXIETY DISORDER, UNSPECIFIED TYPE: ICD-10-CM

## 2023-12-11 DIAGNOSIS — F43.9 SITUATIONAL STRESS: ICD-10-CM

## 2023-12-11 PROCEDURE — 3044F HG A1C LEVEL LT 7.0%: CPT | Mod: CPTII,S$GLB,,

## 2023-12-11 PROCEDURE — 90791 PR PSYCHIATRIC DIAGNOSTIC EVALUATION: ICD-10-PCS | Mod: S$GLB,,,

## 2023-12-11 PROCEDURE — 4010F PR ACE/ARB THEARPY RXD/TAKEN: ICD-10-PCS | Mod: CPTII,S$GLB,,

## 2023-12-11 PROCEDURE — 99999 PR PBB SHADOW E&M-EST. PATIENT-LVL II: ICD-10-PCS | Mod: PBBFAC,,,

## 2023-12-11 PROCEDURE — 3044F PR MOST RECENT HEMOGLOBIN A1C LEVEL <7.0%: ICD-10-PCS | Mod: CPTII,S$GLB,,

## 2023-12-11 PROCEDURE — 4010F ACE/ARB THERAPY RXD/TAKEN: CPT | Mod: CPTII,S$GLB,,

## 2023-12-11 PROCEDURE — 90791 PSYCH DIAGNOSTIC EVALUATION: CPT | Mod: S$GLB,,,

## 2023-12-11 PROCEDURE — 99999 PR PBB SHADOW E&M-EST. PATIENT-LVL II: CPT | Mod: PBBFAC,,,

## 2023-12-21 NOTE — PROGRESS NOTES
"Individual Psychotherapy (PhD/LCSW)    12/27/2023    Interim Events/Subjective Report/Content of Current Session:  follow-up appointment.    Pt is a 48 y.o. Female with past psychiatric hx of depression and anxiety who presents for follow-up treatment.    Pt discussed continued stress at work dealing with mother-in-law/boss who is very sick and extremely demanding; "this is taking a toll on me." Pt discussed feeling overwhelmed handling her personal and professional endeavors.     Pt discussed how she has been contemplating quitting working for her mother-in-law. Pt reported she is thinking about applying for SSD due to many medical issues. 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     Pt discussed getting results back from MRI and having extra white matter in brain. Pt discussed being afraid to take medication as pt has taken it before and it caused difficult side effects. Pt discussed having memory issues currently.    Pt discussed feeling a lot of pressure on her from family to help mother-in-law. Pt stated "I don't expect anything from my ." Pt stated he helps pt with mother-in-law sometimes. Pt stated  helped minimally with kids growing up.     Pt reported having many family members in town to celebrate New Years Holiday.     Pt discussed worrying what others think about her daughter's 4 yr relationship with a man in alf even though [facility] is "big on forgiveness;" son in new relationship and pt discussed feeling worried what girlfriend will think when she finds out. Pt stated daughter's boyfriend was moved to a alf in [place], [place].     Discussed importance of self-care and time management.     Current symptoms:  Depression: dysphoric mood, fatigue, difficulty concentrating, and psychomotor retardation .  Anxiety: excessive worrying, restlessness, and muscle tension.  Sleep:  Pt reporting sleeping well using " CPAP machine .  [place]:  denies.  Psychosis: denies .    Therapeutic Intervention/Techniques: insight oriented, interactive, and supportive; relevant to diagnosis, patient responds to this modality    Will continue to follow.   Pt aware to contact sw for any additional needs that may occur prior to next session.      Risk Parameters:  Patient reports no suicidal ideation  Patient reports no homicidal ideation  Patient reports no self-injurious behavior  Patient reports no violent behavior    Diagnosis:   1. Depression, unspecified depression type        2. Anxiety disorder, unspecified type        3. Situational stress            Return to Clinic: as scheduled  Counseling time: 45  -Call to report any worsening of symptoms or problems associated with medication.  - Pt instructed to go to ER if thoughts of harming self or others arise.   -Supportive therapy and psychoeducation provided  -Pt instructed to call clinic, 911 or go to nearest emergency room if sxs worsen or pt is in crisis. The pt expresses understanding.   Each patient to whom he or she provides medical services by telemedicine is:  (1) informed of the relationship between the physician and patient and the respective role of any other health care provider with respect to management of the patient; and (2) notified that he or she may decline to receive medical services by telemedicine and may withdraw from such care at any time.

## 2023-12-27 ENCOUNTER — OFFICE VISIT (OUTPATIENT)
Dept: PSYCHIATRY | Facility: CLINIC | Age: 48
End: 2023-12-27
Payer: COMMERCIAL

## 2023-12-27 DIAGNOSIS — F41.9 ANXIETY DISORDER, UNSPECIFIED TYPE: ICD-10-CM

## 2023-12-27 DIAGNOSIS — F32.A DEPRESSION, UNSPECIFIED DEPRESSION TYPE: Primary | ICD-10-CM

## 2023-12-27 DIAGNOSIS — F43.9 SITUATIONAL STRESS: ICD-10-CM

## 2023-12-27 PROCEDURE — 3044F HG A1C LEVEL LT 7.0%: CPT | Mod: CPTII,S$GLB,,

## 2023-12-27 PROCEDURE — 4010F PR ACE/ARB THEARPY RXD/TAKEN: ICD-10-PCS | Mod: CPTII,S$GLB,,

## 2023-12-27 PROCEDURE — 4010F ACE/ARB THERAPY RXD/TAKEN: CPT | Mod: CPTII,S$GLB,,

## 2023-12-27 PROCEDURE — 99999 PR PBB SHADOW E&M-EST. PATIENT-LVL I: ICD-10-PCS | Mod: PBBFAC,,,

## 2023-12-27 PROCEDURE — 3044F PR MOST RECENT HEMOGLOBIN A1C LEVEL <7.0%: ICD-10-PCS | Mod: CPTII,S$GLB,,

## 2023-12-27 PROCEDURE — 99999 PR PBB SHADOW E&M-EST. PATIENT-LVL I: CPT | Mod: PBBFAC,,,

## 2023-12-27 PROCEDURE — 90834 PR PSYCHOTHERAPY W/PATIENT, 45 MIN: ICD-10-PCS | Mod: S$GLB,,,

## 2023-12-27 PROCEDURE — 90834 PSYTX W PT 45 MINUTES: CPT | Mod: S$GLB,,,
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
## 2024-01-10 ENCOUNTER — OFFICE VISIT (OUTPATIENT)
Dept: PSYCHIATRY | Facility: CLINIC | Age: 49
End: 2024-01-10
Payer: COMMERCIAL

## 2024-01-10 DIAGNOSIS — F41.9 ANXIETY DISORDER, UNSPECIFIED TYPE: ICD-10-CM

## 2024-01-10 DIAGNOSIS — F32.A DEPRESSION, UNSPECIFIED DEPRESSION TYPE: Primary | ICD-10-CM

## 2024-01-10 DIAGNOSIS — F43.9 SITUATIONAL STRESS: ICD-10-CM

## 2024-01-10 PROCEDURE — 99999 PR PBB SHADOW E&M-EST. PATIENT-LVL I: CPT | Mod: PBBFAC,,,

## 2024-01-10 PROCEDURE — 90834 PSYTX W PT 45 MINUTES: CPT | Mod: S$GLB,,,

## 2024-01-17 NOTE — PROGRESS NOTES
"Individual Psychotherapy (PhD/LCSW)    01/24/2024    The patient location is: [address] [place] [id number]   The patient phone number is: [phone]   Visit type: Virtual visit with synchronous audio and video  Each patient to whom he or she provides medical services by telemedicine is:  (1) informed of the relationship between the provider and patient and the respective role of any other health care provider with respect to management of the patient; and (2) notified that he or she may decline to receive medical services by telemedicine and may withdraw from such care at any time.  Crisis Disclaimer: Patient was informed that due to the virtual nature of the visit, that if a crisis develops, protocols will be implemented to ensure patient safety, including but not limited to: 1) Initiating a welfare check with local Law Enforcement, 2) Calling [facility]/National Crisis Hotline, and/or 3) Initiating PEC/CEC procedures.    Interim Events/Subjective Report/Content of Current Session:  follow-up appointment.    Pt is a 48 y.o. Female with past psychiatric hx of depression and anxiety who presents for follow-up treatment.     Pt processed feelings of stress;  is depressed, son's girlfriend broke-up with son, and consistent negativity from mother-in-law. Pt discussed how mother-in-law will begin radiation treatment next week.     Pt reported that her  will never make kids move out; " and I want to set better boundaries with kids though."    Pt reported that  signed up for a class to become a ; pt reported she wants to learn as well.     Pt reported looking forward to going on a cruise to [place] with lots of family in May 2024. Pt discussed how the family might go to [employer] in March 2024.     Pt discussed enjoying spending time with dogs.    Pt discussed struggling with losing weight. Discussed creating S.M.A.R.T goals in regarding to building healthy habits.    Pt " processed feelings towards the Jen defacing the Jehovah Witness pamphlets; pt expressed wanting Jen to receive mental health help.     Pt reported daughter started taking an Accounting course. Pt stated next weekend pt will take daughter to the residential to see boyfriend.     Pt discussed drinking 3 glasses of wine nightly; pt discussed wanting to cut back - not quit. Pt stated she will start with 2 glasses daily. Pt encouraged to add more water daily; pt amenable.    Pt discussed self-care practices of getting hair done and washing face daily. Pt stated she wants to exercise more.     Pt discussed how she has not started journaling yet but plans to do so before bed each night. Pt discussed how she forgets things daily. Pt made a goal to begin journaling tonight.     Current symptoms:  Depression: low mood, insomnia, fatigue and difficulty concentrating.  Anxiety: excessive worrying, restlessness, and muscle tension.  Sleep: frequent night time awakening and non-restful sleep.  [place]:  denies.  Psychosis: denies .    Therapeutic Intervention/Techniques: behavior modification, insight oriented, interactive, and supportive; relevant to diagnosis, patient responds to this modality    Will continue to follow.   Pt aware to contact sw for any additional needs that may occur prior to next session.    Risk Parameters:  Patient reports no suicidal ideation  Patient reports no homicidal ideation  Patient reports no self-injurious behavior  Patient reports no violent behavior    Diagnosis:   1. Depression, unspecified depression type        2. Anxiety disorder, unspecified type        3. Situational stress            Return to Clinic: as scheduled  Counseling time: 45  -Call to report any worsening of symptoms or problems associated with medication.  - Pt instructed to go to ER if thoughts of harming self or others arise.   -Supportive therapy and psychoeducation provided  -Pt instructed to call clinic, 911 or go to  nearest emergency room if sxs worsen or pt is in crisis. The pt expresses understanding.   Each patient to whom he or she provides medical services by telemedicine is:  (1) informed of the relationship between the physician and patient and the respective role of any other health care provider with respect to management of the patient; and (2) notified that he or she may decline to receive medical services by telemedicine and may withdraw from such care at any time.

## 2024-01-24 ENCOUNTER — OFFICE VISIT (OUTPATIENT)
Dept: PSYCHIATRY | Facility: CLINIC | Age: 49
End: 2024-01-24
Payer: COMMERCIAL

## 2024-01-24 ENCOUNTER — PATIENT MESSAGE (OUTPATIENT)
Dept: PSYCHIATRY | Facility: CLINIC | Age: 49
End: 2024-01-24
Payer: COMMERCIAL

## 2024-01-24 DIAGNOSIS — F43.9 SITUATIONAL STRESS: ICD-10-CM

## 2024-01-24 DIAGNOSIS — F41.9 ANXIETY DISORDER, UNSPECIFIED TYPE: ICD-10-CM

## 2024-01-24 DIAGNOSIS — F32.A DEPRESSION, UNSPECIFIED DEPRESSION TYPE: Primary | ICD-10-CM

## 2024-01-24 PROCEDURE — 90834 PSYTX W PT 45 MINUTES: CPT | Mod: 95,,,

## 2024-01-26 NOTE — PROGRESS NOTES
"Individual Psychotherapy (PhD/LCSW)    2024    The patient location is: [facility] in [place], [place]  The patient phone number is: [phone]   Visit type: Virtual visit with synchronous audio and video  Each patient to whom he or she provides medical services by telemedicine is:  (1) informed of the relationship between the provider and patient and the respective role of any other health care provider with respect to management of the patient; and (2) notified that he or she may decline to receive medical services by telemedicine and may withdraw from such care at any time.  Crisis Disclaimer: Patient was informed that due to the virtual nature of the visit, that if a crisis develops, protocols will be implemented to ensure patient safety, including but not limited to: 1) Initiating a welfare check with local Law Enforcement, 2) Calling [facility]/National Crisis Hotline, and/or 3) Initiating PEC/CEC procedures.       Interim Events/Subjective Report/Content of Current Session:  follow-up appointment.    Pt is a 48 y.o. Female with past psychiatric hx of depression and anxiety who presents for follow-up treatment.     Pt discussed continued frustrations with mother-in-law; "she is driving me crazy." Pt discussed dealing with mother-in-law's lack of boundaries for the past 20 years. Pt reported how she has been worse since her  passed away in . Pt discussed how mother-in-law will not respect boundaries pt will try to set; pt expressed frustrations.     Pt reported mother-in-law has radiation today to treat cancer. Pt discussed being fearful of mother-in-law's "manipulative ways" while also feeling sorry for her due to illness.      Pt discussed feeling sad yesterday realizing it was 10 years since pt father .     Pt processed feelings regarding brothers-in-law; pt reported one brother-in-law has schizoaffective disorder who is on SSDI, another brother-in-law has a hx of substance abuse and on " SSDI, another brother-in-law has bipolar disorder, and another brother who found hanging by a noose by pt's . Pt discussed previously helping pt's family members as much as possible yet feeling continuously drained by 's family.     Pt discussed feeling very stressed last week regarding mother-in-law's finances and driving back and forth from [place] to [place], [place]; pt reported there ended up being an error at the bank after a week of investigation, but pt and  believed there was $60,000 missing.     Pt discussed wanting to focus on personal happiness/wellbeing. Pt discussed self-care practices: washing face morning and night, daily bike riding with , and daily journaling. Pt discussed liking expressing/processing feelings via journaling.     Pt discussed buying less alcohol and drinking in moderation; pt reported noticing her daughter drank a lot of her alcohol and not wanting her daughter to continue this habit.     Pt discussed finding out  has diabetes at a doctor's appointment; pt processed feelings regarding 's mood changes.     Pt stated pt and daughter did not go visit daughter's boyfriend in long term in [place], [place] as planned as visitation was canceled due to limited staff.     Pt discussed how she previously loved throwing parties for friends/family. Pt reported going to an enjoyable event last night with music, line dancing, and good music last night. Pt discussed liking smaller gatherings now.     Pt reported she finalized a trip to [place] in March 2024; looking forward to spending time with family.     Current symptoms:  Depression: dysphoric mood, insomnia, fatigue, and difficulty concentrating.  Anxiety: excessive worrying, restlessness, and muscle tension.  Sleep: frequent night time awakening and non-restful sleep.  [place]:  denies.  Psychosis: denies .      Therapeutic Intervention/Techniques: behavior modification, insight oriented, interactive,  and supportive; relevant to diagnosis, patient responds to this modality    Will continue to follow.   Pt aware to contact sw for any additional needs that may occur prior to next session.    Risk Parameters:  Patient reports no suicidal ideation  Patient reports no homicidal ideation  Patient reports no self-injurious behavior  Patient reports no violent behavior    Diagnosis:   1. Depression, unspecified depression type        2. Anxiety disorder, unspecified type        3. Situational stress            Return to Clinic: as scheduled  Counseling time: 60  -Call to report any worsening of symptoms or problems associated with medication.  - Pt instructed to go to ER if thoughts of harming self or others arise.   -Supportive therapy and psychoeducation provided  -Pt instructed to call clinic, 911 or go to nearest emergency room if sxs worsen or pt is in crisis. The pt expresses understanding.   Each patient to whom he or she provides medical services by telemedicine is:  (1) informed of the relationship between the physician and patient and the respective role of any other health care provider with respect to management of the patient; and (2) notified that he or she may decline to receive medical services by telemedicine and may withdraw from such care at any time.

## 2024-02-05 ENCOUNTER — OFFICE VISIT (OUTPATIENT)
Dept: PSYCHIATRY | Facility: CLINIC | Age: 49
End: 2024-02-05
Payer: COMMERCIAL

## 2024-02-05 DIAGNOSIS — F43.9 SITUATIONAL STRESS: ICD-10-CM

## 2024-02-05 DIAGNOSIS — F32.A DEPRESSION, UNSPECIFIED DEPRESSION TYPE: Primary | ICD-10-CM

## 2024-02-05 DIAGNOSIS — F41.9 ANXIETY DISORDER, UNSPECIFIED TYPE: ICD-10-CM

## 2024-02-05 PROCEDURE — 90837 PSYTX W PT 60 MINUTES: CPT | Mod: 95,,,

## 2024-02-05 NOTE — PROGRESS NOTES
Individual Psychotherapy (PhD/LCSW)    02/20/2024    The patient location is: [place], [place]   The patient phone number is: [phone]   Visit type: Virtual visit with synchronous audio and video  Each patient to whom he or she provides medical services by telemedicine is:  (1) informed of the relationship between the provider and patient and the respective role of any other health care provider with respect to management of the patient; and (2) notified that he or she may decline to receive medical services by telemedicine and may withdraw from such care at any time.  Crisis Disclaimer: Patient was informed that due to the virtual nature of the visit, that if a crisis develops, protocols will be implemented to ensure patient safety, including but not limited to: 1) Initiating a welfare check with local Law Enforcement, 2) Calling [facility]/National Crisis Hotline, and/or 3) Initiating PEC/CEC procedures.       Interim Events/Subjective Report/Content of Current Session:  follow-up appointment.    Pt is a 48 y.o. Female with past psychiatric hx of depression and anxiety who presents for follow-up treatment.    Today's therapy appt canceled by SW due to inappropriate location for virtual appt. Appt has been rescheduled for 2/21/2024 at 1:45pm virtually.     Erik De Souza LCSW

## 2024-02-20 ENCOUNTER — OFFICE VISIT (OUTPATIENT)
Dept: PSYCHIATRY | Facility: CLINIC | Age: 49
End: 2024-02-20
Payer: COMMERCIAL

## 2024-02-20 DIAGNOSIS — F43.9 SITUATIONAL STRESS: ICD-10-CM

## 2024-02-20 DIAGNOSIS — F41.9 ANXIETY DISORDER, UNSPECIFIED TYPE: ICD-10-CM

## 2024-02-20 DIAGNOSIS — F32.A DEPRESSION, UNSPECIFIED DEPRESSION TYPE: Primary | ICD-10-CM

## 2024-02-20 PROCEDURE — 99499 UNLISTED E&M SERVICE: CPT | Mod: 95,,,

## 2024-02-20 NOTE — PROGRESS NOTES
"Individual Psychotherapy (PhD/LCSW)    02/21/2024    The patient location is: home in [place], [place]   The patient phone number is: [phone]   Visit type: Virtual visit with synchronous audio and video  Each patient to whom he or she provides medical services by telemedicine is:  (1) informed of the relationship between the provider and patient and the respective role of any other health care provider with respect to management of the patient; and (2) notified that he or she may decline to receive medical services by telemedicine and may withdraw from such care at any time.  Crisis Disclaimer: Patient was informed that due to the virtual nature of the visit, that if a crisis develops, protocols will be implemented to ensure patient safety, including but not limited to: 1) Initiating a welfare check with local Law Enforcement, 2) Calling [employer]/National Crisis Hotline, and/or 3) Initiating PEC/CEC procedures.       Interim Events/Subjective Report/Content of Current Session:  follow-up appointment.    Pt is a 48 y.o. Female with past psychiatric hx of depression and anxiety who presents for follow-up treatment.    Pt discussed having a "Thunderclap Headache;" pt stated starting a new medication last night being afraid of having side effects of kidney failure. Pt discussed how she got an appointment with [facility] for Neuro[facility] to obtain further help with headache issues.     Pt discussed how she is taking her daughter (Batool) to see her boyfriend (Anselmo) at the long-term; pt discussed how she does not feel like driving to [place], [place]. Pt expressed wishing she could break the pair up; pt discussed feeling like the boyfriend's family is "toxic."     Pt discussed how daughter's boyfriend's family has been trying to coordinate a ski trip; pt stated she does not want to go but unsure what will happen.     Pt discussed cheating scandal between friend and friend's ; pt expressed frustration that these influences are " "impacting her daughter.     Pt discussed how her son's ex-girlfriend is coming to visit from [place].     Pt discussed trying to put a "positive spin on things as much as I can."     Pt discussed enjoying planning the upcoming trip to [place].     Pt discussed feeling proud of  for walking daily, managing diabetes, and losing weight.     Pt discussed feeling frustrated how her mother-in-law shows up unannounced to pt's home; pt discussed how does not like this and reports she will not talk to mother-in-law about it. Pt stated "I feel like I have to roll with it." Discussed coping with mother-in-law showing up for unannounced visits and preparing strategies.     Pt stated she is continuing to drink 1-2 glasses of wine per night without plans to stop.     Pt reported she has been journaling. Pt expressed feeling sad due to daughter's relationship; "feel like people in our [place] don't like us because of it."    Pt discussed how  is studying medical coding; pt stated she wants to learn as well so they can both work remote one day. Pt discussed liking new beginnings.     Current symptoms:  Depression: dysphoric mood, insomnia, fatigue, and difficulty concentrating.  Anxiety: excessive worrying, restlessness, and muscle tension.  Sleep: frequent night time awakening, difficulty falling asleep, and non-restful sleep.  [place]:  denies.  Psychosis: denies .    Therapeutic Intervention/Techniques: behavior modification, insight oriented, interactive, and supportive; relevant to diagnosis, patient responds to this modality    Will continue to follow.   Pt aware to contact sw for any additional needs that may occur prior to next session.    Risk Parameters:  Patient reports no suicidal ideation  Patient reports no homicidal ideation  Patient reports no self-injurious behavior  Patient reports no violent behavior    Diagnosis:   1. Depression, unspecified depression type        2. Anxiety disorder, unspecified type "        3. Situational stress              Return to Clinic: as scheduled  Counseling time: 45  -Call to report any worsening of symptoms or problems associated with medication.  - Pt instructed to go to ER if thoughts of harming self or others arise.   -Supportive therapy and psychoeducation provided  -Pt instructed to call clinic, 911 or go to nearest emergency room if sxs worsen or pt is in crisis. The pt expresses understanding.   Each patient to whom he or she provides medical services by telemedicine is:  (1) informed of the relationship between the physician and patient and the respective role of any other health care provider with respect to management of the patient; and (2) notified that he or she may decline to receive medical services by telemedicine and may withdraw from such care at any time.

## 2024-02-21 ENCOUNTER — OFFICE VISIT (OUTPATIENT)
Dept: PSYCHIATRY | Facility: CLINIC | Age: 49
End: 2024-02-21
Payer: COMMERCIAL

## 2024-02-21 DIAGNOSIS — F32.A DEPRESSION, UNSPECIFIED DEPRESSION TYPE: Primary | ICD-10-CM

## 2024-02-21 DIAGNOSIS — F41.9 ANXIETY DISORDER, UNSPECIFIED TYPE: ICD-10-CM

## 2024-02-21 DIAGNOSIS — F43.9 SITUATIONAL STRESS: ICD-10-CM

## 2024-02-21 PROCEDURE — 90834 PSYTX W PT 45 MINUTES: CPT | Mod: 95,,,

## 2024-02-29 ENCOUNTER — OFFICE VISIT (OUTPATIENT)
Dept: NEUROLOGY | Facility: CLINIC | Age: 49
End: 2024-02-29
Payer: COMMERCIAL

## 2024-02-29 ENCOUNTER — PATIENT MESSAGE (OUTPATIENT)
Dept: NEUROLOGY | Facility: CLINIC | Age: 49
End: 2024-02-29

## 2024-02-29 VITALS
SYSTOLIC BLOOD PRESSURE: 121 MMHG | BODY MASS INDEX: 38.14 KG/M2 | HEIGHT: 68 IN | WEIGHT: 251.63 LBS | DIASTOLIC BLOOD PRESSURE: 78 MMHG | RESPIRATION RATE: 17 BRPM | TEMPERATURE: 97 F | HEART RATE: 58 BPM

## 2024-02-29 DIAGNOSIS — M54.2 CHRONIC NECK PAIN: ICD-10-CM

## 2024-02-29 DIAGNOSIS — G89.29 CHRONIC NECK PAIN: ICD-10-CM

## 2024-02-29 DIAGNOSIS — E66.01 SEVERE OBESITY (BMI 35.0-39.9) WITH COMORBIDITY: ICD-10-CM

## 2024-02-29 DIAGNOSIS — G95.9 CERVICAL MYELOPATHY: ICD-10-CM

## 2024-02-29 DIAGNOSIS — G44.86 CERVICOGENIC HEADACHE: Primary | ICD-10-CM

## 2024-02-29 PROCEDURE — 3074F SYST BP LT 130 MM HG: CPT | Mod: CPTII,S$GLB,, | Performed by: PHYSICIAN ASSISTANT

## 2024-02-29 PROCEDURE — 99205 OFFICE O/P NEW HI 60 MIN: CPT | Mod: S$GLB,,, | Performed by: PHYSICIAN ASSISTANT

## 2024-02-29 PROCEDURE — 99999 PR PBB SHADOW E&M-EST. PATIENT-LVL V: CPT | Mod: PBBFAC,,, | Performed by: PHYSICIAN ASSISTANT

## 2024-02-29 PROCEDURE — 3078F DIAST BP <80 MM HG: CPT | Mod: CPTII,S$GLB,, | Performed by: PHYSICIAN ASSISTANT

## 2024-02-29 PROCEDURE — 3008F BODY MASS INDEX DOCD: CPT | Mod: CPTII,S$GLB,, | Performed by: PHYSICIAN ASSISTANT

## 2024-02-29 PROCEDURE — 1160F RVW MEDS BY RX/DR IN RCRD: CPT | Mod: CPTII,S$GLB,, | Performed by: PHYSICIAN ASSISTANT

## 2024-02-29 PROCEDURE — 1159F MED LIST DOCD IN RCRD: CPT | Mod: CPTII,S$GLB,, | Performed by: PHYSICIAN ASSISTANT

## 2024-02-29 RX ORDER — PREDNISONE 20 MG/1
TABLET ORAL
Qty: 12 TABLET | Refills: 0 | Status: SHIPPED | OUTPATIENT
Start: 2024-02-29

## 2024-02-29 RX ORDER — CHLORZOXAZONE 500 MG/1
TABLET ORAL
Qty: 60 TABLET | Refills: 5 | Status: SHIPPED | OUTPATIENT
Start: 2024-02-29

## 2024-02-29 NOTE — PATIENT INSTRUCTIONS
PT ordered  Pain management referral placed      Stop the flexeril stop the tizanidine   Try the parafon forte/chlorzoxazone , 1 pill am, 1 pill noon as needed for neck pain, if it makes you tired, just cut in half, don't use with alcohol     Tomorrow start the steroids, on full stomach at breakfast time only  3 pills for 2 days, 2 pills for 2 days, 1 pill for 2 days then off       If signs of stroke (e.g., worst headache of life, sudden vision change, numbness/weakness one side of the body, facial droop, speech change or confusion), call 911.

## 2024-02-29 NOTE — PROGRESS NOTES
"  [facility]  [address] [place] [address]  Phone:[phone]  Fax: [phone]   New Patient Consultation    Patient Name: Ninfa Aguilar  : 1975  MRN:  [id number]  Today: 2024   chief complaint: Headache    PCP: Yahaira Almanza MD.       Assessment:   Ninfa Aguilar is a 48 y.o. right handed femalewith a PMHx of: neck pain/back pain, essential tremor, kidney stones (?), polycystic ovaries, vitamin D defic, HA,  and depression/anxiety   whom presents solo at the request of the ER provider (referral 10/6/2023)for HA. HA appear to be cervicogenic HA, likely worsened by stress/anxiety and other life style concerns she self identified. Felt in past after having interventional procedures on her neck, this helped more for HA and other issues. "I can't go back to the same person because of my insurance, but could I see someone here at [facility]?"       Review:    ICD-10-CM ICD-9-CM   1. Cervicogenic headache  G44.86 784.0   2. Chronic neck pain  M54.2 723.1    G89.29 338.29   3. Cervical myelopathy  G95.9 721.1   4. Severe obesity (BMI 35.0-39.9) with comorbidity  E66.01 278.01     Noted Dx #s 3-4 can indirectly affect HA and help guide/limit treatment options. I will defer to PCP/other specialists to help manage.       Plan:   Discussed realistic goals of care with patient at length. Discussed medication options, need for lifestyle adjustment. Discussed treatment will take time. Goal will be to reduce frequency/intensity/quantity of HA, not to be completely HA free. Gave copy of [facility] triggers for migraine informational sheet (N.b., a standard I give to patients who come to seek my care in HA clinic, regardless if they have migraines or not) and discussed clinic's non narcotic policy re: HA. Patient voiced understanding and agreement.            -will have patient track HA, discussed jorge alberto for smart phone           -will have " patient work on lifestyle, including slowly cutting back on ETOH use               For HA Prevention:  1 stop flexeril, robaxin (called/left message and sent her a portal message) and zanaflex, try parafon forte 1 pill Qam/noon PRN neck pain/HA, discussed adv effects/dosing, not to use with etoh, and if it makes her tired, cut in half, she agreed  2 PT ordered  3 mental health meds, AED and BP meds per other providers     For HA :  Limit OTC to <3 days use in week     To break up Headaches:  Course of deltasone to start tomorrow, discussed taper schedule (wrote it out with read back), take on full stomach at breakfast time only, take until completion and discussed side effects. The patient agreed.       Other:  Follow up with mental health   Referral to pain management   If signs of stroke (e.g., worst headache of life, sudden vision change, numbness/weakness one side of the body, facial droop, speech change or confusion), call 911.        All test results as well as any necessary instructions were reviewed and discussed with patient.    Review:  Orders Placed This Encounter    Ambulatory referral/consult to Physical/Occupational Therapy    Ambulatory referral/consult to Pain Clinic    chlorzoxazone (PARAFON FORTE) 500 mg Tab    predniSONE (DELTASONE) 20 MG tablet         Patient to return to PCP/other specialists for all other problems  Patient to continue on all medications as Rx'd   A detailed AVS was provided to the patient with patient readback   RTO- 2-3 months to check in   The patient indicates understanding of these issues and agrees to the plan.    HPI:   Ninfa Aguilar is a 48 y.o.right handed, female with a PMHx of: neck pain/back pain, essential tremor, kidney stones (?), polycystic ovaries, vitamin D defic, HA,  and depression/anxiety   whom presents solo at the request of the ER provider (referral 10/6/2023)for HA.      From chart review has [facility] followed by [facility]      HA  "HPI:  Start:HA in late 90s d/t meningitis, however got better over time (was on topamax in past, may have had a kidney stone... ?) and concurrently multi year hx of neck pain (was followed by pain management, last intervention was 2+ years ago, felt it really helped), however in Sept 2023 started developing HA, had a "thunder clap HA" and eventually went to ER d/t the discomfort, has had 2 more sudden attack of pain. Has been having more neck pain, historically would radiate into shoulders but now radiating more in the top occipitalis/vertex and occasionally into the parietal region.   History of trauma (no), History of CNS infection (yes, meningitis), History of Stroke (no)  Location:base of neck to the occipitals, vertex/parietal region   Severity: moderate to severe   Duration:>24 hours   Frequency:4 days of HA in past month   Neck pain: daily for many years   Associated factors (bolded positive) WITH HA ( or migraine): Nausea, vomiting, photophobia, phonophobia, tinnitus, scalp pain, vision loss, diplopia, scintillations, eye pain, jaw pain, weakness?    Tried:tizanidine  Triggers (in bold): coitus/orgasm(ER visit, 10/3/2023 notes), stress, lack of sleep, too much caffeine, too little caffeine, weather change, seasonal change, strong odours, bright lights, sunlight, food    Last HA: has one now      Positives in bold: Hx of Kidney Stones, asthma, GI bleed, osteoporosis, CAD/MI, CVA/TIA, DM    Imaging on file: MRI Brain and MRI C spine 2023 (as below)   Therapies tried in past: (failures to be marked, if known---why did it fail?)  Ibuprofen   Celebrex  Topamax  Bupropion  Gabapentin  Metoprolol  Losartan  Trazodone  Tramadol  Zanaflex  Ultram  Flexeril  Parafon forte  Soma        Medication Reconciliation:   Current Outpatient Medications   Medication Sig Dispense Refill    ALPRAZolam (XANAX) 1 MG tablet Take 1 tablet (1 mg total) by mouth nightly as needed for Anxiety. 30 tablet 0    aspirin 81 MG Chew aspirin " 81 mg chewable tablet      atorvastatin (LIPITOR) 20 MG tablet Take 40 mg by mouth every evening.      buPROPion (WELLBUTRIN XL) 150 MG TB24 tablet TAKE 1 TABLET(150 MG) BY MOUTH EVERY DAY 30 tablet 0    celecoxib (CELEBREX) 200 MG capsule TAKE 1 CAPSULE(200 MG) BY MOUTH TWICE DAILY 60 capsule 2    cholecalciferol, vitamin D3, (VITAMIN D3) 25 mcg (1,000 unit) capsule Take 5,000 Units by mouth.      cyclobenzaprine (FLEXERIL) 10 MG tablet Take 10 mg by mouth nightly as needed.      estradioL (ESTRACE) 2 MG tablet Take 1 tablet (2 mg total) by mouth once daily. 30 tablet 11    gabapentin (NEURONTIN) 300 MG capsule       ibuprofen (ADVIL,MOTRIN) 200 MG tablet Take 200 mg by mouth every 6 (six) hours as needed for Pain.      levothyroxine (SYNTHROID) 100 MCG tablet Synthroid 100 mcg tablet   TAKE 1 TABLET BY MOUTH EVERY DAY      losartan (COZAAR) 50 MG tablet TK 1 T PO QD      methocarbamoL (ROBAXIN) 750 MG Tab Take 1 tablet (750 mg total) by mouth daily as needed (muscle tightness). 15 tablet 3    metoprolol succinate (TOPROL-XL) 50 MG 24 hr tablet TK 1 T PO QD      tiZANidine (ZANAFLEX) 2 MG tablet Take 2 tablets (4 mg total) by mouth every 8 (eight) hours as needed (muscle neck tension). 30 tablet 1    traMADoL (ULTRAM) 50 mg tablet Take 1 tablet (50 mg total) by mouth every 24 hours as needed for Pain. 12 tablet 0    vitamin E 1000 UNIT capsule Take 1,000 Units by mouth.      chlorzoxazone (PARAFON FORTE) 500 mg Tab 1 pill am, 1 pill noon as needed for neck pain and headaches 60 tablet 5    predniSONE (DELTASONE) 20 MG tablet On full stomach (breakfast): 3 tabs for 2 days, 2 tabs for 2 days, 1 tab for 2 days. Finish 12 tablet 0     No current facility-administered medications for this visit.     Review of patient's allergies indicates:   Allergen Reactions    Latex, natural rubber Rash    Iodinated contrast media     Iodine     Ozempic [semaglutide] Other (See Comments)     SI    Iodine and iodide containing  "products Rash     Eats shellfish.  Eats shellfish.  Eats shellfish.    Meloxicam Hives     Can take ibuprofen       PMHx:  Past Medical History:   Diagnosis Date    Abnormal Pap smear of cervix     Arthritis     Back pain     Cervical disc syndrome     Depression     Essential tremor     General anesthetics causing adverse effect in therapeutic use     patient reports she was told she "woke up" during tubal ligation    Goiter     MNG    Hypothyroidism     Lumbar disc disease     Patient is [employer]     Polycystic ovaries     PONV (postoperative nausea and vomiting)     Vitamin D deficiency      Past Surgical History:   Procedure Laterality Date    APPENDECTOMY      BREAST BIOPSY Left     Excisional removal of lymph node, benign    DILATION AND CURETTAGE OF UTERUS      FOOT MASS EXCISION Right 10/30/2018    Procedure: EXCISION, MASS, FOOT probable fibroma;  Surgeon: Ha Caicedo MD;  Location: [facility] OR [address] [place] [address];  Service: Orthopedics;  Laterality: Right;    HYSTERECTOMY  2017    DLH ov in situ     OVARIAN CYST SURGERY Right     SHOULDER SURGERY      right    tubal lig         Fhx:  Family History   Problem Relation Age of Onset    COPD Father     Peripheral vascular disease Father     Cancer Mother 50        breast    Breast cancer Mother     Asperger's syndrome Daughter     Thyroid disease Daughter         Hashimotos'    Ovarian cancer Neg Hx     Colon cancer Neg Hx        Shx: etoh use, 3 glasses of wine at night   Social History     Socioeconomic History    Marital status:    Tobacco Use    Smoking status: Former     Current packs/day: 0.00     Average packs/day: 0.1 packs/day for 5.0 years (0.5 ttl pk-yrs)     Types: Cigarettes     Start date: 1987     Quit date: 1992     Years since quittin.6    Smokeless tobacco: Former   Substance and Sexual Activity    Alcohol use: Yes     Alcohol/week: 1.0 - 2.0 standard drink of alcohol     Types: 1 - 2 Glasses of wine per week    "  Comment: daily    Drug use: No    Sexual activity: Yes     Partners: Male     Birth control/protection: None, See Surgical Hx     Comment:  to [place]      Social Determinants of Health     Financial Resource Strain: Low Risk  (2024)    Overall Financial Resource Strain (CARDIA)     Difficulty of Paying Living Expenses: Not very hard   Food Insecurity: No Food Insecurity (2024)    Hunger Vital Sign     Worried About Running Out of Food in the Last Year: Never true     Ran Out of Food in the Last Year: Never true   Transportation Needs: No Transportation Needs (2024)    PRAPARE - Transportation     Lack of Transportation (Medical): No     Lack of Transportation (Non-Medical): No   Physical Activity: Insufficiently Active (2024)    Exercise Vital Sign     Days of Exercise per Week: 3 days     Minutes of Exercise per Session: 30 min   Stress: Stress Concern Present (2024)    [employer] [place] [employer] - Occupational Stress Questionnaire     Feeling of Stress : Rather much   Social Connections: Unknown (2024)    Social Connection and Isolation Panel [NHANES]     Frequency of Communication with Friends and Family: More than three times a week     Frequency of Social Gatherings with Friends and Family: Three times a week     Active Member of Clubs or Organizations: Yes     Attends Club or Organization Meetings: More than 4 times per year     Marital Status:    Housing Stability: Low Risk  (2024)    Housing Stability Vital Sign     Unable to Pay for Housing in the Last Year: No     Number of Places Lived in the Last Year: 1     Unstable Housing in the Last Year: No           Labs:   Reviewed in chart     Imagin2023 MRI brain (report): IMPRESSION:     Motion degradation.     Small subcortical foci of white matter hyperintensity within the right frontal lobe, nonspecific.     Incidental sinus disease.    -Noted she brings up concern that her cerebellar  tonsils are low lying, I reviewed imaging studies of C spine that go back to 2016 and from my view, it appears similar. I discussed I am not a radiologist but from my view, it does appear she has had this for a long time and doesn't appear to have drastically changed. She voiced agreement.  MICHAEL MELVIN 02/29/2024       10/18/2023 MRI C spine (report): HISTORY: Neck pain.     Multiplanar pre and postcontrast imaging are performed before and following intravenous administration of 11.5 mL Gadavist..     Comparison is made to examination of 12/21/2021. There is degradation of the examination by patient motion.     The cervical vertebral bodies are appropriately maintained in height. Vertebral body alignment is satisfactory. There is mild disc space narrowing at C5-6 and C6-7. No pathologic marrow replacement demonstrated.     At C2-3, shallow disc bulging and posterior osteophytic ridging results in mild mass effect upon the ventral margin of the thecal sac towards the left of midline without significant encroachment of the central spinal canal. There is minor left foraminal narrowing.     At C3-4, shallow bulging of the disc margin contributes to mild mass effect upon the ventral margin of the thecal sac without significant central canal stenosis. There is mild to moderate left foraminal narrowing.     At C4-5, there is shallow bulging of the disc margin and small posteriorly directed marginal osteophyte formation contributing to mild mass effect upon the thecal sac diffusely. The central canal is not significantly encroached. There is mild left foraminal narrowing.     At C5-6, broad-based bulging of the disc margin and posterior osteophyte formation results in mild diffuse mass effect upon the thecal sac without direct cord impingement. There is moderately severe right foraminal and moderate left foraminal narrowing.     At C6-7, there is shallow bulging of the disc margin resulting in mild mass effect upon the thecal  "sac. There are mild degrees of bilateral foraminal narrowing.     At C7-T1, minimal disc bulging contributes to minor mass effect upon the thecal sac. There are facet degenerative changes resulting in moderate left foraminal and mild-moderate right foraminal narrowing.     The visualized segment of the cervical spinal cord appears unremarkable.     No pathologic enhancement is demonstrated following contrast administration.     IMPRESSION:     Degradation by patient motion.     Degenerative disc changes contributing to central canal and foraminal encroachment as detailed.    Other testing:  Reviewed in chart     Note: I have independently reviewed any/all imaging/labs/tests and agree with the report (s) as documented.  Any discrepancies will be as noted/demarcated by free text.  MICHAEL MELVIN 2/29/2024                     ROS:   Review Of Systems (questions asked, positive or additions in BOLD)  Gen: Weight change-gain, fatigue/malaise, pyrexia   HEENT: Tinnitus, headache,  blurred vision, eye pain, diplopia, photophobia,  nose bleeds, congestion, sore throat, jaw pain, scalp pain, neck stiffness   Card: Palpitations, CP   Pulm: SOB, cough   Vas: Easy bruising, easy bleeding   GI: N/V/D/C, incontinence, hematemesis, hematochezia    : incontinence, hematuria   Endocrine: Temp intolerance, polyuria, polydipsia   M/S: Neck pain, myalgia, back pain, joint pain, falls    Neuro: PER HPI   PSY: Memory loss, confusion, depression, anxiety, trouble in sleep          EXAM:   /78 (BP Location: Right arm, Patient Position: Sitting, BP Method: Large (Automatic))   Pulse (!) 58   Temp 97.1 °F (36.2 °C)   Resp 17   Ht 5' 8" (1.727 m)   Wt 114.1 kg (251 lb 10.5 oz)   LMP 01/15/2017   BMI 38.26 kg/m²    GEN:  NAD  HEENT: NC/AT, Frontalis was NTTP, temporalis was TTP,  nares patent, dentition appropriate,  neck supple, trachea midline, Occiput and trapezius VTTP, sits with head forward posture      EXTREM:  no edema " present.    NEURO:  Mental Status:  Awake, alert and appropriately oriented to time, place, and person.  Normal attention and concentration.  Speech is fluent and appropriate language function (I.e., comprehension), hand wringing behaviour present     Cranial Nerves:     Pupils are equal and reactive to light.  Extraocular movements are intact and without nystagmus.  Visual fields are full to confrontation testing.  Facial movement is symmetric.  Facial sensation is intact.  Hearing is normal. Uvula in midline.  DROM of neck in all (6) directions, shoulder shrug symmetrical. Tongue in midline without fasiculation.     Motor:  RUE:appropriate against gravity and medium force as tested 5/5              LUE: appropriate against gravity and medium force as tested 5/5              RLE:appropriate against gravity and medium force as tested 5/5              LLE: appropriate against gravity and medium force as tested 5/5  No drift  Head tremor present  No resting tremor but did have some R>L UE tremor in winged position   No hypomimia      Sensory:  RUE  intact light touch, proprioception, and temperature  LUE intact light touch, proprioception, and temperature    RLE intact light touch  LLE intact light touch      DTR's:                                            R              L  biceps 2+ 2+         brachioradialis 2+ 2+   Knee jerk 2+ 2+        Coordination:  FTN-WNL.      Gait and Stance: Normal manner of stance and gait function testing. Romberg was negative.          This document has been electronically signed by  Riley COATESRustam France MPA, PA-C on 2/29/2024, I have personally typed this message using the EMR.       Dr Chapin MD  was available during today's visit.     Personal Protective Equipment:    Personal Protective Equipment was used during this encounter including:  mask-surgical and non latex gloves.          CC: Yahaira Almanza MD/ER provider (Dr. Ruby)

## 2024-03-07 ENCOUNTER — OFFICE VISIT (OUTPATIENT)
Dept: PAIN MEDICINE | Facility: CLINIC | Age: 49
End: 2024-03-07
Payer: COMMERCIAL

## 2024-03-07 ENCOUNTER — TELEPHONE (OUTPATIENT)
Dept: PAIN MEDICINE | Facility: CLINIC | Age: 49
End: 2024-03-07

## 2024-03-07 VITALS
DIASTOLIC BLOOD PRESSURE: 62 MMHG | HEIGHT: 68 IN | HEART RATE: 55 BPM | WEIGHT: 252.44 LBS | SYSTOLIC BLOOD PRESSURE: 134 MMHG | BODY MASS INDEX: 38.26 KG/M2

## 2024-03-07 DIAGNOSIS — M54.2 CHRONIC NECK PAIN: ICD-10-CM

## 2024-03-07 DIAGNOSIS — G89.29 CHRONIC NECK PAIN: ICD-10-CM

## 2024-03-07 DIAGNOSIS — M47.812 CERVICAL SPONDYLOSIS: Primary | ICD-10-CM

## 2024-03-07 PROCEDURE — 4010F ACE/ARB THERAPY RXD/TAKEN: CPT | Mod: CPTII,S$GLB,, | Performed by: ANESTHESIOLOGY

## 2024-03-07 PROCEDURE — 3075F SYST BP GE 130 - 139MM HG: CPT | Mod: CPTII,S$GLB,, | Performed by: ANESTHESIOLOGY

## 2024-03-07 PROCEDURE — 99204 OFFICE O/P NEW MOD 45 MIN: CPT | Mod: S$GLB,,, | Performed by: ANESTHESIOLOGY

## 2024-03-07 PROCEDURE — 1160F RVW MEDS BY RX/DR IN RCRD: CPT | Mod: CPTII,S$GLB,, | Performed by: ANESTHESIOLOGY

## 2024-03-07 PROCEDURE — 3008F BODY MASS INDEX DOCD: CPT | Mod: CPTII,S$GLB,, | Performed by: ANESTHESIOLOGY

## 2024-03-07 PROCEDURE — 3078F DIAST BP <80 MM HG: CPT | Mod: CPTII,S$GLB,, | Performed by: ANESTHESIOLOGY

## 2024-03-07 PROCEDURE — 1159F MED LIST DOCD IN RCRD: CPT | Mod: CPTII,S$GLB,, | Performed by: ANESTHESIOLOGY

## 2024-03-07 PROCEDURE — 99999 PR PBB SHADOW E&M-EST. PATIENT-LVL V: CPT | Mod: PBBFAC,,, | Performed by: ANESTHESIOLOGY

## 2024-03-07 NOTE — H&P (VIEW-ONLY)
[facility] New Patient Evaluation      Referred by: Riley France    PCP:     CC:   Chief Complaint   Patient presents with    Neck Pain          3/7/2024     1:52 PM   Last 3 PDI Scores   Pain Disability Index (PDI) 43         HPI:   Ninfa Aguilar is a 48 y.o. female patient who has a past medical history of Abnormal Pap smear of cervix, Arthritis, Back pain, Cervical disc syndrome, Depression, Essential tremor, General anesthetics causing adverse effect in therapeutic use, Goiter, Headache, Hypothyroidism, Lumbar disc disease, Patient is Christianity, Polycystic ovaries, PONV (postoperative nausea and vomiting), and Vitamin D deficiency. She presents with neck pain.  She has had chronic neck pain for the past 10 years.  Today she reports axial neck pain, 5/10, aching, deep.  She can get referred pain up the back of her occiput and into her shoulders.  Her pain isn't worse with anything in particular and is present constantly and can be relieved with PT, heat, stretching.      Pain Intervention History:      Past Spine Surgical History:      Past and current medications:  Antineuropathics: gabapentin   NSAIDs: ibuprofen   Physical therapy: yes, completed   Antidepressants:  Muscle relaxers: robaxin, flexeril   Opioids: tramadol   Antiplatelets/Anticoagulants: spirin     History:    Current Outpatient Medications:     ALPRAZolam (XANAX) 1 MG tablet, Take 1 tablet (1 mg total) by mouth nightly as needed for Anxiety., Disp: 30 tablet, Rfl: 0    aspirin 81 MG Chew, aspirin 81 mg chewable tablet, Disp: , Rfl:     atorvastatin (LIPITOR) 20 MG tablet, Take 40 mg by mouth every evening., Disp: , Rfl:     buPROPion (WELLBUTRIN XL) 150 MG TB24 tablet, TAKE 1 TABLET(150 MG) BY MOUTH EVERY DAY, Disp: 30 tablet, Rfl: 0    celecoxib (CELEBREX) 200 MG capsule, TAKE 1 CAPSULE(200 MG) BY MOUTH TWICE DAILY, Disp: 60 capsule, Rfl: 2    chlorzoxazone (PARAFON FORTE) 500 mg Tab, 1 pill am, 1 pill noon as  "needed for neck pain and headaches, Disp: 60 tablet, Rfl: 5    cholecalciferol, vitamin D3, (VITAMIN D3) 25 mcg (1,000 unit) capsule, Take 5,000 Units by mouth., Disp: , Rfl:     cyclobenzaprine (FLEXERIL) 10 MG tablet, Take 10 mg by mouth nightly as needed., Disp: , Rfl:     estradioL (ESTRACE) 2 MG tablet, Take 1 tablet (2 mg total) by mouth once daily., Disp: 30 tablet, Rfl: 11    gabapentin (NEURONTIN) 300 MG capsule, , Disp: , Rfl:     ibuprofen (ADVIL,MOTRIN) 200 MG tablet, Take 200 mg by mouth every 6 (six) hours as needed for Pain., Disp: , Rfl:     levothyroxine (SYNTHROID) 100 MCG tablet, Synthroid 100 mcg tablet  TAKE 1 TABLET BY MOUTH EVERY DAY, Disp: , Rfl:     losartan (COZAAR) 50 MG tablet, TK 1 T PO QD, Disp: , Rfl:     methocarbamoL (ROBAXIN) 750 MG Tab, Take 1 tablet (750 mg total) by mouth daily as needed (muscle tightness)., Disp: 15 tablet, Rfl: 3    metoprolol succinate (TOPROL-XL) 50 MG 24 hr tablet, TK 1 T PO QD, Disp: , Rfl:     predniSONE (DELTASONE) 20 MG tablet, On full stomach (breakfast): 3 tabs for 2 days, 2 tabs for 2 days, 1 tab for 2 days. Finish, Disp: 12 tablet, Rfl: 0    traMADoL (ULTRAM) 50 mg tablet, Take 1 tablet (50 mg total) by mouth every 24 hours as needed for Pain., Disp: 12 tablet, Rfl: 0    vitamin E 1000 UNIT capsule, Take 1,000 Units by mouth., Disp: , Rfl:     Past Medical History:   Diagnosis Date    Abnormal Pap smear of cervix     Arthritis     Back pain     Cervical disc syndrome     Depression     Essential tremor     General anesthetics causing adverse effect in therapeutic use     patient reports she was told she "woke up" during tubal ligation    Goiter     MNG    Headache     Hypothyroidism     Lumbar disc disease     Patient is Sikhism     Polycystic ovaries     PONV (postoperative nausea and vomiting)     Vitamin D deficiency        Past Surgical History:   Procedure Laterality Date    APPENDECTOMY      BREAST BIOPSY Left     Excisional removal " of lymph node, benign    DILATION AND CURETTAGE OF UTERUS      FOOT MASS EXCISION Right 10/30/2018    Procedure: EXCISION, MASS, FOOT probable fibroma;  Surgeon: Ha Caicedo MD;  Location: [facility] OR [address] [place] [address];  Service: Orthopedics;  Laterality: Right;    HYSTERECTOMY  2017    [facility] ov in situ     OVARIAN CYST SURGERY Right     SHOULDER SURGERY      right    tubal lig  1998       Family History   Problem Relation Age of Onset    COPD Father     Peripheral vascular disease Father     Cancer Mother 50        breast    Breast cancer Mother     Asperger's syndrome Daughter     Thyroid disease Daughter         Hashimotos'    Ovarian cancer Neg Hx     Colon cancer Neg Hx        Social History     Socioeconomic History    Marital status:    Tobacco Use    Smoking status: Former     Current packs/day: 0.00     Average packs/day: 0.1 packs/day for 5.0 years (0.5 ttl pk-yrs)     Types: Cigarettes     Start date: 1987     Quit date: 1992     Years since quittin.6    Smokeless tobacco: Former   Substance and Sexual Activity    Alcohol use: Yes     Alcohol/week: 1.0 - 2.0 standard drink of alcohol     Types: 1 - 2 Glasses of wine per week     Comment: daily    Drug use: No    Sexual activity: Yes     Partners: Male     Birth control/protection: None, See Surgical Hx     Comment:  to [place]      Social Determinants of Health     Financial Resource Strain: Low Risk  (2024)    Overall Financial Resource Strain (CARDIA)     Difficulty of Paying Living Expenses: Not very hard   Food Insecurity: No Food Insecurity (2024)    Hunger Vital Sign     Worried About Running Out of Food in the Last Year: Never true     Ran Out of Food in the Last Year: Never true   Transportation Needs: No Transportation Needs (2024)    PRAPARE - Transportation     Lack of Transportation (Medical): No     Lack of Transportation (Non-Medical): No   Physical Activity: Insufficiently Active (2024)    Exercise  "Vital Sign     Days of Exercise per Week: 3 days     Minutes of Exercise per Session: 30 min   Stress: Stress Concern Present (1/24/2024)    [employer] - Occupational Stress Questionnaire     Feeling of Stress : Rather much   Social Connections: Unknown (1/24/2024)    Social Connection and Isolation Panel [NHANES]     Frequency of Communication with Friends and Family: More than three times a week     Frequency of Social Gatherings with Friends and Family: Three times a week     Active Member of Clubs or Organizations: Yes     Attends Club or Organization Meetings: More than 4 times per year     Marital Status:    Housing Stability: Low Risk  (1/24/2024)    Housing Stability Vital Sign     Unable to Pay for Housing in the Last Year: No     Number of Places Lived in the Last Year: 1     Unstable Housing in the Last Year: No       Review of patient's allergies indicates:   Allergen Reactions    Latex, natural rubber Rash    Iodinated contrast media     Iodine     Ozempic [semaglutide] Other (See Comments)     SI    Iodine and iodide containing products Rash     Eats shellfish.  Eats shellfish.  Eats shellfish.    Meloxicam Hives     Can take ibuprofen       Review of Systems:  12 point review of systems is negative.    Physical Exam:  Vitals:    03/07/24 1355   BP: 134/62   Pulse: (!) 55   Weight: 114.5 kg (252 lb 6.8 oz)   Height: 5' 8" (1.727 m)   PainSc:   5   PainLoc: Neck     Body mass index is 38.38 kg/m².    Gen: NAD  Psych: mood appropriate for given condition  HEENT: eyes anicteric   CV: RRR  HEENT: anicteric   Respiratory: non-labored, no signs of respiratory distress  Abd: non-distended  Skin: warm, dry and intact.  Gait: No antalgic gait.     Reproducible pain with cervical axial facet loading    Sensory:  Intact and symmetrical to light touch in C4-T1 dermatomes bilaterally.    Motor:    Right Left   C4 Shoulder Abduction  5  5   C5 Elbow Flexion    5  5   C6 Wrist " Extension  5  5   C7 Elbow Extension   5  5   C8/T1 Hand Intrinsics   5  5      Right Left   Triceps DTR 2+ 2+   Biceps DTR 2+ 2+        Patellar DTR 2+ 2+   Achilles DTR 2+ 2+   Huffman Absent  Absent                 Labs:  Lab Results   Component Value Date    HGBA1C 5.5 11/10/2023       Lab Results   Component Value Date    WBC 7.07 10/03/2023    HGB 13.6 10/03/2023    HCT 40.7 10/03/2023    MCV 94 10/03/2023     10/03/2023           Imaging:  MRI cervical spine 11/29/23  Comparison is made to examination of 12/21/2021. There is degradation of the examination by patient motion.     The cervical vertebral bodies are appropriately maintained in height. Vertebral body alignment is satisfactory. There is mild disc space narrowing at C5-6 and C6-7. No pathologic marrow replacement demonstrated.     At C2-3, shallow disc bulging and posterior osteophytic ridging results in mild mass effect upon the ventral margin of the thecal sac towards the left of midline without significant encroachment of the central spinal canal. There is minor left foraminal narrowing.  At C3-4, shallow bulging of the disc margin contributes to mild mass effect upon the ventral margin of the thecal sac without significant central canal stenosis. There is mild to moderate left foraminal narrowing.  At C4-5, there is shallow bulging of the disc margin and small posteriorly directed marginal osteophyte formation contributing to mild mass effect upon the thecal sac diffusely. The central canal is not significantly encroached. There is mild left foraminal narrowing.  At C5-6, broad-based bulging of the disc margin and posterior osteophyte formation results in mild diffuse mass effect upon the thecal sac without direct cord impingement. There is moderately severe right foraminal and moderate left foraminal narrowing.  At C6-7, there is shallow bulging of the disc margin resulting in mild mass effect upon the thecal sac. There are mild degrees  of bilateral foraminal narrowing.  At C7-T1, minimal disc bulging contributes to minor mass effect upon the thecal sac. There are facet degenerative changes resulting in moderate left foraminal and mild-moderate right foraminal narrowing.     The visualized segment of the cervical spinal cord appears unremarkable.     No pathologic enhancement is demonstrated following contrast administration.    Assessment:   Problem List Items Addressed This Visit    None  Visit Diagnoses       Cervical spondylosis    -  Primary    Chronic neck pain                  Ninfa Aguilar is a 48 y.o. female patient who has a past medical history of Abnormal Pap smear of cervix, Arthritis, Back pain, Cervical disc syndrome, Depression, Essential tremor, General anesthetics causing adverse effect in therapeutic use, Goiter, Headache, Hypothyroidism, Lumbar disc disease, Patient is Pentecostalism, Polycystic ovaries, PONV (postoperative nausea and vomiting), and Vitamin D deficiency. She presents with neck pain.  She has had chronic neck pain for the past 10 years.  Today she reports axial neck pain, 5/10, aching, deep.  She can get referred pain up the back of her occiput and into her shoulders.  Her pain isn't worse with anything in particular and is present constantly and can be relieved with PT, heat, stretching.    - on exam she has full strength of her upper extremities and intact sensation to light touch bilateral L4-S1.  She has reproducible pain with cervical axial facet loading bilaterally  - I independently reviewed her cervical MRI and she has multilevel bilateral facet arthropathy.  She also has multilevel bilateral foraminal narrowing.  - over the past 8 weeks she has maintain a PT directed home exercise program as well taken NSAIDs and muscle relaxants however continues to have neck pain with headaches that is limiting her mobility and interfering with the quality of her life.  She has discomfort when she does  things like turning her head while driving  - I think her pain is secondary to cervical spondylosis  - we will schedule for 1st diagnostic bilateral C3-4 and C4-5 medial branch blocks.  The risks and benefits of this intervention, and alternative therapies were discussed with the patient.  The discussion of risks included infection, bleeding, need for additional procedures or surgery, nerve damage.  Questions regarding the procedure, risks, expected outcome, and possible side effects were solicited and answered to the patient's satisfaction.  Ninfa Aguilar wishes to proceed with the injection or procedure.  Written consent was obtained.  - she takes aspirin preventatively.  We will have her hold this prior to injection      : Not applicable    Joshua Esparza M.D.  Interventional Pain Medicine / Anesthesiology    This note was completed with dictation software and grammatical errors may exist.

## 2024-03-07 NOTE — TELEPHONE ENCOUNTER
Physician - Dr Esparza    Type of Procedure/Injection - Cervical Medial Branch Block  C3/4 and C4/5      Laterality - Bilateral      Anxiolysis- RNIV      Need to hold medication - Yes      Aspirin for 7 days      Clearance needed - No      Follow up - phone call next day

## 2024-03-07 NOTE — PROGRESS NOTES
[facility] New Patient Evaluation      Referred by: Riley France    PCP:     CC:   Chief Complaint   Patient presents with    Neck Pain          3/7/2024     1:52 PM   Last 3 PDI Scores   Pain Disability Index (PDI) 43         HPI:   Ninfa Aguilar is a 48 y.o. female patient who has a past medical history of Abnormal Pap smear of cervix, Arthritis, Back pain, Cervical disc syndrome, Depression, Essential tremor, General anesthetics causing adverse effect in therapeutic use, Goiter, Headache, Hypothyroidism, Lumbar disc disease, Patient is Am[employer], Polycystic ovaries, PONV (postoperative nausea and vomiting), and Vitamin D deficiency. She presents with neck pain.  She has had chronic neck pain for the past 10 years.  Today she reports axial neck pain, 5/10, aching, deep.  She can get referred pain up the back of her occiput and into her shoulders.  Her pain isn't worse with anything in particular and is present constantly and can be relieved with PT, heat, stretching.      Pain Intervention History:      Past Spine Surgical History:      Past and current medications:  Antineuropathics: gabapentin   NSAIDs: ibuprofen   Physical therapy: yes, completed   Antidepressants:  Muscle relaxers: robaxin, flexeril   Opioids: tramadol   Antiplatelets/Anticoagulants: spirin     History:    Current Outpatient Medications:     ALPRAZolam (XANAX) 1 MG tablet, Take 1 tablet (1 mg total) by mouth nightly as needed for Anxiety., Disp: 30 tablet, Rfl: 0    aspirin 81 MG Chew, aspirin 81 mg chewable tablet, Disp: , Rfl:     atorvastatin (LIPITOR) 20 MG tablet, Take 40 mg by mouth every evening., Disp: , Rfl:     buPROPion (WELLBUTRIN XL) 150 MG TB24 tablet, TAKE 1 TABLET(150 MG) BY MOUTH EVERY DAY, Disp: 30 tablet, Rfl: 0    celecoxib (CELEBREX) 200 MG capsule, TAKE 1 CAPSULE(200 MG) BY MOUTH TWICE DAILY, Disp: 60 capsule, Rfl: 2    chlorzoxazone (PARAFON FORTE) 500 mg Tab, 1 pill am, 1 pill noon as  "needed for neck pain and headaches, Disp: 60 tablet, Rfl: 5    cholecalciferol, vitamin D3, (VITAMIN D3) 25 mcg (1,000 unit) capsule, Take 5,000 Units by mouth., Disp: , Rfl:     cyclobenzaprine (FLEXERIL) 10 MG tablet, Take 10 mg by mouth nightly as needed., Disp: , Rfl:     estradioL (ESTRACE) 2 MG tablet, Take 1 tablet (2 mg total) by mouth once daily., Disp: 30 tablet, Rfl: 11    gabapentin (NEURONTIN) 300 MG capsule, , Disp: , Rfl:     ibuprofen (ADVIL,MOTRIN) 200 MG tablet, Take 200 mg by mouth every 6 (six) hours as needed for Pain., Disp: , Rfl:     levothyroxine (SYNTHROID) 100 MCG tablet, Synthroid 100 mcg tablet  TAKE 1 TABLET BY MOUTH EVERY DAY, Disp: , Rfl:     losartan (COZAAR) 50 MG tablet, TK 1 T PO QD, Disp: , Rfl:     methocarbamoL (ROBAXIN) 750 MG Tab, Take 1 tablet (750 mg total) by mouth daily as needed (muscle tightness)., Disp: 15 tablet, Rfl: 3    metoprolol succinate (TOPROL-XL) 50 MG 24 hr tablet, TK 1 T PO QD, Disp: , Rfl:     predniSONE (DELTASONE) 20 MG tablet, On full stomach (breakfast): 3 tabs for 2 days, 2 tabs for 2 days, 1 tab for 2 days. Finish, Disp: 12 tablet, Rfl: 0    traMADoL (ULTRAM) 50 mg tablet, Take 1 tablet (50 mg total) by mouth every 24 hours as needed for Pain., Disp: 12 tablet, Rfl: 0    vitamin E 1000 UNIT capsule, Take 1,000 Units by mouth., Disp: , Rfl:     Past Medical History:   Diagnosis Date    Abnormal Pap smear of cervix     Arthritis     Back pain     Cervical disc syndrome     Depression     Essential tremor     General anesthetics causing adverse effect in therapeutic use     patient reports she was told she "woke up" during tubal ligation    Goiter     MNG    Headache     Hypothyroidism     Lumbar disc disease     Patient is Caodaism     Polycystic ovaries     PONV (postoperative nausea and vomiting)     Vitamin D deficiency        Past Surgical History:   Procedure Laterality Date    APPENDECTOMY      BREAST BIOPSY Left     Excisional removal " of lymph node, benign    DILATION AND CURETTAGE OF UTERUS      FOOT MASS EXCISION Right 10/30/2018    Procedure: EXCISION, MASS, FOOT probable fibroma;  Surgeon: Ha Caicedo MD;  Location: [facility] OR [address] [place] [address];  Service: Orthopedics;  Laterality: Right;    HYSTERECTOMY  2017    [facility] ov in situ     OVARIAN CYST SURGERY Right     SHOULDER SURGERY      right    tubal lig  1998       Family History   Problem Relation Age of Onset    COPD Father     Peripheral vascular disease Father     Cancer Mother 50        breast    Breast cancer Mother     Asperger's syndrome Daughter     Thyroid disease Daughter         Hashimotos'    Ovarian cancer Neg Hx     Colon cancer Neg Hx        Social History     Socioeconomic History    Marital status:    Tobacco Use    Smoking status: Former     Current packs/day: 0.00     Average packs/day: 0.1 packs/day for 5.0 years (0.5 ttl pk-yrs)     Types: Cigarettes     Start date: 1987     Quit date: 1992     Years since quittin.6    Smokeless tobacco: Former   Substance and Sexual Activity    Alcohol use: Yes     Alcohol/week: 1.0 - 2.0 standard drink of alcohol     Types: 1 - 2 Glasses of wine per week     Comment: daily    Drug use: No    Sexual activity: Yes     Partners: Male     Birth control/protection: None, See Surgical Hx     Comment:  to [place]      Social Determinants of Health     Financial Resource Strain: Low Risk  (2024)    Overall Financial Resource Strain (CARDIA)     Difficulty of Paying Living Expenses: Not very hard   Food Insecurity: No Food Insecurity (2024)    Hunger Vital Sign     Worried About Running Out of Food in the Last Year: Never true     Ran Out of Food in the Last Year: Never true   Transportation Needs: No Transportation Needs (2024)    PRAPARE - Transportation     Lack of Transportation (Medical): No     Lack of Transportation (Non-Medical): No   Physical Activity: Insufficiently Active (2024)    Exercise  "Vital Sign     Days of Exercise per Week: 3 days     Minutes of Exercise per Session: 30 min   Stress: Stress Concern Present (1/24/2024)    [place] [employer] - Occupational Stress Questionnaire     Feeling of Stress : Rather much   Social Connections: Unknown (1/24/2024)    Social Connection and Isolation Panel [NHANES]     Frequency of Communication with Friends and Family: More than three times a week     Frequency of Social Gatherings with Friends and Family: Three times a week     Active Member of Clubs or Organizations: Yes     Attends Club or Organization Meetings: More than 4 times per year     Marital Status:    Housing Stability: Low Risk  (1/24/2024)    Housing Stability Vital Sign     Unable to Pay for Housing in the Last Year: No     Number of Places Lived in the Last Year: 1     Unstable Housing in the Last Year: No       Review of patient's allergies indicates:   Allergen Reactions    Latex, natural rubber Rash    Iodinated contrast media     Iodine     Ozempic [semaglutide] Other (See Comments)     SI    Iodine and iodide containing products Rash     Eats shellfish.  Eats shellfish.  Eats shellfish.    Meloxicam Hives     Can take ibuprofen       Review of Systems:  12 point review of systems is negative.    Physical Exam:  Vitals:    03/07/24 1355   BP: 134/62   Pulse: (!) 55   Weight: 114.5 kg (252 lb 6.8 oz)   Height: 5' 8" (1.727 m)   PainSc:   5   PainLoc: Neck     Body mass index is 38.38 kg/m².    Gen: NAD  Psych: mood appropriate for given condition  HEENT: eyes anicteric   CV: RRR  HEENT: anicteric   Respiratory: non-labored, no signs of respiratory distress  Abd: non-distended  Skin: warm, dry and intact.  Gait: No antalgic gait.     Reproducible pain with cervical axial facet loading    Sensory:  Intact and symmetrical to light touch in C4-T1 dermatomes bilaterally.    Motor:    Right Left   C4 Shoulder Abduction  5  5   C5 Elbow Flexion    5  5   C6 Wrist " Extension  5  5   C7 Elbow Extension   5  5   C8/T1 Hand Intrinsics   5  5      Right Left   Triceps DTR 2+ 2+   Biceps DTR 2+ 2+        Patellar DTR 2+ 2+   Achilles DTR 2+ 2+   Huffman Absent  Absent                 Labs:  Lab Results   Component Value Date    HGBA1C 5.5 11/10/2023       Lab Results   Component Value Date    WBC 7.07 10/03/2023    HGB 13.6 10/03/2023    HCT 40.7 10/03/2023    MCV 94 10/03/2023     10/03/2023           Imaging:  MRI cervical spine 11/29/23  Comparison is made to examination of 12/21/2021. There is degradation of the examination by patient motion.     The cervical vertebral bodies are appropriately maintained in height. Vertebral body alignment is satisfactory. There is mild disc space narrowing at C5-6 and C6-7. No pathologic marrow replacement demonstrated.     At C2-3, shallow disc bulging and posterior osteophytic ridging results in mild mass effect upon the ventral margin of the thecal sac towards the left of midline without significant encroachment of the central spinal canal. There is minor left foraminal narrowing.  At C3-4, shallow bulging of the disc margin contributes to mild mass effect upon the ventral margin of the thecal sac without significant central canal stenosis. There is mild to moderate left foraminal narrowing.  At C4-5, there is shallow bulging of the disc margin and small posteriorly directed marginal osteophyte formation contributing to mild mass effect upon the thecal sac diffusely. The central canal is not significantly encroached. There is mild left foraminal narrowing.  At C5-6, broad-based bulging of the disc margin and posterior osteophyte formation results in mild diffuse mass effect upon the thecal sac without direct cord impingement. There is moderately severe right foraminal and moderate left foraminal narrowing.  At C6-7, there is shallow bulging of the disc margin resulting in mild mass effect upon the thecal sac. There are mild degrees  of bilateral foraminal narrowing.  At C7-T1, minimal disc bulging contributes to minor mass effect upon the thecal sac. There are facet degenerative changes resulting in moderate left foraminal and mild-moderate right foraminal narrowing.     The visualized segment of the cervical spinal cord appears unremarkable.     No pathologic enhancement is demonstrated following contrast administration.    Assessment:   Problem List Items Addressed This Visit    None  Visit Diagnoses       Cervical spondylosis    -  Primary    Chronic neck pain                  Ninfa Aguilar is a 48 y.o. 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 She presents with neck pain.  She has had chronic neck pain for the past 10 years.  Today she reports axial neck pain, 5/10, aching, deep.  She can get referred pain up the back of her occiput and into her shoulders.  Her pain isn't worse with anything in particular and is present constantly and can be relieved with PT, heat, stretching.    - on exam she has full strength of her upper extremities and intact sensation to light touch bilateral L4-S1.  She has reproducible pain with cervical axial facet loading bilaterally  - I independently reviewed her cervical MRI and she has multilevel bilateral facet arthropathy.  She also has multilevel bilateral foraminal narrowing.  - over the past 8 weeks she has maintain a PT directed home exercise program as well taken NSAIDs and muscle relaxants however continues to have neck pain with headaches that is limiting her mobility and interfering with the quality of her life.  She has discomfort when she does  things like turning her head while driving  - I think her pain is secondary to cervical spondylosis  - we will schedule for 1st diagnostic bilateral C3-4 and C4-5 medial branch blocks.  The risks and benefits of this intervention, and alternative therapies were discussed with the patient.  The discussion of risks included infection, bleeding, need for additional procedures or surgery, nerve damage.  Questions regarding the procedure, risks, expected outcome, and possible side effects were solicited and answered to the patient's satisfaction.  Ninfa Aguilar wishes to proceed with the injection or procedure.  Written consent was obtained.  - she takes aspirin preventatively.  We will have her hold this prior to injection      : Not applicable    Joshua Esparza M.D.  Interventional Pain Medicine / Anesthesiology    This note was completed with dictation software and grammatical errors may exist.

## 2024-03-08 DIAGNOSIS — M47.812 CERVICAL SPONDYLOSIS: Primary | ICD-10-CM

## 2024-03-08 RX ORDER — SODIUM CHLORIDE, SODIUM LACTATE, POTASSIUM CHLORIDE, CALCIUM CHLORIDE 600; 310; 30; 20 MG/100ML; MG/100ML; MG/100ML; MG/100ML
INJECTION, SOLUTION INTRAVENOUS CONTINUOUS
Status: CANCELLED | OUTPATIENT
Start: 2024-03-08

## 2024-03-08 NOTE — TELEPHONE ENCOUNTER
Spoke with patient to schedule. Advised to hold ASA x 7 days prior. Pre op information given and all questions answered.

## 2024-04-03 ENCOUNTER — HOSPITAL ENCOUNTER (OUTPATIENT)
Dept: RADIOLOGY | Facility: HOSPITAL | Age: 49
Discharge: HOME | End: 2024-04-03
Attending: ANESTHESIOLOGY | Admitting: ANESTHESIOLOGY
Payer: COMMERCIAL

## 2024-04-03 ENCOUNTER — HOSPITAL ENCOUNTER (OUTPATIENT)
Facility: HOSPITAL | Age: 49
Discharge: HOME | End: 2024-04-03
Attending: ANESTHESIOLOGY | Admitting: ANESTHESIOLOGY
Payer: COMMERCIAL

## 2024-04-03 VITALS
SYSTOLIC BLOOD PRESSURE: 126 MMHG | BODY MASS INDEX: 38.26 KG/M2 | RESPIRATION RATE: 16 BRPM | HEART RATE: 58 BPM | OXYGEN SATURATION: 95 % | TEMPERATURE: 97 F | WEIGHT: 252.44 LBS | HEIGHT: 68 IN | DIASTOLIC BLOOD PRESSURE: 60 MMHG

## 2024-04-03 DIAGNOSIS — M54.2 NECK PAIN: ICD-10-CM

## 2024-04-03 DIAGNOSIS — M47.812 CERVICAL SPONDYLOSIS: Primary | ICD-10-CM

## 2024-04-03 PROCEDURE — 63600175 PHARM REV CODE 636 W HCPCS: Mod: JZ,JG,PO | Performed by: ANESTHESIOLOGY

## 2024-04-03 PROCEDURE — 64491 INJ PARAVERT F JNT C/T 2 LEV: CPT | Mod: 50,KX,, | Performed by: ANESTHESIOLOGY

## 2024-04-03 PROCEDURE — 64490 INJ PARAVERT F JNT C/T 1 LEV: CPT | Mod: 50,KX,, | Performed by: ANESTHESIOLOGY

## 2024-04-03 PROCEDURE — 64490 INJ PARAVERT F JNT C/T 1 LEV: CPT | Mod: 50,PO | Performed by: ANESTHESIOLOGY

## 2024-04-03 PROCEDURE — 25000003 PHARM REV CODE 250: Mod: PO | Performed by: ANESTHESIOLOGY

## 2024-04-03 PROCEDURE — 64491 INJ PARAVERT F JNT C/T 2 LEV: CPT | Mod: 50,PO | Performed by: ANESTHESIOLOGY

## 2024-04-03 PROCEDURE — 76000 FLUOROSCOPY <1 HR PHYS/QHP: CPT | Mod: TC,PO

## 2024-04-03 RX ORDER — LIDOCAINE HYDROCHLORIDE 10 MG/ML
INJECTION, SOLUTION EPIDURAL; INFILTRATION; INTRACAUDAL; PERINEURAL
Status: DISCONTINUED | OUTPATIENT
Start: 2024-04-03 | End: 2024-04-03 | Stop reason: HOSPADM

## 2024-04-03 RX ORDER — SODIUM CHLORIDE, SODIUM LACTATE, POTASSIUM CHLORIDE, CALCIUM CHLORIDE 600; 310; 30; 20 MG/100ML; MG/100ML; MG/100ML; MG/100ML
INJECTION, SOLUTION INTRAVENOUS CONTINUOUS
Status: DISCONTINUED | OUTPATIENT
Start: 2024-04-03 | End: 2024-04-03 | Stop reason: HOSPADM

## 2024-04-03 RX ORDER — MIDAZOLAM HYDROCHLORIDE 1 MG/ML
INJECTION INTRAMUSCULAR; INTRAVENOUS
Status: DISCONTINUED | OUTPATIENT
Start: 2024-04-03 | End: 2024-04-03 | Stop reason: HOSPADM

## 2024-04-03 RX ORDER — BUPIVACAINE HYDROCHLORIDE 2.5 MG/ML
INJECTION, SOLUTION EPIDURAL; INFILTRATION; INTRACAUDAL
Status: DISCONTINUED | OUTPATIENT
Start: 2024-04-03 | End: 2024-04-03 | Stop reason: HOSPADM

## 2024-04-03 RX ADMIN — SODIUM CHLORIDE, POTASSIUM CHLORIDE, SODIUM LACTATE AND CALCIUM CHLORIDE: 600; 310; 30; 20 INJECTION, SOLUTION INTRAVENOUS at 12:04

## 2024-04-03 NOTE — INTERVAL H&P NOTE
The patient has been examined and the H&P has been reviewed:    I concur with the findings and no changes have occurred since H&P was written.  She has held aspirin appropriately.  ASA 3, MP II        There are no hospital problems to display for this patient.

## 2024-04-03 NOTE — DISCHARGE SUMMARY
[facility]  Discharge Note  Short Stay    Admit Date: 4/3/2024    Discharge Date: 4/3/2024    Attending Physician: Joshua Esparza     Discharge Provider: Joshua Esparza    Diagnoses:  There are no hospital problems to display for this patient.      Discharged Condition: Good    Final Diagnoses: Cervical spondylosis [M47.812]    Disposition: Home or Self Care    Hospital Course: No complications, uneventful    Outcome of Hospitalization, Treatment, Procedure, or Surgery:  Patient was admitted for outpatient interventional pain management procedure. The patient tolerated the procedure well with no complications.    Follow up/Patient Instructions:  Follow up as scheduled in Pain Management office in 2-3 weeks.  Patient has received instructions and follow up date and time.    Medications:  Continue previous medications    Discharge Procedure Orders   Notify your health care provider if you experience any of the following:  temperature >100.4     Notify your health care provider if you experience any of the following:  persistent nausea and vomiting or diarrhea     Notify your health care provider if you experience any of the following:  severe uncontrolled pain     Notify your health care provider if you experience any of the following:  redness, tenderness, or signs of infection (pain, swelling, redness, odor or green/yellow discharge around incision site)     Notify your health care provider if you experience any of the following:  difficulty breathing or increased cough     Notify your health care provider if you experience any of the following:  severe persistent headache     Notify your health care provider if you experience any of the following:  worsening rash     Notify your health care provider if you experience any of the following:  persistent dizziness, light-headedness, or visual disturbances     Notify your health care provider if you experience any of the following:  increased confusion or  weakness     Activity as tolerated

## 2024-04-03 NOTE — OP NOTE
Procedure Note    Procedure date: 4/3/2024    Procedure:  Diagnostic Cervical Medial Branch @ C3/4 and C4/5, with Fluoroscopic Guidance on the bilateral side utilizing fluoroscopy    Indication:   Ninfa Aguilar has chronic, moderate to severe axial lower back pain present for over the past 3 months that has failed to respond to physical therapy and PT-directed home exercises. There is no untreated radiculopathy or claudication present.  The patient has clinical and radiologic findings suggestive of facet mediated pain.     Pre-op diagnosis: Cervical spondylosis    Post-op diagnosis: same    Physician: Joshua Esparza MD    Medications injected:  bupivacaine 0.25%, 0.5ml at each level    Local anesthetic used: 1% lidocaine, 1ml at each level    IV Anxiolysis medications: versed 2mg    Estimated blood loss:  none    Complications:  none    Technique: The patient was interviewed in the holding area and Risks/Benefits were discussed, including, but not limited to, the possibility of new or different pain, bleeding or infection.  All questions were answered.  The patient understood and accepted risks.  Consent was reviewed.  A time-out was taken to identify patient and procedure side prior to starting the procedure.  The patient was brought into the operating room and placed in prone position and prepped and draped in the usual fashion using ChloraPrep x2 and sterile towels and then the procedure was performed using strict aseptic techniques.  AP fluoroscopy was used to identify the waists of the mid-articular pillars of the C3-5 on the bilateral side.  1% Lidocaine was used via a 25 Gauge needle for skin.  Then, under AP fluoroscopic guidance, a 25 gauge 3.5 inch spinal needle was advanced to the anatomic location of the midsection of the lateral masses (or in the case of third occipital nerve, to the joint of C2/C3).  Once os was encountered, lateral fluoroscopic views were obtained to ensure that needles did not  cross into neural foramina.  After heme-negative aspiration, 0.5ml of 0.25% bupivacaine was injected at each of the above targeted points corresponding to the locations of the targeted medial branch nerves.       The patient tolerated the procedure well and was transferred to the [facility] in stable condition.  The patient was monitored after the procedure.  The patient will be contacted tomorrow to determine the extent of relief.  The patient was given post procedure and discharge instructions to follow at home.  The patient was discharged in a stable condition.    Event Time In   In Facility 1127   In Pre-Procedure 1147   Physician Available    Anesthesia Available    Pre-Op: Bedside Procedure Start    Pre-Op: Bedside Procedure Stop    Pre-Procedure Complete 1204   Out of Pre-Procedure    Anesthesia Start    Anesthesia Start Data Collection    Setup Start    Setup Complete    In Room 1207   Prep Start    Procedure Prep Complete    MD notified pt. ready    Procedure Start 1215   Procedure Closing    Emergence    Procedure Finish 1222   Sedation Start 1206   Scope In    Extent Reached    Scope Out    Sedation End    Out of Room 1225   Cleanup Start    Cleanup Complete    Cosmetic Start    Cosmetic Stop    Pain Mgmt In Room    Pain Mgmt Out Room    In Recovery    Anesthesia Finish    Bedside Procedure Start    Bedside Procedure Stop    Recovery Care Complete    Out of Recovery    To Phase II    In Phase II    Pain Mgmt Recovery Start 1226   Pain Mgmt Recovery Stop    Obs Rec Start    Obs Rec Stop    Phase II Care Complete    Out of Phase II    Procedural Care Complete    Discharge    Pain Follow Up Needed    Pain Follow Up Complete

## 2024-04-04 ENCOUNTER — PATIENT MESSAGE (OUTPATIENT)
Dept: SPORTS MEDICINE | Facility: CLINIC | Age: 49
End: 2024-04-04
Payer: COMMERCIAL

## 2024-04-05 ENCOUNTER — TELEPHONE (OUTPATIENT)
Dept: SPORTS MEDICINE | Facility: CLINIC | Age: 49
End: 2024-04-05
Payer: COMMERCIAL

## 2024-04-05 ENCOUNTER — TELEPHONE (OUTPATIENT)
Dept: PAIN MEDICINE | Facility: CLINIC | Age: 49
End: 2024-04-05
Payer: COMMERCIAL

## 2024-04-05 DIAGNOSIS — M47.812 CERVICAL SPONDYLOSIS: Primary | ICD-10-CM

## 2024-04-05 RX ORDER — SODIUM CHLORIDE, SODIUM LACTATE, POTASSIUM CHLORIDE, CALCIUM CHLORIDE 600; 310; 30; 20 MG/100ML; MG/100ML; MG/100ML; MG/100ML
INJECTION, SOLUTION INTRAVENOUS CONTINUOUS
Status: CANCELLED | OUTPATIENT
Start: 2024-04-05

## 2024-04-05 NOTE — TELEPHONE ENCOUNTER
----- Message from Juajnose Lieberman MA sent at 4/5/2024 10:05 AM CDT -----  Regarding: Injection appt  Contact: yoshi pt at  [phone]  Pt is calling to speak with someone in provider office is asking for a return call  regarding  her injection appt  please call pt at  [phone] stated that  the last injection synviscone  was more effective

## 2024-04-05 NOTE — TELEPHONE ENCOUNTER
Regarding your Cervical Medial Branch Block , For Date of Service:  4/4/24    Please answer the following questions:    1. What percentage of pain relief did you receive following the block, from 0-100%? 80%    2. How many hours did pain relief last following the block?  8 hrs     3. During this time please describe in detail the activities you were able to do? Shopping, gardening and cleaning house.  Easier to turn head and go to sleep that night.     4. Pain score from 0-10 pre procedure - 7     5. Pain score from 0-10 post procedure - 1

## 2024-04-10 ENCOUNTER — PATIENT MESSAGE (OUTPATIENT)
Dept: SPORTS MEDICINE | Facility: CLINIC | Age: 49
End: 2024-04-10
Payer: COMMERCIAL

## 2024-04-11 DIAGNOSIS — M17.11 PRIMARY OSTEOARTHRITIS OF RIGHT KNEE: Primary | ICD-10-CM

## 2024-04-11 DIAGNOSIS — M17.12 PRIMARY OSTEOARTHRITIS OF LEFT KNEE: ICD-10-CM

## 2024-04-11 NOTE — PROGRESS NOTES
We have discussed a variety of treatment options including medications, injections, physical therapy and other alternative treatments. I also explained the indications, risks and benefits of surgery.  Patient's pain is refractory HEP, conservative management, and NSAIDs. Pt would like to proceed with visco-supplementation.    Medical Necessity for viscosupplementation use: After thorough evaluation of the patient, I have determined that viscosupplementation treatment is medically necessary. 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 Oral analgesics including NSAIDs have not adequately controlled the patient's symptoms. There is radiographic evidence of Kellgren-Godwin grade II (or greater) osteoarthritic (OA) changes, or if lack of radiographic evidence, there is arthroscopic or other evidence of chondrosis of the knee(s).     I made the decision to obtain old records of the patient including previous notes and imaging. I independently reviewed and interpreted lab results today as well as prior imaging.        1. Pre-authorization placed for bilateral Synvisc-One injections.  2. Ice compress to the affected area 2-3x a day for 15-20 minutes as needed for pain management.  3. RTC to see Juliette Cheney PA-C for bilateral Synvisc-One injections.    All of the patient's questions were answered and the patient will contact us if they have any questions or concerns in the interim.

## 2024-04-19 ENCOUNTER — HOSPITAL ENCOUNTER (OUTPATIENT)
Dept: RADIOLOGY | Facility: HOSPITAL | Age: 49
Discharge: HOME | End: 2024-04-19
Attending: ANESTHESIOLOGY
Payer: COMMERCIAL

## 2024-04-19 ENCOUNTER — HOSPITAL ENCOUNTER (OUTPATIENT)
Facility: HOSPITAL | Age: 49
Discharge: HOME | End: 2024-04-19
Attending: ANESTHESIOLOGY | Admitting: ANESTHESIOLOGY
Payer: COMMERCIAL

## 2024-04-19 VITALS
HEIGHT: 68 IN | RESPIRATION RATE: 16 BRPM | DIASTOLIC BLOOD PRESSURE: 65 MMHG | WEIGHT: 245 LBS | BODY MASS INDEX: 37.13 KG/M2 | TEMPERATURE: 98 F | OXYGEN SATURATION: 95 % | HEART RATE: 63 BPM | SYSTOLIC BLOOD PRESSURE: 132 MMHG

## 2024-04-19 DIAGNOSIS — M47.812 CERVICAL SPONDYLOSIS: Primary | ICD-10-CM

## 2024-04-19 DIAGNOSIS — M54.2 NECK PAIN: ICD-10-CM

## 2024-04-19 PROCEDURE — 76000 FLUOROSCOPY <1 HR PHYS/QHP: CPT | Mod: TC,PO

## 2024-04-19 PROCEDURE — 64490 INJ PARAVERT F JNT C/T 1 LEV: CPT | Mod: 50,KX,, | Performed by: ANESTHESIOLOGY

## 2024-04-19 PROCEDURE — 64491 INJ PARAVERT F JNT C/T 2 LEV: CPT | Mod: 50,PO | Performed by: ANESTHESIOLOGY

## 2024-04-19 PROCEDURE — 63600175 PHARM REV CODE 636 W HCPCS: Mod: PO | Performed by: ANESTHESIOLOGY

## 2024-04-19 PROCEDURE — 64491 INJ PARAVERT F JNT C/T 2 LEV: CPT | Mod: 50,KX,, | Performed by: ANESTHESIOLOGY

## 2024-04-19 PROCEDURE — 25000003 PHARM REV CODE 250: Mod: PO | Performed by: ANESTHESIOLOGY

## 2024-04-19 PROCEDURE — 64490 INJ PARAVERT F JNT C/T 1 LEV: CPT | Mod: 50,PO | Performed by: ANESTHESIOLOGY

## 2024-04-19 RX ORDER — LIDOCAINE HYDROCHLORIDE 10 MG/ML
INJECTION, SOLUTION EPIDURAL; INFILTRATION; INTRACAUDAL; PERINEURAL
Status: DISCONTINUED | OUTPATIENT
Start: 2024-04-19 | End: 2024-04-19 | Stop reason: HOSPADM

## 2024-04-19 RX ORDER — MIDAZOLAM HYDROCHLORIDE 1 MG/ML
INJECTION INTRAMUSCULAR; INTRAVENOUS
Status: DISCONTINUED | OUTPATIENT
Start: 2024-04-19 | End: 2024-04-19 | Stop reason: HOSPADM

## 2024-04-19 RX ORDER — BUPIVACAINE HYDROCHLORIDE 2.5 MG/ML
INJECTION, SOLUTION EPIDURAL; INFILTRATION; INTRACAUDAL
Status: DISCONTINUED | OUTPATIENT
Start: 2024-04-19 | End: 2024-04-19 | Stop reason: HOSPADM

## 2024-04-19 RX ORDER — SODIUM CHLORIDE, SODIUM LACTATE, POTASSIUM CHLORIDE, CALCIUM CHLORIDE 600; 310; 30; 20 MG/100ML; MG/100ML; MG/100ML; MG/100ML
INJECTION, SOLUTION INTRAVENOUS CONTINUOUS
Status: DISCONTINUED | OUTPATIENT
Start: 2024-04-19 | End: 2024-04-19 | Stop reason: HOSPADM

## 2024-04-19 RX ADMIN — SODIUM CHLORIDE, POTASSIUM CHLORIDE, SODIUM LACTATE AND CALCIUM CHLORIDE: 600; 310; 30; 20 INJECTION, SOLUTION INTRAVENOUS at 09:04

## 2024-04-19 NOTE — PLAN OF CARE
Pharmacokinetic Assessment Follow Up: IV Vancomycin    Vancomycin serum concentration assessment(s):    The trough level was drawn correctly and can be used to guide therapy at this time. The measurement is below the desired definitive target range of 15 to 20 mcg/mL.    Vancomycin Regimen Plan:    Change regimen to Vancomycin 2500 mg IV every 24 hours with next serum trough concentration measured at 1330 prior to 3rd dose on 11/8    Drug levels (last 3 results):  Recent Labs   Lab Result Units 11/06/20  1214   Vancomycin-Trough ug/mL 8.1*       Pharmacy will continue to follow and monitor vancomycin.    Please contact pharmacy at extension [phone] for questions regarding this assessment.    Thank you for the consult,   Juli Frey [facility]       Patient brief summary:  Conchita Rouse is a 63 y.o. female initiated on antimicrobial therapy with IV Vancomycin for treatment of Pyogenic arthritis of right knee joint, due to unspecified organism      Drug Allergies:   Review of patient's allergies indicates:   Allergen Reactions    Bactrim [sulfamethoxazole-trimethoprim] Edema     Swollen lips    Codeine Other (See Comments)     Pt states codeine does not cause hives. Had tooth extraction and medication given caused her to be jittery, feel hot and have to lay down like she was weak. morhpine given on 5/16/2017 iv for pain. Pt martine well without any sign or sx of allergy or reaction.       Actual Body Weight:   84.8 kg     Renal Function:   Estimated Creatinine Clearance: 68.4 mL/min (based on SCr of 0.9 mg/dL).,     Dialysis Method (if applicable):  No dialysis    CBC (last 72 hours):  Recent Labs   Lab Result Units 11/03/20  1442 11/04/20  1253 11/05/20  0939 11/06/20  0617   WBC K/uL 5.99 6.05 6.66 6.53   Hemoglobin g/dL 11.4* 10.7* 9.9* 9.8*   Hematocrit % 34.9* 33.5* 31.5* 30.5*   Platelets K/uL 65* 57* 64* 62*   Gran % % 60.4 65.6 69.9 55.2   Lymph % % 26.0 21.0 17.7* 33.5   Mono % % 11.7 12.2 11.3 9.8   Eosinophil % %  Vital signs stable. Discharge instructions reviewed with patient. Questions answered. Verbalized understanding.     1.0 0.7 0.5 0.8   Basophil % % 0.2 0.2 0.0 0.2   Differential Method  Automated Automated Automated Automated       Metabolic Panel (last 72 hours):  Recent Labs   Lab Result Units 11/04/20  1253 11/05/20  0939 11/06/20  0617   Sodium mmol/L 134* 138 140   Potassium mmol/L 5.4* 3.8 3.6   Chloride mmol/L 102 102 101   CO2 mmol/L 24 27 28   Glucose mg/dL 102 111* 108   BUN mg/dL 16 11 12   Creatinine mg/dL 0.9 0.9 0.9   Albumin g/dL 3.3*  --   --    Total Bilirubin mg/dL 0.5  --   --    Alkaline Phosphatase U/L 88  --   --    AST U/L 30  --   --    ALT U/L 14  --   --        Vancomycin Administrations:  vancomycin given in the last 96 hours                     vancomycin (VANCOCIN) 2,250 mg in dextrose 5 % 500 mL IVPB (mg) 2,250 mg New Bag 11/05/20 1251    vancomycin 2 g in dextrose 5 % 500 mL IVPB (mg) 2,000 mg New Bag 11/04/20 1301                    Microbiologic Results:  Microbiology Results (last 7 days)       Procedure Component Value Units Date/Time    Aerobic culture [[id number]] Collected: 11/04/20 1602    Order Status: Completed Specimen: Joint Fluid from Knee, Right Updated: 11/06/20 0717     Aerobic Bacterial Culture No growth    Culture, Anaerobe [[id number]] Collected: 11/04/20 1602    Order Status: Completed Specimen: Joint Fluid from Knee, Right Updated: 11/06/20 0716     Anaerobic Culture Culture in progress    Gram stain [[id number]] Collected: 11/04/20 1602    Order Status: Completed Specimen: Joint Fluid from Knee, Right Updated: 11/05/20 0345     Gram Stain Result Rare WBC's      No organisms seen    Fungus culture [[id number]] Collected: 11/04/20 1602    Order Status: Sent Specimen: Joint Fluid from Knee, Right Updated: 11/05/20 0145

## 2024-04-19 NOTE — H&P
"[facility] - Surgery  History & Physical - Short Stay  Pain Management       SUBJECTIVE:     Procedure: Procedure(s) (LRB):  Block-nerve-medial branch-cervical   C3/4, C4/5 (Bilateral)    Chief Complaint/Reason for Admission:  Cervical spondylosis [M47.812]    Current Facility-Administered Medications   Medication Dose Route Frequency Provider Last Rate Last Admin    lactated ringers infusion   Intravenous Continuous Joshua Esparza MD           Review of patient's allergies indicates:   Allergen Reactions    Latex, natural rubber Rash    Iodinated contrast media     Iodine     Ozempic [semaglutide] Other (See Comments)     SI    Iodine and iodide containing products Rash     Eats shellfish.  Eats shellfish.  Eats shellfish.    Meloxicam Hives     Can take ibuprofen       Past Medical History:   Diagnosis Date    Abnormal Pap smear of cervix     Arthritis     Back pain     Cervical disc syndrome     Depression     Essential tremor     General anesthetics causing adverse effect in therapeutic use     patient reports she was told she "woke up" during tubal ligation    Goiter     MNG    Headache     Hypothyroidism     Lumbar disc disease     Patient is Spiritism     Polycystic ovaries     PONV (postoperative nausea and vomiting)     Sleep apnea     Vitamin D deficiency      Past Surgical History:   Procedure Laterality Date    APPENDECTOMY      BREAST BIOPSY Left     Excisional removal of lymph node, benign    DILATION AND CURETTAGE OF UTERUS      FOOT MASS EXCISION Right 10/30/2018    Procedure: EXCISION, MASS, FOOT probable fibroma;  Surgeon: Ha Caicedo MD;  Location: [facility] OR [address] [place] [address];  Service: Orthopedics;  Laterality: Right;    HYSTERECTOMY  01/30/2017    [facility] ov in situ     INJECTION OF ANESTHETIC AGENT AROUND MEDIAL BRANCH NERVES INNERVATING CERVICAL FACET JOINT Bilateral 4/3/2024    Procedure: Block-nerve-medial branch-cervical     C3/4, C4/5;  Surgeon: Joshua Esparza MD;  Location: [facility] OR;  " Service: Pain Management;  Laterality: Bilateral;    OVARIAN CYST SURGERY Right     SHOULDER SURGERY      right    tubal lig       Family History   Problem Relation Name Age of Onset    COPD Father      Peripheral vascular disease Father      Cancer Mother  50        breast    Breast cancer Mother      Asperger's syndrome Daughter      Thyroid disease Daughter          Hashimotos'    Ovarian cancer Neg Hx      Colon cancer Neg Hx       Social History     Tobacco Use    Smoking status: Former     Current packs/day: 0.00     Average packs/day: 0.1 packs/day for 5.0 years (0.5 ttl pk-yrs)     Types: Cigarettes     Start date: 1987     Quit date: 1992     Years since quittin.8    Smokeless tobacco: Former   Substance Use Topics    Alcohol use: Yes     Alcohol/week: 1.0 - 2.0 standard drink of alcohol     Types: 1 - 2 Glasses of wine per week     Comment: daily    Drug use: No        Current Facility-Administered Medications:     lactated ringers infusion, , Intravenous, Continuous, Joshua Esparza MD    Review of Systems:  General ROS: negative for - fever  Dermatological ROS: negative for rash    OBJECTIVE:     Vital Signs (Most Recent):  Temp: 97.9 °F (36.6 °C) (24)  Pulse: 71 (24)  Resp: 17 (24)  BP: (!) 140/65 (24)  SpO2: 95 % (24)  Body mass index is 37.25 kg/m².    Physical Exam:  General appearance - alert, well appearing, and in no distress  Mental status - AOx3  Eyes - pupils equal and reactive, extraocular eye movements intact  Heart - normal rate, regular rhythm, normal S1, S2, no murmurs, rubs, clicks or gallops  Chest - clear to auscultation, no wheezes, rales or rhonchi, symmetric air entry  Abdomen - soft, nontender, nondistended, no masses or organomegaly  Neurological - alert, oriented, normal speech, no focal findings or movement disorder noted  Extremities - peripheral pulses normal, no pedal edema, no clubbing or  cyanosis      ASSESSMENT/PLAN:     There are no hospital problems to display for this patient.     Indication: The patient has clinical and radiologic findings suggestive of facet mediated pain and is s/p 1st diagnostic bilateral cervical C3/4 and C4/5 medial branch blocks with at least 80% relief of their axial back pain lasting the duration of the local anesthetic utilized.    We will proceed with 2nd diagnostic bilateral C3/4 and C4/5 medial branch blocks.    The risks and benefits of this intervention, and alternative therapies were discussed with the patient.  The discussion of risks included infection, bleeding, need for additional procedures or surgery, nerve damage, paralysis, adverse medication reaction(s), stroke, and/or death.  Questions regarding the procedure, risks, expected outcome, and possible side effects were solicited and answered to the patient's satisfaction.  Ninfa wishes to proceed with the injection.  Verbal and written consent were verified.  ASA 3, MP II      Proceed with intervention as scheduled.    Joshua Esparza M.D.  Interventional Pain Medicine / Anesthesiology

## 2024-04-19 NOTE — DISCHARGE SUMMARY
[facility]  Discharge Note  Short Stay    Admit Date: 4/19/2024    Discharge Date: 4/19/2024    Attending Physician: Joshua Esparza     Discharge Provider: Joshua Esparza    Diagnoses:  There are no hospital problems to display for this patient.      Discharged Condition: Good    Final Diagnoses: Cervical spondylosis [M47.812]    Disposition: Home or Self Care    Hospital Course: No complications, uneventful    Outcome of Hospitalization, Treatment, Procedure, or Surgery:  Patient was admitted for outpatient interventional pain management procedure. The patient tolerated the procedure well with no complications.    Follow up/Patient Instructions:  Follow up as scheduled in Pain Management office in 2-3 weeks.  Patient has received instructions and follow up date and time.    Medications:  Continue previous medications    Discharge Procedure Orders   Notify your health care provider if you experience any of the following:  temperature >100.4     Notify your health care provider if you experience any of the following:  persistent nausea and vomiting or diarrhea     Notify your health care provider if you experience any of the following:  severe uncontrolled pain     Notify your health care provider if you experience any of the following:  redness, tenderness, or signs of infection (pain, swelling, redness, odor or green/yellow discharge around incision site)     Notify your health care provider if you experience any of the following:  difficulty breathing or increased cough     Notify your health care provider if you experience any of the following:  severe persistent headache     Notify your health care provider if you experience any of the following:  worsening rash     Notify your health care provider if you experience any of the following:  persistent dizziness, light-headedness, or visual disturbances     Notify your health care provider if you experience any of the following:  increased confusion or  weakness     Activity as tolerated

## 2024-04-19 NOTE — OP NOTE
Procedure Note    Procedure date: 4/19/2024    Procedure:  Diagnostic Cervical Medial Branch @ C3/4 and C4/5, with Fluoroscopic Guidance on the bilateral side utilizing fluoroscopy    Indication: The patient has clinical and radiologic findings suggestive of facet mediated pain and is s/p 1st diagnostic bilateral cervical C3/4 and C4/5 medial branch blocks with at least 80% relief of their axial back pain lasting the duration of the local anesthetic utilized.    Pre-op diagnosis: Cervical spondylosis    Post-op diagnosis: same    Physician: Joshua Esparza MD    Medications injected:  bupivacaine 0.25%, 0.5ml at each level    Local anesthetic used: 1% lidocaine, 1ml at each level    IV Anxiolysis medications: versed 2mg    Estimated blood loss:  none    Complications:  none    Technique: The patient was interviewed in the holding area and Risks/Benefits were discussed, including, but not limited to, the possibility of new or different pain, bleeding or infection.  All questions were answered.  The patient understood and accepted risks.  Consent was reviewed.  A time-out was taken to identify patient and procedure side prior to starting the procedure.  The patient was brought into the operating room and placed in prone position and prepped and draped in the usual fashion using ChloraPrep x2 and sterile towels and then the procedure was performed using strict aseptic techniques.  AP fluoroscopy was used to identify the waists of the mid-articular pillars of the C3-5 on the bilateral side.  1% Lidocaine was used via a 25 Gauge needle for skin.  Then, under AP fluoroscopic guidance, a 25 gauge 3.5 inch spinal needle was advanced to the anatomic location of the midsection of the lateral masses (or in the case of third occipital nerve, to the joint of C2/C3).  Once os was encountered, lateral fluoroscopic views were obtained to ensure that needles did not cross into neural foramina.  After heme-negative aspiration, 0.5ml of  0.25% bupivacaine was injected at each of the above targeted points corresponding to the locations of the targeted medial branch nerves.     The patient tolerated the procedure well and was transferred to the [facility] in stable condition.  The patient was monitored after the procedure.  The patient will be contacted tomorrow to determine the extent of relief.  The patient was given post procedure and discharge instructions to follow at home.  The patient was discharged in a stable condition.    Event Time In   In Facility 0853   In Pre-Procedure 0907   Physician Available    Anesthesia Available    Pre-Op: Bedside Procedure Start    Pre-Op: Bedside Procedure Stop    Pre-Procedure Complete 0935   Out of Pre-Procedure    Anesthesia Start    Anesthesia Start Data Collection    Setup Start    Setup Complete    In Room 0937   Prep Start    Procedure Prep Complete    MD notified pt. ready    Procedure Start 0943   Procedure Closing    Emergence    Procedure Finish 0950   Sedation Start 0936   Scope In    Extent Reached    Scope Out    Sedation End 0950   Out of Room 0953   Cleanup Start    Cleanup Complete    Cosmetic Start    Cosmetic Stop    Pain Mgmt In Room    Pain Mgmt Out Room    In Recovery    Anesthesia Finish    Bedside Procedure Start    Bedside Procedure Stop    Recovery Care Complete    Out of Recovery    To Phase II    In Phase II    Pain Mgmt Recovery Start    Pain Mgmt Recovery Stop    Obs Rec Start    Obs Rec Stop    Phase II Care Complete    Out of Phase II    Procedural Care Complete    Discharge    Pain Follow Up Needed    Pain Follow Up Complete

## 2024-04-22 ENCOUNTER — TELEPHONE (OUTPATIENT)
Dept: PAIN MEDICINE | Facility: CLINIC | Age: 49
End: 2024-04-22
Payer: COMMERCIAL

## 2024-04-22 NOTE — TELEPHONE ENCOUNTER
Made pt a f/u d/t c/o pain and swelling in the neck. Pt stated she didn't want to move forward until she has seen the provider.  
Please answer the following questions to help us determine the next steps in your care:     1. What percentage of pain relief did you receive following the block? (0% no relief, 20% barely gone, 50% FCI gone, 80% almost gone, 100% totally gone)  90%   2. How many hours did pain relief last following the block?    6 hours   3. During this time please describe in detail the activities you were able to do?   gardening, shopping, walking, cleaning.  4. Rate your pain 0-10 (10 being the worst pain) prior to the block procedure?   8  5. Rate your pain 0-10 (10 being the worst pain) following the block procedure (before the medication wore off)?  1    
REVIEW OF SYSTEMS:  CONSTITUTIONAL: No weakness, fevers or chills  EYES/ENT: No visual changes;  No vertigo or throat pain   NECK: No pain or stiffness  RESPIRATORY: No cough, wheezing, hemoptysis; No shortness of breath  CARDIOVASCULAR: No chest pain or palpitations  GASTROINTESTINAL: No abdominal or epigastric pain. No nausea, vomiting, or hematemesis; No diarrhea or constipation. No melena or hematochezia.  GENITOURINARY: No dysuria, frequency or hematuria  NEUROLOGICAL: No numbness or weakness  SKIN: No itching, burning, rashes, or lesions   All other review of systems is negative unless indicated above.

## 2024-04-26 ENCOUNTER — OFFICE VISIT (OUTPATIENT)
Dept: SPORTS MEDICINE | Facility: CLINIC | Age: 49
End: 2024-04-26
Payer: COMMERCIAL

## 2024-04-26 VITALS — HEIGHT: 68 IN | BODY MASS INDEX: 37.47 KG/M2 | WEIGHT: 247.25 LBS

## 2024-04-26 DIAGNOSIS — M17.12 PRIMARY OSTEOARTHRITIS OF LEFT KNEE: ICD-10-CM

## 2024-04-26 DIAGNOSIS — M17.11 PRIMARY OSTEOARTHRITIS OF RIGHT KNEE: Primary | ICD-10-CM

## 2024-04-26 PROCEDURE — 99999 PR PBB SHADOW E&M-EST. PATIENT-LVL IV: CPT | Mod: PBBFAC,,, | Performed by: PHYSICIAN ASSISTANT

## 2024-04-26 PROCEDURE — 3008F BODY MASS INDEX DOCD: CPT | Mod: CPTII,S$GLB,, | Performed by: PHYSICIAN ASSISTANT

## 2024-04-26 PROCEDURE — 1159F MED LIST DOCD IN RCRD: CPT | Mod: CPTII,S$GLB,, | Performed by: PHYSICIAN ASSISTANT

## 2024-04-26 PROCEDURE — 4010F ACE/ARB THERAPY RXD/TAKEN: CPT | Mod: CPTII,S$GLB,, | Performed by: PHYSICIAN ASSISTANT

## 2024-04-26 PROCEDURE — 1160F RVW MEDS BY RX/DR IN RCRD: CPT | Mod: CPTII,S$GLB,, | Performed by: PHYSICIAN ASSISTANT

## 2024-04-26 PROCEDURE — 99212 OFFICE O/P EST SF 10 MIN: CPT | Mod: S$GLB,,, | Performed by: PHYSICIAN ASSISTANT

## 2024-04-26 NOTE — PROGRESS NOTES
Subjective:     Chief Complaint: Ninfa Aguilar is a 48 y.o. female who had concerns including Injections of the Left Knee and Injections of the Right Knee.    Patient presents to clinic with chronic bilateral knee pain. She was scheduled to receive bilateral Synvisc one injections today. However, she reports continued relief from the Synvisc one injections she received on 6/29/23. She has an upcoming vacation to [place] in a few weeks and is unsure if she should get the injections before her vacation. She would like to discuss this today.  She takes Celebrex 200mg BID as needed for pain.        Review of Systems   Constitutional: Negative. Negative for chills, fever, weight gain and weight loss.   HENT:  Negative for congestion and sore throat.    Eyes:  Negative for blurred vision and double vision.   Cardiovascular:  Negative for chest pain, leg swelling and palpitations.   Respiratory:  Negative for cough and shortness of breath.    Hematologic/Lymphatic: Does not bruise/bleed easily.   Skin:  Negative for itching, poor wound healing and rash.   Musculoskeletal:  Positive for joint pain. Negative for back pain, joint swelling, muscle weakness, myalgias and stiffness.   Gastrointestinal:  Negative for abdominal pain, constipation, diarrhea, nausea and vomiting.   Genitourinary: Negative.  Negative for frequency and hematuria.   Neurological:  Negative for dizziness, headaches, numbness, paresthesias and sensory change.   Psychiatric/Behavioral:  Negative for altered mental status and depression. The patient is not nervous/anxious.    Allergic/Immunologic: Negative for hives.                 Objective:     General: Ninfa is well-developed, well-nourished, appears stated age, in no acute distress, alert and oriented to time, place and person.     General    Vitals reviewed.  Constitutional: She is oriented to person, place, and time. She appears well-developed and well-nourished. No distress.   HENT:    Head: Normocephalic and atraumatic.   Eyes: EOM are normal.   Cardiovascular:  Normal rate and regular rhythm.            Pulmonary/Chest: Effort normal. No respiratory distress.   Neurological: She is alert and oriented to person, place, and time. She has normal reflexes. No cranial nerve deficit. Coordination normal.   Psychiatric: She has a normal mood and affect. Her behavior is normal. Judgment and thought content normal.     General Musculoskeletal Exam   Gait: abnormal and antalgic       Right Knee Exam     Inspection   Erythema: absent  Scars: absent  Swelling: absent  Effusion: absent  Deformity: absent  Bruising: absent    Tenderness   The patient is tender to palpation of the medial joint line, lateral joint line and patella.    Range of Motion   Extension:  5 abnormal   Flexion:  120 abnormal     Tests   Meniscus   King:  Medial - positive Lateral - negative  Ligament Examination   Lachman: normal (-1 to 2mm)   PCL-Posterior Drawer: normal (0 to 2mm)     MCL - Valgus: normal (0 to 2mm)  LCL - Varus: normal  Pivot Shift: normal (Equal)  Reverse Pivot Shift: normal (Equal)  Posterolateral Corner: stable  Patella   Passive Patellar Tilt: neutral  Patellar Tracking: normal  Patellar Glide (quadrants): Lateral - 1   Medial - 2  Patellar Grind: negative    Other   Sensation: normal    Left Knee Exam     Inspection   Erythema: absent  Scars: absent  Swelling: absent  Effusion: absent  Deformity: absent  Bruising: absent    Tenderness   The patient tender to palpation of the medial joint line.    Range of Motion   Extension:  0 normal   Flexion:  130 normal     Tests   Meniscus   King:  Medial - negative Lateral - negative  Stability   Lachman: normal (-1 to 2mm)   PCL-Posterior Drawer: normal (0 to 2mm)  MCL - Valgus: normal (0 to 2mm)  LCL - Varus: normal (0 to 2mm)  Pivot Shift: normal (Equal)  Reverse Pivot Shift: normal (Equal)  Posterolateral Corner: stable  Patella   Passive Patellar Tilt:  neutral  Patellar Tracking: normal  Patellar Glide (Quadrants): Lateral - 1 Medial - 2  Patellar Grind: negative    Other   Sensation: normal    Muscle Strength   Right Lower Extremity   Hip Abduction: 5/5   Quadriceps:  5/5   Hamstrin/5   Left Lower Extremity   Hip Abduction: 5/5   Quadriceps:  5/5   Hamstrin/5     Reflexes     Left Side  Achilles:  2+  Quadriceps:  2+    Right Side   Achilles:  2+  Quadriceps:  2+    Vascular Exam     Right Pulses  Dorsalis Pedis:      2+  Posterior Tibial:      2+        Left Pulses  Dorsalis Pedis:      2+  Posterior Tibial:      2+        RADIOGRAPHS: 4/3/23  Bilateral knees:  FINDINGS:  Mild DJD.  No significant joint space narrowing.  No fracture or dislocation.  No bone destruction identified    Assessment:     Encounter Diagnoses   Name Primary?    Primary osteoarthritis of right knee Yes    Primary osteoarthritis of left knee         Plan:     Holding off on visco supplementation injections today due to upcoming trip and no pain in knees.   Recommended she take the medrol dose pack if needed for neck, knee, or hip pain while on vacation.  Explained that she should not take Celebrex while taking medrol dose pack that was prescribed by neurology.   4.  RTC in 4 weeks with Juliette Cheney PA-C for bilateral Synvisc one injections.   Patient questionnaires may have been collected.

## 2024-05-01 ENCOUNTER — OFFICE VISIT (OUTPATIENT)
Dept: NEUROLOGY | Facility: CLINIC | Age: 49
End: 2024-05-01
Payer: COMMERCIAL

## 2024-05-01 VITALS
WEIGHT: 245.81 LBS | HEART RATE: 53 BPM | BODY MASS INDEX: 37.38 KG/M2 | DIASTOLIC BLOOD PRESSURE: 68 MMHG | RESPIRATION RATE: 20 BRPM | SYSTOLIC BLOOD PRESSURE: 131 MMHG

## 2024-05-01 DIAGNOSIS — G43.009 MIGRAINE WITHOUT AURA AND WITHOUT STATUS MIGRAINOSUS, NOT INTRACTABLE: ICD-10-CM

## 2024-05-01 DIAGNOSIS — G44.86 CERVICOGENIC HEADACHE: Primary | ICD-10-CM

## 2024-05-01 DIAGNOSIS — M79.18 MYOFASCIAL PAIN: ICD-10-CM

## 2024-05-01 PROCEDURE — 1160F RVW MEDS BY RX/DR IN RCRD: CPT | Mod: CPTII,S$GLB,, | Performed by: PHYSICIAN ASSISTANT

## 2024-05-01 PROCEDURE — 4010F ACE/ARB THERAPY RXD/TAKEN: CPT | Mod: CPTII,S$GLB,, | Performed by: PHYSICIAN ASSISTANT

## 2024-05-01 PROCEDURE — 99214 OFFICE O/P EST MOD 30 MIN: CPT | Mod: S$GLB,,, | Performed by: PHYSICIAN ASSISTANT

## 2024-05-01 PROCEDURE — 1159F MED LIST DOCD IN RCRD: CPT | Mod: CPTII,S$GLB,, | Performed by: PHYSICIAN ASSISTANT

## 2024-05-01 PROCEDURE — 3075F SYST BP GE 130 - 139MM HG: CPT | Mod: CPTII,S$GLB,, | Performed by: PHYSICIAN ASSISTANT

## 2024-05-01 PROCEDURE — 3078F DIAST BP <80 MM HG: CPT | Mod: CPTII,S$GLB,, | Performed by: PHYSICIAN ASSISTANT

## 2024-05-01 PROCEDURE — 99999 PR PBB SHADOW E&M-EST. PATIENT-LVL V: CPT | Mod: PBBFAC,,, | Performed by: PHYSICIAN ASSISTANT

## 2024-05-01 PROCEDURE — 3008F BODY MASS INDEX DOCD: CPT | Mod: CPTII,S$GLB,, | Performed by: PHYSICIAN ASSISTANT

## 2024-05-01 RX ORDER — ATOGEPANT 60 MG/1
60 TABLET ORAL EVERY OTHER DAY
Qty: 30 TABLET | Refills: 6 | Status: SHIPPED | OUTPATIENT
Start: 2024-05-01

## 2024-05-01 NOTE — PROGRESS NOTES
[facility]  [address] [facility] [address]  [place] [address]  Phone:[phone]  Fax: [phone]  Follow up visit     Patient Name: Ninfa Aguilar  : 1975  MRN:  [id number]  Today: 2024   LOV: 2024  chief complaint: Headache    PCP: Yahaira Almanza MD.       Assessment:   Ninfa Aguilar is a 48 y.o. right handed, female with a PMHx of: neck pain/back pain, essential tremor, kidney stones (?), polycystic ovaries, vitamin D defic, HA,  and depression/anxiety   whom presents solo in follow up for HA.  HA appear to be cervicogenic HA, and at times migrainous.  Hisotrically felt some of discomfort was coming from her neck, she is followed by pain management.       Review:    ICD-10-CM ICD-9-CM   1. Cervicogenic headache  G44.86 784.0   2. Migraine without aura and without status migrainosus, not intractable  G43.009 346.10   3. Myofascial pain  M79.18 729.1          Plan:        For HA Prevention:  1 has parafon forte   2 PT re ordered, gave script to take to place of her choosing   3 mental health meds, AED and BP meds per other providers   4 qulipta 1 pill daily, discussed adv effects/dosing, she agreed     For HA :  Limit OTC to <3 days use in week     To break up Headaches:  Has deltasone (will not take with celebrex)       Other:  Follow up with mental health    Follow up pain management         All test results as well as any necessary instructions were reviewed and discussed with patient.    Review:  Orders Placed This Encounter    Ambulatory referral/consult to Physical/Occupational Therapy    atogepant (QULIPTA) 60 mg Tab           Patient to return to PCP/other specialists for all other problems  Patient to continue on all medications as Rx'd  Full office note available online   RTO- 2-3 months to check in   The patient indicates understanding of these issues and agrees to the plan.    HPI:   Ninfa Liang  "Jeff is a 48 y.o.right handed, female with a PMHx of: neck pain/back pain, essential tremor, kidney stones (?), polycystic ovaries, vitamin D defic, HA,  and depression/anxiety   whom presents solo in follow up for HA.     At last visit:  parafon forte written, PT ordered and a course of deltasone written.   Parafon forte helping overall   Never took steroids given at last visit, saving that for her trip to [place]  Will have some neck discomfort a few times a week lasting 30s, has seen pain management, had test shots for possible ablation, this has her concerned and she wants to discuss other options (has pending appt with pain management this week)   HA pain now reported as 30 days /30 lasting "hours"   Has constant dull pressure sensation in head  +photophobia and phonophobia  States she saw ophthalmologist recently and was told everything was okay   Has HA presently   She is looking to try PT closer to home and would like a new script/referral     From previous visits:   From chart review has been followed by [facility] HPI:  Start:HA in late 90s d/t meningitis, however got better over time (was on topamax in past, may have had a kidney stone... ?) and concurrently multi year hx of neck pain (was followed by pain management, last intervention was 2+ years ago, felt it really helped), however in Sept 2023 started developing HA, had a "thunder clap HA" and eventually went to ER d/t the discomfort, has had 2 more sudden attack of pain. Has been having more neck pain, historically would radiate into shoulders but now radiating more in the top occipitalis/vertex and occasionally into the parietal region.   History of trauma (no), History of CNS infection (yes, meningitis), History of Stroke (no)  Location:base of neck to the occipitals, vertex/parietal region   Severity: moderate to severe   Duration:>24 hours   Frequency:4 days of HA in past month   Neck pain: daily for many years   Associated " factors (bolded positive) WITH HA ( or migraine): Nausea, vomiting, photophobia, phonophobia, tinnitus, scalp pain, vision loss, diplopia, scintillations, eye pain, jaw pain, weakness?    Tried:tizanidine  Triggers (in bold): coitus/orgasm(ER visit, 10/3/2023 notes), stress, lack of sleep, too much caffeine, too little caffeine, weather change, seasonal change, strong odours, bright lights, sunlight, food    Last HA: has one now      Positives in bold: Hx of Kidney Stones, asthma, GI bleed, osteoporosis, CAD/MI, CVA/TIA, DM    Imaging on file: MRI Brain and MRI C spine 2023 (as below)   Therapies tried in past: (failures to be marked, if known---why did it fail?)  Ibuprofen   Celebrex  Topamax  Bupropion  Gabapentin  Metoprolol  Losartan  Trazodone  Tramadol  Zanaflex  Ultram  Flexeril  Parafon forte  Soma  PT  deltasone      Medication Reconciliation:   Current Outpatient Medications   Medication Sig Dispense Refill    ALPRAZolam (XANAX) 1 MG tablet Take 1 tablet (1 mg total) by mouth nightly as needed for Anxiety. 30 tablet 0    aspirin 81 MG Chew       atorvastatin (LIPITOR) 20 MG tablet Take 40 mg by mouth every evening.      buPROPion (WELLBUTRIN XL) 150 MG TB24 tablet Take 1 tablet (150 mg total) by mouth once daily. 90 tablet 3    busPIRone (BUSPAR) 5 MG Tab Take 1 tablet (5 mg total) by mouth 2 (two) times daily. 60 tablet 1    celecoxib (CELEBREX) 200 MG capsule TAKE 1 CAPSULE(200 MG) BY MOUTH TWICE DAILY 60 capsule 2    chlorzoxazone (PARAFON FORTE) 500 mg Tab 1 pill am, 1 pill noon as needed for neck pain and headaches 60 tablet 5    cholecalciferol, vitamin D3, (VITAMIN D3) 25 mcg (1,000 unit) capsule Take 5,000 Units by mouth.      cyclobenzaprine (FLEXERIL) 10 MG tablet Take 10 mg by mouth nightly as needed.      estradioL (ESTRACE) 2 MG tablet Take 1 tablet (2 mg total) by mouth once daily. 30 tablet 11    gabapentin (NEURONTIN) 300 MG capsule       ibuprofen (ADVIL,MOTRIN) 200 MG tablet Take 200 mg  "by mouth every 6 (six) hours as needed for Pain. (Patient not taking: Reported on 4/16/2024)      levothyroxine (SYNTHROID) 100 MCG tablet Synthroid 100 mcg tablet   TAKE 1 TABLET BY MOUTH EVERY DAY      losartan (COZAAR) 50 MG tablet       methocarbamoL (ROBAXIN) 750 MG Tab Take 1 tablet (750 mg total) by mouth daily as needed (muscle tightness). 15 tablet 3    metoprolol succinate (TOPROL-XL) 50 MG 24 hr tablet TK 1 T PO QD      predniSONE (DELTASONE) 20 MG tablet On full stomach (breakfast): 3 tabs for 2 days, 2 tabs for 2 days, 1 tab for 2 days. Finish 12 tablet 0    traMADoL (ULTRAM) 50 mg tablet Take 1 tablet (50 mg total) by mouth every 24 hours as needed for Pain. 12 tablet 0    vitamin E 1000 UNIT capsule Take 1,000 Units by mouth.       No current facility-administered medications for this visit.     Review of patient's allergies indicates:   Allergen Reactions    Latex, natural rubber Rash    Iodinated contrast media     Iodine     Ozempic [semaglutide] Other (See Comments)     SI    Iodine and iodide containing products Rash     Eats shellfish.  Eats shellfish.  Eats shellfish.    Meloxicam Hives     Can take ibuprofen       PMHx:  Past Medical History:   Diagnosis Date    Abnormal Pap smear of cervix     Arthritis     Back pain     Cervical disc syndrome     Depression     Essential tremor     General anesthetics causing adverse effect in therapeutic use     patient reports she was told she "woke up" during tubal ligation    Goiter     MNG    Headache     Hypothyroidism     Lumbar disc disease     Patient is Tenriism     Polycystic ovaries     PONV (postoperative nausea and vomiting)     Sleep apnea     Vitamin D deficiency      Past Surgical History:   Procedure Laterality Date    APPENDECTOMY      BREAST BIOPSY Left     Excisional removal of lymph node, benign    DILATION AND CURETTAGE OF UTERUS      FOOT MASS EXCISION Right 10/30/2018    Procedure: EXCISION, MASS, FOOT probable fibroma;  " Surgeon: Ha Caicedo MD;  Location: [facility] OR [facility] FLR;  Service: Orthopedics;  Laterality: Right;    HYSTERECTOMY  2017    DLH ov in situ     INJECTION OF ANESTHETIC AGENT AROUND MEDIAL BRANCH NERVES INNERVATING CERVICAL FACET JOINT Bilateral 4/3/2024    Procedure: Block-nerve-medial branch-cervical     C3/4, C4/5;  Surgeon: Joshua Esparza MD;  Location: [facility];  Service: Pain Management;  Laterality: Bilateral;    INJECTION OF ANESTHETIC AGENT AROUND MEDIAL BRANCH NERVES INNERVATING CERVICAL FACET JOINT Bilateral 2024    Procedure: Block-nerve-medial branch-cervical   C3/4, C4/5;  Surgeon: Joshua Esparza MD;  Location: [facility] OR;  Service: Pain Management;  Laterality: Bilateral;    OVARIAN CYST SURGERY Right     SHOULDER SURGERY      right    tubal lig  1998       Fhx:  Family History   Problem Relation Name Age of Onset    COPD Father      Peripheral vascular disease Father      Cancer Mother  50        breast    Breast cancer Mother      Asperger's syndrome Daughter      Thyroid disease Daughter          Hashimotos'    Ovarian cancer Neg Hx      Colon cancer Neg Hx         Shx: etoh use, 3 glasses of wine at night   Social History     Socioeconomic History    Marital status:    Tobacco Use    Smoking status: Former     Current packs/day: 0.00     Average packs/day: 0.1 packs/day for 5.0 years (0.5 ttl pk-yrs)     Types: Cigarettes     Start date: 1987     Quit date: 1992     Years since quittin.8    Smokeless tobacco: Former   Substance and Sexual Activity    Alcohol use: Yes     Alcohol/week: 1.0 - 2.0 standard drink of alcohol     Types: 1 - 2 Glasses of wine per week     Comment: daily    Drug use: No    Sexual activity: Yes     Partners: Male     Birth control/protection: None, See Surgical Hx     Comment:  to [place]      Social Determinants of Health     Financial Resource Strain: Low Risk  (2024)    Overall Financial Resource Strain (CARDIA)     Difficulty  of Paying Living Expenses: Not very hard   Food Insecurity: No Food Insecurity (2024)    Hunger Vital Sign     Worried About Running Out of Food in the Last Year: Never true     Ran Out of Food in the Last Year: Never true   Transportation Needs: No Transportation Needs (2024)    PRAPARE - Transportation     Lack of Transportation (Medical): No     Lack of Transportation (Non-Medical): No   Physical Activity: Insufficiently Active (2024)    Exercise Vital Sign     Days of Exercise per Week: 3 days     Minutes of Exercise per Session: 30 min   Stress: Stress Concern Present (2024)    [employer] - Occupational Stress Questionnaire     Feeling of Stress : Rather much   Housing Stability: Low Risk  (2024)    Housing Stability Vital Sign     Unable to Pay for Housing in the Last Year: No     Number of Places Lived in the Last Year: 1     Unstable Housing in the Last Year: No           Labs:   Reviewed in chart     Imagin2023 MRI brain (report): IMPRESSION:     Motion degradation.     Small subcortical foci of white matter hyperintensity within the right frontal lobe, nonspecific.     Incidental sinus disease.    -Noted she brings up concern that her cerebellar tonsils are low lying, I reviewed imaging studies of C spine that go back to 2016 and from my view, it appears similar. I discussed I am not a radiologist but from my view, it does appear she has had this for a long time and doesn't appear to have drastically changed. She voiced agreement.  MICHAEL MELVIN 2024       10/18/2023 MRI C spine (report): HISTORY: Neck pain.     Multiplanar pre and postcontrast imaging are performed before and following intravenous administration of 11.5 mL Gadavist..     Comparison is made to examination of 2021. There is degradation of the examination by patient motion.     The cervical vertebral bodies are appropriately maintained in height. Vertebral body  alignment is satisfactory. There is mild disc space narrowing at C5-6 and C6-7. No pathologic marrow replacement demonstrated.     At C2-3, shallow disc bulging and posterior osteophytic ridging results in mild mass effect upon the ventral margin of the thecal sac towards the left of midline without significant encroachment of the central spinal canal. There is minor left foraminal narrowing.     At C3-4, shallow bulging of the disc margin contributes to mild mass effect upon the ventral margin of the thecal sac without significant central canal stenosis. There is mild to moderate left foraminal narrowing.     At C4-5, there is shallow bulging of the disc margin and small posteriorly directed marginal osteophyte formation contributing to mild mass effect upon the thecal sac diffusely. The central canal is not significantly encroached. There is mild left foraminal narrowing.     At C5-6, broad-based bulging of the disc margin and posterior osteophyte formation results in mild diffuse mass effect upon the thecal sac without direct cord impingement. There is moderately severe right foraminal and moderate left foraminal narrowing.     At C6-7, there is shallow bulging of the disc margin resulting in mild mass effect upon the thecal sac. There are mild degrees of bilateral foraminal narrowing.     At C7-T1, minimal disc bulging contributes to minor mass effect upon the thecal sac. There are facet degenerative changes resulting in moderate left foraminal and mild-moderate right foraminal narrowing.     The visualized segment of the cervical spinal cord appears unremarkable.     No pathologic enhancement is demonstrated following contrast administration.     IMPRESSION:     Degradation by patient motion.     Degenerative disc changes contributing to central canal and foraminal encroachment as detailed.    Other testing:  Reviewed in chart     Note: I have independently reviewed any/all imaging/labs/tests and agree with the  report (s) as documented.  Any discrepancies will be as noted/demarcated by free text.  MICHAEL MELVIN 5/1/2024                     ROS:   Review Of Systems (questions asked, positive or additions in BOLD)  Gen: Weight change-gain, fatigue/malaise, pyrexia   HEENT: Tinnitus, headache,  blurred vision, eye pain, diplopia, photophobia,  nose bleeds, congestion, sore throat, jaw pain, scalp pain, neck stiffness   Card: Palpitations, CP   Pulm: SOB, cough   Vas: Easy bruising, easy bleeding   GI: N/V/D/C, incontinence, hematemesis, hematochezia    : incontinence, hematuria   Endocrine: Temp intolerance, polyuria, polydipsia   M/S: Neck pain, myalgia, back pain, joint pain, falls    Neuro: PER HPI   PSY: Memory loss, confusion, depression, anxiety, trouble in sleep          EXAM:   /68   Pulse (!) 53   Resp 20   Wt 111.5 kg (245 lb 13 oz)   LMP 01/15/2017   BMI 37.38 kg/m²    GEN:  NAD  HEENT: NC/AT   EXTREM:  no edema present.    NEURO:  Mental Status:  Awake, alert and appropriately oriented to time, place, and person.  Normal attention and concentration.  Speech is fluent and appropriate language function (I.e., comprehension), hand wringing behaviour present     Cranial Nerves:     .  Extraocular movements are intact and without nystagmus.  Visual fields are full to confrontation testing.  Facial movement is symmetric.     Hearing is normal. Uvula in midline.  DROM of neck in all (6) directions, shoulder shrug symmetrical. Tongue in midline without fasiculation.     Motor: antigravity in all limbs   No drift  Head tremor present  No resting tremor but did have some R>L UE tremor in winged position          DTR's:                                            R              L                  Knee jerk 2+ 2+             Gait and Stance: Normal manner of stance and gait function testing.          This document has been electronically signed by Mr. Riley France MPA, PA-C on 5/1/2024, I have personally typed  this message using the EMR.       Dr Chapin MD  was available during today's visit.     Personal Protective Equipment:    Personal Protective Equipment was used during this encounter including:  mask-surgical and non latex gloves.          CC: Yahaira Almanza MD

## 2024-05-02 ENCOUNTER — TELEPHONE (OUTPATIENT)
Dept: NEUROLOGY | Facility: CLINIC | Age: 49
End: 2024-05-02
Payer: COMMERCIAL

## 2024-05-02 NOTE — TELEPHONE ENCOUNTER
The prior authorization for Ninfa Aguilar's Qulipta prescription has been APPROVED FROM 5/2/24 TO 5/2/25 with copayment of $0.00.

## 2024-05-03 ENCOUNTER — TELEPHONE (OUTPATIENT)
Dept: PAIN MEDICINE | Facility: CLINIC | Age: 49
End: 2024-05-03
Payer: COMMERCIAL

## 2024-05-06 ENCOUNTER — OFFICE VISIT (OUTPATIENT)
Dept: PAIN MEDICINE | Facility: CLINIC | Age: 49
End: 2024-05-06
Payer: COMMERCIAL

## 2024-05-06 VITALS
HEART RATE: 66 BPM | HEIGHT: 68 IN | SYSTOLIC BLOOD PRESSURE: 129 MMHG | BODY MASS INDEX: 37.14 KG/M2 | DIASTOLIC BLOOD PRESSURE: 62 MMHG | WEIGHT: 245.06 LBS

## 2024-05-06 DIAGNOSIS — M47.812 CERVICAL SPONDYLOSIS: Primary | ICD-10-CM

## 2024-05-06 DIAGNOSIS — G89.29 CHRONIC NECK PAIN: ICD-10-CM

## 2024-05-06 DIAGNOSIS — M54.2 CHRONIC NECK PAIN: ICD-10-CM

## 2024-05-06 PROCEDURE — 1160F RVW MEDS BY RX/DR IN RCRD: CPT | Mod: CPTII,S$GLB,, | Performed by: ANESTHESIOLOGY

## 2024-05-06 PROCEDURE — 99999 PR PBB SHADOW E&M-EST. PATIENT-LVL IV: CPT | Mod: PBBFAC,,, | Performed by: ANESTHESIOLOGY

## 2024-05-06 PROCEDURE — 3078F DIAST BP <80 MM HG: CPT | Mod: CPTII,S$GLB,, | Performed by: ANESTHESIOLOGY

## 2024-05-06 PROCEDURE — 1159F MED LIST DOCD IN RCRD: CPT | Mod: CPTII,S$GLB,, | Performed by: ANESTHESIOLOGY

## 2024-05-06 PROCEDURE — 99213 OFFICE O/P EST LOW 20 MIN: CPT | Mod: S$GLB,,, | Performed by: ANESTHESIOLOGY

## 2024-05-06 PROCEDURE — 3008F BODY MASS INDEX DOCD: CPT | Mod: CPTII,S$GLB,, | Performed by: ANESTHESIOLOGY

## 2024-05-06 PROCEDURE — 4010F ACE/ARB THERAPY RXD/TAKEN: CPT | Mod: CPTII,S$GLB,, | Performed by: ANESTHESIOLOGY

## 2024-05-06 PROCEDURE — 3074F SYST BP LT 130 MM HG: CPT | Mod: CPTII,S$GLB,, | Performed by: ANESTHESIOLOGY

## 2024-05-06 NOTE — PROGRESS NOTES
[facility] Follow Up Evaluation      Referred by: No ref. provider found    PCP:     CC:   Chief Complaint   Patient presents with    Neck Pain    Headache     Dull headaches     Hip Pain     Left side     Mid-back Pain          5/6/2024     2:42 PM 3/7/2024     1:52 PM   Last 3 PDI Scores   Pain Disability Index (PDI) 21 43       Interval HPI 5/6/24: Ms. Aguilar returns to the office for follow up.  She is status post 2nd diagnosticl bilateral C3-4 and C4-5 medial branch block on 4/19/24.  Today she reports she continues to have typical axial neck pain.  No radicular pain.  No new numbness or weakness.      Pain Intervention History:  - s/p 1st diagnostic bilateral C3/4 and C4/5 medial branch blocks on 4/3/24 with 80% relief lasting for 8 hours.  Pre procedure pain: 7   Post procedure pain: 1  - s/p 2nd diagnostic bilateral C3/4 and C4/5 medial branch blocks on 4/19/24 with 90% relief lasting for 6 hours.  Pre procedure pain: 8  Post procedure pain: 1      Past Spine Surgical History:      HPI:   Ninfa Aguilar is a 48 y.o. female patient who has a past medical history of Abnormal Pap smear of cervix, Arthritis, Back pain, Cervical disc syndrome, Depression, Essential tremor, General anesthetics causing adverse effect in therapeutic use, Goiter, Headache, Hypothyroidism, Lumbar disc disease, Patient is Holiness, Polycystic ovaries, PONV (postoperative nausea and vomiting), Sleep apnea, and Vitamin D deficiency. She presents with neck pain.  She has had chronic neck pain for the past 10 years.  Today she reports axial neck pain, 5/10, aching, deep.  She can get referred pain up the back of her occiput and into her shoulders.  Her pain isn't worse with anything in particular and is present constantly and can be relieved with PT, heat, stretching.      Past and current medications:  Antineuropathics: gabapentin   NSAIDs: ibuprofen   Physical therapy: yes, completed    Antidepressants:  Muscle relaxers: robaxin, flexeril   Opioids: tramadol   Antiplatelets/Anticoagulants: spirin     History:    Current Outpatient Medications:     aspirin 81 MG Chew, , Disp: , Rfl:     atorvastatin (LIPITOR) 20 MG tablet, Take 40 mg by mouth every evening., Disp: , Rfl:     buPROPion (WELLBUTRIN XL) 150 MG TB24 tablet, Take 1 tablet (150 mg total) by mouth once daily., Disp: 90 tablet, Rfl: 3    busPIRone (BUSPAR) 5 MG Tab, Take 1 tablet (5 mg total) by mouth 2 (two) times daily., Disp: 60 tablet, Rfl: 1    celecoxib (CELEBREX) 200 MG capsule, TAKE 1 CAPSULE(200 MG) BY MOUTH TWICE DAILY, Disp: 60 capsule, Rfl: 2    chlorzoxazone (PARAFON FORTE) 500 mg Tab, 1 pill am, 1 pill noon as needed for neck pain and headaches, Disp: 60 tablet, Rfl: 5    cholecalciferol, vitamin D3, (VITAMIN D3) 25 mcg (1,000 unit) capsule, Take 5,000 Units by mouth., Disp: , Rfl:     gabapentin (NEURONTIN) 300 MG capsule, , Disp: , Rfl:     levothyroxine (SYNTHROID) 100 MCG tablet, Synthroid 100 mcg tablet  TAKE 1 TABLET BY MOUTH EVERY DAY, Disp: , Rfl:     losartan (COZAAR) 50 MG tablet, , Disp: , Rfl:     ALPRAZolam (XANAX) 1 MG tablet, Take 1 tablet (1 mg total) by mouth nightly as needed for Anxiety. (Patient not taking: Reported on 5/6/2024), Disp: 30 tablet, Rfl: 0    atogepant (QULIPTA) 60 mg Tab, Take 60 mg by mouth every other day. (Patient not taking: Reported on 5/6/2024), Disp: 30 tablet, Rfl: 6    cyclobenzaprine (FLEXERIL) 10 MG tablet, Take 10 mg by mouth nightly as needed. (Patient not taking: Reported on 5/6/2024), Disp: , Rfl:     estradioL (ESTRACE) 2 MG tablet, Take 1 tablet (2 mg total) by mouth once daily., Disp: 30 tablet, Rfl: 11    ibuprofen (ADVIL,MOTRIN) 200 MG tablet, Take 200 mg by mouth every 6 (six) hours as needed for Pain. (Patient not taking: Reported on 5/6/2024), Disp: , Rfl:     methocarbamoL (ROBAXIN) 750 MG Tab, Take 1 tablet (750 mg total) by mouth daily as needed (muscle tightness).  "(Patient not taking: Reported on 5/6/2024), Disp: 15 tablet, Rfl: 3    metoprolol succinate (TOPROL-XL) 50 MG 24 hr tablet, TK 1 T PO QD (Patient not taking: Reported on 5/6/2024), Disp: , Rfl:     predniSONE (DELTASONE) 20 MG tablet, On full stomach (breakfast): 3 tabs for 2 days, 2 tabs for 2 days, 1 tab for 2 days. Finish (Patient not taking: Reported on 5/6/2024), Disp: 12 tablet, Rfl: 0    traMADoL (ULTRAM) 50 mg tablet, Take 1 tablet (50 mg total) by mouth every 24 hours as needed for Pain. (Patient not taking: Reported on 5/6/2024), Disp: 12 tablet, Rfl: 0    vitamin E 1000 UNIT capsule, Take 1,000 Units by mouth. (Patient not taking: Reported on 5/6/2024), Disp: , Rfl:     Past Medical History:   Diagnosis Date    Abnormal Pap smear of cervix     Arthritis     Back pain     Cervical disc syndrome     Depression     Essential tremor     General anesthetics causing adverse effect in therapeutic use     patient reports she was told she "woke up" during tubal ligation    Goiter     MNG    Headache     Hypothyroidism     Lumbar disc disease     Patient is Anabaptist     Polycystic ovaries     PONV (postoperative nausea and vomiting)     Sleep apnea     Vitamin D deficiency        Past Surgical History:   Procedure Laterality Date    APPENDECTOMY      BREAST BIOPSY Left     Excisional removal of lymph node, benign    DILATION AND CURETTAGE OF UTERUS      FOOT MASS EXCISION Right 10/30/2018    Procedure: EXCISION, MASS, FOOT probable fibroma;  Surgeon: Ha Caicedo MD;  Location: [address];  Service: Orthopedics;  Laterality: Right;    HYSTERECTOMY  01/30/2017    [facility] ov in situ     INJECTION OF ANESTHETIC AGENT AROUND MEDIAL BRANCH NERVES INNERVATING CERVICAL FACET JOINT Bilateral 4/3/2024    Procedure: Block-nerve-medial branch-cervical     C3/4, C4/5;  Surgeon: Joshua Esparza MD;  Location: [facility];  Service: Pain Management;  Laterality: Bilateral;    INJECTION OF ANESTHETIC AGENT AROUND " MEDIAL BRANCH NERVES INNERVATING CERVICAL FACET JOINT Bilateral 2024    Procedure: Block-nerve-medial branch-cervical   C3/4, C4/5;  Surgeon: Joshua Esparza MD;  Location: [facility] OR;  Service: Pain Management;  Laterality: Bilateral;    OVARIAN CYST SURGERY Right     SHOULDER SURGERY      right    tubal lig  1998       Family History   Problem Relation Name Age of Onset    COPD Father      Peripheral vascular disease Father      Cancer Mother  50        breast    Breast cancer Mother      Asperger's syndrome Daughter      Thyroid disease Daughter          Hashimotos'    Ovarian cancer Neg Hx      Colon cancer Neg Hx         Social History     Socioeconomic History    Marital status:    Tobacco Use    Smoking status: Former     Current packs/day: 0.00     Average packs/day: 0.1 packs/day for 5.0 years (0.5 ttl pk-yrs)     Types: Cigarettes     Start date: 1987     Quit date: 1992     Years since quittin.8    Smokeless tobacco: Former   Substance and Sexual Activity    Alcohol use: Yes     Alcohol/week: 1.0 - 2.0 standard drink of alcohol     Types: 1 - 2 Glasses of wine per week     Comment: daily    Drug use: No    Sexual activity: Yes     Partners: Male     Birth control/protection: None, See Surgical Hx     Comment:  to [place]      Social Determinants of Health     Financial Resource Strain: Low Risk  (2024)    Overall Financial Resource Strain (CARDIA)     Difficulty of Paying Living Expenses: Not very hard   Food Insecurity: No Food Insecurity (2024)    Hunger Vital Sign     Worried About Running Out of Food in the Last Year: Never true     Ran Out of Food in the Last Year: Never true   Transportation Needs: No Transportation Needs (2024)    PRAPARE - Transportation     Lack of Transportation (Medical): No     Lack of Transportation (Non-Medical): No   Physical Activity: Insufficiently Active (2024)    Exercise Vital Sign     Days of Exercise per Week: 3 days      "Minutes of Exercise per Session: 30 min   Stress: Stress Concern Present (1/24/2024)    [employer] [place] [employer] - Occupational Stress Questionnaire     Feeling of Stress : Rather much   Housing Stability: Low Risk  (1/24/2024)    Housing Stability Vital Sign     Unable to Pay for Housing in the Last Year: No     Number of Places Lived in the Last Year: 1     Unstable Housing in the Last Year: No       Review of patient's allergies indicates:   Allergen Reactions    Latex, natural rubber Rash    Iodinated contrast media     Iodine     Ozempic [semaglutide] Other (See Comments)     SI    Iodine and iodide containing products Rash     Eats shellfish.  Eats shellfish.  Eats shellfish.    Meloxicam Hives     Can take ibuprofen       Review of Systems:  12 point review of systems is negative.    Physical Exam:  Vitals:    05/06/24 1441   BP: 129/62   Pulse: 66   Weight: 111.1 kg (245 lb 0.7 oz)   Height: 5' 8" (1.727 m)   PainSc:   3   PainLoc: Neck     Body mass index is 37.26 kg/m².    Gen: NAD  Psych: mood appropriate for given condition  HEENT: eyes anicteric   CV: RRR  HEENT: anicteric   Respiratory: non-labored, no signs of respiratory distress  Abd: non-distended  Skin: warm, dry and intact.  Gait: No antalgic gait.     Reproducible pain with cervical axial facet loading    Sensory:  Intact and symmetrical to light touch in C4-T1 dermatomes bilaterally.    Motor:    Right Left   C4 Shoulder Abduction  5  5   C5 Elbow Flexion    5  5   C6 Wrist Extension  5  5   C7 Elbow Extension   5  5   C8/T1 Hand Intrinsics   5  5      Right Left   Triceps DTR 2+ 2+   Biceps DTR 2+ 2+        Patellar DTR 2+ 2+   Achilles DTR 2+ 2+   Huffman Absent  Absent                 Labs:  Lab Results   Component Value Date    HGBA1C 5.5 11/10/2023       Lab Results   Component Value Date    WBC 7.07 10/03/2023    HGB 13.6 10/03/2023    HCT 40.7 10/03/2023    MCV 94 10/03/2023     10/03/2023         Imaging:  MRI " cervical spine 11/29/23  Comparison is made to examination of 12/21/2021. There is degradation of the examination by patient motion.     The cervical vertebral bodies are appropriately maintained in height. Vertebral body alignment is satisfactory. There is mild disc space narrowing at C5-6 and C6-7. No pathologic marrow replacement demonstrated.     At C2-3, shallow disc bulging and posterior osteophytic ridging results in mild mass effect upon the ventral margin of the thecal sac towards the left of midline without significant encroachment of the central spinal canal. There is minor left foraminal narrowing.  At C3-4, shallow bulging of the disc margin contributes to mild mass effect upon the ventral margin of the thecal sac without significant central canal stenosis. There is mild to moderate left foraminal narrowing.  At C4-5, there is shallow bulging of the disc margin and small posteriorly directed marginal osteophyte formation contributing to mild mass effect upon the thecal sac diffusely. The central canal is not significantly encroached. There is mild left foraminal narrowing.  At C5-6, broad-based bulging of the disc margin and posterior osteophyte formation results in mild diffuse mass effect upon the thecal sac without direct cord impingement. There is moderately severe right foraminal and moderate left foraminal narrowing.  At C6-7, there is shallow bulging of the disc margin resulting in mild mass effect upon the thecal sac. There are mild degrees of bilateral foraminal narrowing.  At C7-T1, minimal disc bulging contributes to minor mass effect upon the thecal sac. There are facet degenerative changes resulting in moderate left foraminal and mild-moderate right foraminal narrowing.     The visualized segment of the cervical spinal cord appears unremarkable.     No pathologic enhancement is demonstrated following contrast administration.    Assessment:   Problem List Items Addressed This Visit     None  Visit Diagnoses       Cervical spondylosis    -  Primary    Chronic neck pain                Ninfa Aguilar is a 48 y.o. female patient who has a past medical history of Abnormal Pap smear of cervix, Arthritis, Back pain, Cervical disc syndrome, Depression, Essential tremor, General anesthetics causing adverse effect in therapeutic use, Goiter, Headache, Hypothyroidism, Lumbar disc disease, Patient is Zoroastrian, Polycystic ovaries, PONV (postoperative nausea and vomiting), Sleep apnea, and Vitamin D deficiency. She presents with neck pain.  She has had chronic neck pain for the past 10 years.  Today she reports axial neck pain, 5/10, aching, deep.  She can get referred pain up the back of her occiput and into her shoulders.  Her pain isn't worse with anything in particular and is present constantly and can be relieved with PT, heat, stretching.    5/6/24 - Ms. Aguilar returns to the office for follow up.  She is status post 2nd diagnosticl bilateral C3-4 and C4-5 medial branch block on 4/19/24.  Today she reports she continues to have typical axial neck pain.  No radicular pain.  No new numbness or weakness.    - s/p 1st diagnostic bilateral C3/4 and C4/5 medial branch blocks on 4/3/24 with 80% relief lasting for 8 hours.  Pre procedure pain: 7   Post procedure pain: 1  - s/p 2nd diagnostic bilateral C3/4 and C4/5 medial branch blocks on 4/19/24 with 90% relief lasting for 6 hours.  Pre procedure pain: 8  Post procedure pain: 1  - we will plan to proceed with bilateral C3-4 and C4-5 RFA.  She is going on a few vacations in the summertime so she is going to reach out to us when she has time to proceed with the procedures.  In the meantime she will continue to use NSAIDs and muscle relaxants on an as needed basis for her spondylosis and myofascial pain.      : Not applicable    Joshua Esparza M.D.  Interventional Pain Medicine / Anesthesiology    This note was completed with dictation  software and grammatical errors may exist.

## 2024-06-05 ENCOUNTER — OFFICE VISIT (OUTPATIENT)
Dept: SPORTS MEDICINE | Facility: CLINIC | Age: 49
End: 2024-06-05
Payer: COMMERCIAL

## 2024-06-05 VITALS
DIASTOLIC BLOOD PRESSURE: 71 MMHG | WEIGHT: 236.13 LBS | BODY MASS INDEX: 35.79 KG/M2 | HEIGHT: 68 IN | HEART RATE: 52 BPM | SYSTOLIC BLOOD PRESSURE: 125 MMHG

## 2024-06-05 DIAGNOSIS — M17.11 PRIMARY OSTEOARTHRITIS OF RIGHT KNEE: Primary | ICD-10-CM

## 2024-06-05 DIAGNOSIS — M17.12 PRIMARY OSTEOARTHRITIS OF LEFT KNEE: ICD-10-CM

## 2024-06-05 PROCEDURE — 99999 PR PBB SHADOW E&M-EST. PATIENT-LVL V: CPT | Mod: PBBFAC,,, | Performed by: PHYSICIAN ASSISTANT

## 2024-06-05 PROCEDURE — 20610 DRAIN/INJ JOINT/BURSA W/O US: CPT | Mod: 50,S$GLB,, | Performed by: PHYSICIAN ASSISTANT

## 2024-06-05 PROCEDURE — 99499 UNLISTED E&M SERVICE: CPT | Mod: S$GLB,,, | Performed by: PHYSICIAN ASSISTANT

## 2024-06-06 DIAGNOSIS — N95.1 MENOPAUSAL SYMPTOMS: ICD-10-CM

## 2024-06-06 RX ORDER — ESTRADIOL 2 MG/1
2 TABLET ORAL DAILY
Qty: 30 TABLET | Refills: 4 | Status: SHIPPED | OUTPATIENT
Start: 2024-06-06 | End: 2024-11-03

## 2024-06-06 NOTE — TELEPHONE ENCOUNTER
Merlin 8/29/2024 scheduled, please send ERX. FAx request received from Pharmacy on file, for refill

## 2024-06-07 ENCOUNTER — OFFICE VISIT (OUTPATIENT)
Dept: URGENT CARE | Facility: CLINIC | Age: 49
End: 2024-06-07
Payer: COMMERCIAL

## 2024-06-07 VITALS
OXYGEN SATURATION: 98 % | DIASTOLIC BLOOD PRESSURE: 84 MMHG | TEMPERATURE: 98 F | WEIGHT: 265 LBS | HEIGHT: 68 IN | SYSTOLIC BLOOD PRESSURE: 122 MMHG | BODY MASS INDEX: 40.16 KG/M2 | HEART RATE: 68 BPM

## 2024-06-07 DIAGNOSIS — J32.9 SINUSITIS, UNSPECIFIED CHRONICITY, UNSPECIFIED LOCATION: Primary | ICD-10-CM

## 2024-06-07 DIAGNOSIS — J06.9 UPPER RESPIRATORY TRACT INFECTION, UNSPECIFIED TYPE: ICD-10-CM

## 2024-06-07 PROCEDURE — 99213 OFFICE O/P EST LOW 20 MIN: CPT | Mod: S$GLB,,, | Performed by: PHYSICIAN ASSISTANT

## 2024-06-07 RX ORDER — AMOXICILLIN 500 MG/1
500 TABLET, FILM COATED ORAL EVERY 12 HOURS
Qty: 20 TABLET | Refills: 0 | Status: SHIPPED | OUTPATIENT
Start: 2024-06-07 | End: 2024-06-17

## 2024-06-07 RX ORDER — PROMETHAZINE HYDROCHLORIDE 6.25 MG/5ML
SYRUP ORAL
Qty: 120 ML | Refills: 1 | Status: SHIPPED | OUTPATIENT
Start: 2024-06-07

## 2024-06-07 RX ORDER — PREDNISONE 20 MG/1
20 TABLET ORAL DAILY
Qty: 5 TABLET | Refills: 0 | Status: SHIPPED | OUTPATIENT
Start: 2024-06-07 | End: 2024-06-12

## 2024-06-07 RX ORDER — AZELASTINE 1 MG/ML
1 SPRAY, METERED NASAL 2 TIMES DAILY
Qty: 30 ML | Refills: 0 | Status: SHIPPED | OUTPATIENT
Start: 2024-06-07 | End: 2025-06-07

## 2024-06-07 NOTE — PROGRESS NOTES
"Subjective:      Patient ID: Ninfa Aguilar is a 48 y.o. female.    Vitals:  height is 5' 8" (1.727 m) and weight is 120.2 kg (265 lb). Her oral temperature is 97.5 °F (36.4 °C). Her blood pressure is 122/84 and her pulse is 68. Her oxygen saturation is 98%.     Chief Complaint: Cough    Pt presents  productive cough, pt states sx started 3 days ago  she advises when he lays down the cough worsens, denies any pain, fever at home, hasn't been exposed to covid nor flu    Cough  This is a new problem. The current episode started in the past 7 days. The problem has been unchanged. The problem occurs constantly. Associated symptoms include a fever, nasal congestion and postnasal drip. Pertinent negatives include no chest pain, chills, ear pain, eye redness, headaches, heartburn, hemoptysis, myalgias, rash, sore throat, shortness of breath or wheezing. Nothing aggravates the symptoms. She has tried nothing for the symptoms. The treatment provided no relief.     Constitution: Positive for fever. Negative for chills, sweating and fatigue.   HENT:  Positive for congestion, postnasal drip, sinus pain and sinus pressure. Negative for ear pain, drooling, sore throat, trouble swallowing and voice change.    Neck: Negative for neck pain, neck stiffness, painful lymph nodes and neck swelling.   Cardiovascular:  Negative for chest pain, leg swelling, palpitations, sob on exertion and passing out.   Eyes:  Negative for eye pain, eye redness, photophobia, double vision, blurred vision and eyelid swelling.   Respiratory:  Positive for cough. Negative for chest tightness, sputum production, bloody sputum, shortness of breath, stridor and wheezing.    Gastrointestinal:  Negative for abdominal pain, abdominal bloating, nausea, vomiting, constipation, diarrhea and heartburn.   Musculoskeletal:  Negative for joint pain, joint swelling, abnormal ROM of joint, back pain, muscle cramps and muscle ache.   Skin:  Negative for rash and " hives.   Allergic/Immunologic: Negative for seasonal allergies, food allergies, hives, itching and sneezing.   Neurological:  Negative for dizziness, light-headedness, passing out, loss of balance, headaches, altered mental status, loss of consciousness and seizures.   Hematologic/Lymphatic: Negative for swollen lymph nodes.   Psychiatric/Behavioral:  Negative for altered mental status and nervous/anxious. The patient is not nervous/anxious.       Objective:     Physical Exam   Constitutional: She is oriented to person, place, and time. She appears well-developed. She is cooperative.  Non-toxic appearance. She does not appear ill. No distress.   HENT:   Head: Normocephalic and atraumatic.   Ears:   Right Ear: Hearing, tympanic membrane, external ear and ear canal normal.   Left Ear: Hearing, tympanic membrane, external ear and ear canal normal.   Nose: Mucosal edema and rhinorrhea present. No nasal deformity. No epistaxis. Right sinus exhibits no maxillary sinus tenderness and no frontal sinus tenderness. Left sinus exhibits no maxillary sinus tenderness and no frontal sinus tenderness.   Mouth/Throat: Uvula is midline and mucous membranes are normal. No trismus in the jaw. Normal dentition. No uvula swelling. Posterior oropharyngeal erythema and cobblestoning present. No oropharyngeal exudate, posterior oropharyngeal edema or tonsillar abscesses. No tonsillar exudate.   Eyes: Conjunctivae and lids are normal. No scleral icterus.   Neck: Trachea normal and phonation normal. Neck supple. No edema present. No erythema present. No neck rigidity present.   Cardiovascular: Normal rate, regular rhythm, normal heart sounds and normal pulses.   Pulmonary/Chest: Effort normal and breath sounds normal. No accessory muscle usage or stridor. No respiratory distress. She has no decreased breath sounds. She has no wheezes. She has no rhonchi. She has no rales.   Abdominal: Normal appearance.   Musculoskeletal: Normal range of  motion.         General: No deformity or edema. Normal range of motion.   Lymphadenopathy:     She has no cervical adenopathy.   Neurological: She is alert and oriented to person, place, and time. She exhibits normal muscle tone. Coordination normal.   Skin: Skin is warm, dry, intact, not diaphoretic, not pale and no rash. Capillary refill takes less than 2 seconds.   Psychiatric: Her speech is normal and behavior is normal. Judgment and thought content normal.   Nursing note and vitals reviewed.      Assessment:     1. Sinusitis, unspecified chronicity, unspecified location    2. Upper respiratory tract infection, unspecified type        Plan:     Patient has had symptoms for > 10 days, so will go ahead with antibiotics RX at this time. Advised close follow-up with PCP and/or Specialist for further evaluation as needed. ER precautions given to patient as well. Patient aware, verbalized understanding and agreed with plan of care.      Sinusitis, unspecified chronicity, unspecified location    Upper respiratory tract infection, unspecified type    Other orders  -     predniSONE (DELTASONE) 20 MG tablet; Take 1 tablet (20 mg total) by mouth once daily. for 5 days  Dispense: 5 tablet; Refill: 0  -     azelastine (ASTELIN) 137 mcg (0.1 %) nasal spray; 1 spray (137 mcg total) by Nasal route 2 (two) times daily.  Dispense: 30 mL; Refill: 0  -     promethazine (PHENERGAN) 6.25 mg/5 mL syrup; Take 10 mLs at night as needed.  Dispense: 120 mL; Refill: 1  -     amoxicillin (AMOXIL) 500 MG Tab; Take 1 tablet (500 mg total) by mouth every 12 (twelve) hours. for 10 days  Dispense: 20 tablet; Refill: 0      Patient Instructions   INSTRUCTIONS:  - Rest.  - Drink plenty of fluids.  - Take Tylenol and/or Ibuprofen as directed as needed for fever/pain.  Do not take more than the recommended dose.  - follow up with your PCP within the next 1-2 weeks as needed.  - You must understand that you have received an Urgent Care treatment only  and that you may be released before all of your medical problems are known or treated.   - You, the patient, will arrange for follow up care as instructed.   - If your condition worsens or fails to improve we recommend that you receive another evaluation at the ER immediately or contact your PCP to discuss your concerns.   - You can call [phone] or [phone] to help schedule an appointment with the appropriate provider.     -If you smoke cigarettes, it would be beneficial for you to stop.

## 2024-06-07 NOTE — PATIENT INSTRUCTIONS

## 2024-07-03 ENCOUNTER — TELEPHONE (OUTPATIENT)
Dept: RADIOLOGY | Facility: HOSPITAL | Age: 49
End: 2024-07-03
Payer: COMMERCIAL

## 2024-07-03 NOTE — TELEPHONE ENCOUNTER
----- Message from Miranda Barrientos sent at 7/2/2024  3:59 PM CDT -----  Name of Who is Calling:VIRGINIA LOZANO [[id number]]        What is the request in detail:Pt would like a callback [facility] for mammo and US pt has orders in epic.Please advise thank you       Can the clinic reply by [facility]:NO        What Number to Call Back if not in [facility]:.Telephone Information:  Mobile          [phone]

## 2024-07-05 ENCOUNTER — TELEPHONE (OUTPATIENT)
Dept: RADIOLOGY | Facility: HOSPITAL | Age: 49
End: 2024-07-05
Payer: COMMERCIAL

## 2024-07-25 ENCOUNTER — HOSPITAL ENCOUNTER (OUTPATIENT)
Dept: RADIOLOGY | Facility: HOSPITAL | Age: 49
Discharge: HOME | End: 2024-07-25
Attending: OBSTETRICS & GYNECOLOGY
Payer: COMMERCIAL

## 2024-07-25 DIAGNOSIS — N63.23 LUMP IN LOWER OUTER QUADRANT OF LEFT BREAST: ICD-10-CM

## 2024-07-25 PROCEDURE — 76642 ULTRASOUND BREAST LIMITED: CPT | Mod: 26,LT,, | Performed by: RADIOLOGY

## 2024-07-25 PROCEDURE — 77066 DX MAMMO INCL CAD BI: CPT | Mod: 26,,, | Performed by: RADIOLOGY

## 2024-07-25 PROCEDURE — 77062 BREAST TOMOSYNTHESIS BI: CPT | Mod: 26,,, | Performed by: RADIOLOGY

## 2024-07-25 PROCEDURE — 77062 BREAST TOMOSYNTHESIS BI: CPT | Mod: TC

## 2024-07-25 PROCEDURE — 76642 ULTRASOUND BREAST LIMITED: CPT | Mod: TC,LT

## 2024-07-25 PROCEDURE — 77066 DX MAMMO INCL CAD BI: CPT | Mod: TC

## 2024-08-11 DIAGNOSIS — M25.561 ACUTE PAIN OF RIGHT KNEE: ICD-10-CM

## 2024-08-11 DIAGNOSIS — M25.461 EFFUSION OF RIGHT KNEE: ICD-10-CM

## 2024-08-12 RX ORDER — CELECOXIB 200 MG/1
200 CAPSULE ORAL 2 TIMES DAILY
Qty: 60 CAPSULE | Refills: 2 | Status: SHIPPED | OUTPATIENT
Start: 2024-08-12

## 2024-08-29 ENCOUNTER — OFFICE VISIT (OUTPATIENT)
Dept: OBSTETRICS AND GYNECOLOGY | Facility: CLINIC | Age: 49
End: 2024-08-29
Payer: COMMERCIAL

## 2024-08-29 VITALS
SYSTOLIC BLOOD PRESSURE: 121 MMHG | BODY MASS INDEX: 37.52 KG/M2 | DIASTOLIC BLOOD PRESSURE: 78 MMHG | HEIGHT: 68 IN | WEIGHT: 247.56 LBS

## 2024-08-29 DIAGNOSIS — R30.0 DYSURIA: ICD-10-CM

## 2024-08-29 DIAGNOSIS — N95.1 MENOPAUSAL SYMPTOMS: ICD-10-CM

## 2024-08-29 DIAGNOSIS — Z12.39 ENCOUNTER FOR SCREENING FOR MALIGNANT NEOPLASM OF BREAST, UNSPECIFIED SCREENING MODALITY: Primary | ICD-10-CM

## 2024-08-29 PROCEDURE — 99999 PR PBB SHADOW E&M-EST. PATIENT-LVL III: CPT | Mod: PBBFAC,,, | Performed by: OBSTETRICS & GYNECOLOGY

## 2024-08-29 PROCEDURE — 87086 URINE CULTURE/COLONY COUNT: CPT | Performed by: OBSTETRICS & GYNECOLOGY

## 2024-08-29 RX ORDER — ESTRADIOL 2 MG/1
2 TABLET ORAL DAILY
Qty: 30 TABLET | Refills: 4 | Status: SHIPPED | OUTPATIENT
Start: 2024-08-29 | End: 2024-08-29

## 2024-08-29 RX ORDER — ESTRADIOL 2 MG/1
2 TABLET ORAL DAILY
Qty: 30 TABLET | Refills: 11 | Status: SHIPPED | OUTPATIENT
Start: 2024-08-29 | End: 2025-08-24

## 2024-08-29 NOTE — PROGRESS NOTES
"Well woman exam    Ninfa Aguilar is a 48 y.o. female  presents for well woman exam.  LMP: Patient's last menstrual period was 01/15/2017.  Patient is status post robotic assisted hysterectomy with bilateral salpingectomy.  Patient reports no menopausal symptoms on current ERT.  Patient reports painful urination over past few days.  Patient also reports increased urgency.  No gynecologic issues, problems, or complaints.      Chart reviewed    Past Medical History:   Diagnosis Date    Abnormal Pap smear of cervix     Arthritis     Back pain     Cervical disc syndrome     Depression     Essential tremor     General anesthetics causing adverse effect in therapeutic use     patient reports she was told she "woke up" during tubal ligation    Goiter     MNG    Headache     Hypothyroidism     Lumbar disc disease     Patient is [employer]     Polycystic ovaries     PONV (postoperative nausea and vomiting)     Sleep apnea     Vitamin D deficiency      Past Surgical History:   Procedure Laterality Date    APPENDECTOMY      BREAST BIOPSY Left     Excisional removal of lymph node, benign    DILATION AND CURETTAGE OF UTERUS      FOOT MASS EXCISION Right 10/30/2018    Procedure: EXCISION, MASS, FOOT probable fibroma;  Surgeon: Ha Caicedo MD;  Location: [address];  Service: Orthopedics;  Laterality: Right;    HYSTERECTOMY  2017    DLH ov in situ     INJECTION OF ANESTHETIC AGENT AROUND MEDIAL BRANCH NERVES INNERVATING CERVICAL FACET JOINT Bilateral 4/3/2024    Procedure: Block-nerve-medial branch-cervical     C3/4, C4/5;  Surgeon: Joshua Esparza MD;  Location: [facility] OR;  Service: Pain Management;  Laterality: Bilateral;    INJECTION OF ANESTHETIC AGENT AROUND MEDIAL BRANCH NERVES INNERVATING CERVICAL FACET JOINT Bilateral 2024    Procedure: Block-nerve-medial branch-cervical   C3/4, C4/5;  Surgeon: Joshua Esparza MD;  Location: [facility] OR;  Service: Pain Management;  Laterality: " Bilateral;    OVARIAN CYST SURGERY Right     SHOULDER SURGERY      right    tubal lig  1998     Social History     Socioeconomic History    Marital status:    Tobacco Use    Smoking status: Former     Current packs/day: 0.00     Average packs/day: 0.1 packs/day for 5.0 years (0.5 ttl pk-yrs)     Types: Cigarettes     Start date: 1987     Quit date: 1992     Years since quittin.1    Smokeless tobacco: Former   Substance and Sexual Activity    Alcohol use: Yes     Alcohol/week: 1.0 - 2.0 standard drink of alcohol     Types: 1 - 2 Glasses of wine per week     Comment: daily    Drug use: No    Sexual activity: Yes     Partners: Male     Birth control/protection: None, See Surgical Hx     Comment:  to Mane      Social Determinants of Health     Financial Resource Strain: Low Risk  (2024)    Overall Financial Resource Strain (CARDIA)     Difficulty of Paying Living Expenses: Not very hard   Food Insecurity: No Food Insecurity (2024)    Hunger Vital Sign     Worried About Running Out of Food in the Last Year: Never true     Ran Out of Food in the Last Year: Never true   Transportation Needs: No Transportation Needs (2024)    PRAPARE - Transportation     Lack of Transportation (Medical): No     Lack of Transportation (Non-Medical): No   Physical Activity: Insufficiently Active (2024)    Exercise Vital Sign     Days of Exercise per Week: 3 days     Minutes of Exercise per Session: 30 min   Stress: Stress Concern Present (2024)    [employer] - Occupational Stress Questionnaire     Feeling of Stress : Rather much   Housing Stability: Low Risk  (2024)    Housing Stability Vital Sign     Unable to Pay for Housing in the Last Year: No     Number of Places Lived in the Last Year: 1     Unstable Housing in the Last Year: No     Family History   Problem Relation Name Age of Onset    COPD Father      Peripheral vascular disease Father      Cancer  "Mother  50        breast    Breast cancer Mother      Asperger's syndrome Daughter      Thyroid disease Daughter          Hashimotos'    Ovarian cancer Neg Hx      Colon cancer Neg Hx       OB History          2    Para   2    Term   2            AB        Living   2         SAB        IAB        Ectopic        Multiple        Live Births                     /78   Ht 5' 7.99" (1.727 m)   Wt 112.3 kg (247 lb 9.2 oz)   LMP 01/15/2017   BMI 37.65 kg/m²       ROS:  GENERAL: Denies weight gain or weight loss. Feeling well overall.   SKIN: Denies rash or lesions.   HEAD: Denies head injury or headache.   NODES: Denies enlarged lymph nodes.   CHEST: Denies chest pain or shortness of breath.   CARDIOVASCULAR: Denies palpitations or left sided chest pain.   ABDOMEN: No abdominal pain, constipation, diarrhea, nausea, vomiting or rectal bleeding.   URINARY: No frequency, dysuria, hematuria, or burning on urination.  REPRODUCTIVE: See HPI.   BREASTS: The patient performs breast self-examination and denies pain, lumps, or nipple discharge.   HEMATOLOGIC: No easy bruisability or excessive bleeding.   MUSCULOSKELETAL: Denies joint pain or swelling.   NEUROLOGIC: Denies syncope or weakness.   PSYCHIATRIC: Denies depression, anxiety or mood swings.    PHYSICAL EXAM:  APPEARANCE: Well nourished, well developed, in no acute distress.  AFFECT: WNL, alert and oriented x 3  SKIN: No acne or hirsutism  NECK: Neck symmetric without masses or thyromegaly  NODES: No inguinal, cervical, axillary, or femoral lymph node enlargement  CHEST: Good respiratory effect  ABDOMEN: Soft.  No tenderness or masses.  No hepatosplenomegaly.  No hernias.  BREASTS: Symmetrical, no skin changes or visible lesions.  No palpable masses, nipple discharge bilaterally.  PELVIC: Normal external genitalia without lesions.  Normal hair distribution.  Adequate perineal body, normal urethral meatus.  Vagina with rugae and without lesions or " discharge.  Vaginal cuff with good support Cervix/uterus absent..  No significant cystocele or rectocele.  Bimanual exam reveals no adnexal mass or tenderness bilaterally.  EXTREMITIES: No edema.    Encounter for screening for malignant neoplasm of breast, unspecified screening modality  -     Mammo Digital Screening Bilat w/ [facility]; Future; Expected date: 08/29/2025    Menopausal symptoms  -     Discontinue: estradioL (ESTRACE) 2 MG tablet; Take 1 tablet (2 mg total) by mouth once daily.  Dispense: 30 tablet; Refill: 4  -     estradioL (ESTRACE) 2 MG tablet; Take 1 tablet (2 mg total) by mouth once daily.  Dispense: 30 tablet; Refill: 11    Dysuria  -     CULTURE, URINE      Age specific counseling    Risks and benefits to continued ERT use reviewed.  Patient to continue current regimen. ERx placed today.    Cancer screening recommendations reviewed with patient today.  Bilateral breast cancer    Patient with urinary symptoms/dysuria.  Will send urine culture today.    Patient was counseled today on A.C.S. Pap guidelines and recommendations for yearly pelvic exams, mammograms and monthly self breast exams; to see her PCP for other health maintenance.     Follow up in about 1 year (around 8/29/2025) for Annual exam.    Tr Roberts IV, MD

## 2024-08-31 LAB — BACTERIA UR CULT: NO GROWTH

## 2024-09-29 ENCOUNTER — TELEPHONE (OUTPATIENT)
Dept: PHARMACY | Facility: CLINIC | Age: 49
End: 2024-09-29
Payer: COMMERCIAL

## 2024-09-29 NOTE — TELEPHONE ENCOUNTER
[facility]Population [facility] Chart Review & Patient Outreach Details For Medication Adherence Project    Reason for Outreach Encounter: 3rd Party payor non-compliance report ([employer], BCBS, C, etc)  2.  Patient Outreach Method: Reviewed Patient Chart  3.   Medication in question: losartan   LAST FILLED: 9/18/24 for 30 day supply  Hypertension Medications               losartan (COZAAR) 50 MG tablet     metoprolol succinate (TOPROL-XL) 50 MG 24 hr tablet               4.  Reviewed and or Updates Made To: Patient Chart  5. Outreach Outcomes and/or actions taken: Patient filled medication and is on track to be adherent

## 2024-10-11 ENCOUNTER — PATIENT MESSAGE (OUTPATIENT)
Dept: OBSTETRICS AND GYNECOLOGY | Facility: CLINIC | Age: 49
End: 2024-10-11
Payer: COMMERCIAL

## 2024-10-14 ENCOUNTER — PROCEDURE VISIT (OUTPATIENT)
Dept: OBSTETRICS AND GYNECOLOGY | Facility: CLINIC | Age: 49
End: 2024-10-14
Payer: COMMERCIAL

## 2024-10-14 VITALS
BODY MASS INDEX: 38.09 KG/M2 | HEIGHT: 68 IN | WEIGHT: 251.31 LBS | SYSTOLIC BLOOD PRESSURE: 136 MMHG | DIASTOLIC BLOOD PRESSURE: 74 MMHG

## 2024-10-14 DIAGNOSIS — N90.89 SKIN TAG OF VULVA: Primary | ICD-10-CM

## 2024-10-14 PROCEDURE — 56605 BIOPSY OF VULVA/PERINEUM: CPT | Mod: S$GLB,,, | Performed by: OBSTETRICS & GYNECOLOGY

## 2024-10-14 PROCEDURE — 56606 BIOPSY OF VULVA/PERINEUM: CPT | Mod: S$GLB,,, | Performed by: OBSTETRICS & GYNECOLOGY

## 2024-10-14 RX ORDER — ATORVASTATIN CALCIUM 20 MG/1
20 TABLET, FILM COATED ORAL
COMMUNITY
Start: 2024-09-03

## 2024-10-14 NOTE — PROCEDURES
Biopsy (Gynecological)    Date/Time: 10/14/2024 3:00 PM    Performed by: Tr Roberts IV, MD  Authorized by: Tr Roberts IV, MD    Local anesthesia used?: Yes (1 ml)    Anesthesia:  Local infiltration  Local anesthetic:  Lidocaine 1% with epinephrine  Anesthetic total (ml):  1    Biopsy Location:  Vulva  Vulva:     # of lesions:  2  Estimated blood loss (cc):  1   Patient tolerated the procedure well with no immediate complications.    Silver nitrate used to achieve hemostasis  Local wound care verbal instructions given by me today    Tr Roberts IV, MD

## 2024-10-14 NOTE — PROCEDURES
Biopsy (Gynecological)    Date/Time: 10/14/2024 3:00 PM    Performed by: Tr Roberts IV, MD  Authorized by: Tr Roberts IV, MD

## 2024-10-21 ENCOUNTER — TELEPHONE (OUTPATIENT)
Dept: PHARMACY | Facility: CLINIC | Age: 49
End: 2024-10-21
Payer: COMMERCIAL

## 2024-10-21 NOTE — TELEPHONE ENCOUNTER
[facility]Population [facility] Chart Review & Patient Outreach Details For Medication Adherence Project    Reason for Outreach Encounter: 3rd Party payor non-compliance report ([employer], BCBS, C, etc)  2.  Patient Outreach Method: Reviewed Patient Chart  3.   Medication in question: losartan   LAST FILLED: 10/15/24 for 90 day supply  Hypertension Medications               losartan (COZAAR) 50 MG tablet     metoprolol succinate (TOPROL-XL) 50 MG 24 hr tablet               4.  Reviewed and or Updates Made To: Patient Chart  5. Outreach Outcomes and/or actions taken: Patient filled medication and is on track to be adherent

## 2024-10-29 NOTE — PROGRESS NOTES
"CC: Well woman exam    Ninfa Aguilar is a 42 y.o. female  presents for well woman exam.  LMP: Patient's last menstrual period was 01/15/2017..  No GYN issues, problems, or complaints.  Occasional sharp pain that is at "back" of vagina.  Intermittent vasomotor symptoms reviewed.    Past Medical History:   Diagnosis Date    Abnormal Pap smear of cervix     Arthritis     Back pain     Cervical disc syndrome     Depression     Essential tremor     General anesthetics causing adverse effect in therapeutic use     patient reports she was told she "woke up" during tubal ligation    Goiter     MNG    Hypothyroidism     Lumbar disc disease     Patient is Anglican     Polycystic ovaries     PONV (postoperative nausea and vomiting)     Vitamin D deficiency      Past Surgical History:   Procedure Laterality Date    APPENDECTOMY      DILATION AND CURETTAGE OF UTERUS      HYSTERECTOMY  2017    [facility] ov [facility] situ     OVARIAN CYST SURGERY Right     SHOULDER SURGERY      right    tubal lig       Social History     Social History    Marital status:      Spouse name: N/A    Number of children: N/A    Years of education: N/A     Occupational History    Not on file.     Social History Main Topics    Smoking status: Former Smoker     Packs/day: 0.10     Years: 5.00     Types: Cigarettes     Quit date: 1992    Smokeless tobacco: Former User    Alcohol use 0.6 - 1.2 oz/week     1 - 2 Glasses of wine per week      Comment: daily    Drug use: No    Sexual activity: Yes     Partners: Male     Birth control/ protection: None      Comment:  to Mane      Other Topics Concern    Not on file     Social History Narrative    No narrative on file     Family History   Problem Relation Age of Onset    Cancer Mother 50        breast    Breast cancer Mother     COPD Father     Peripheral vascular disease Father     Asperger's syndrome Daughter     Thyroid disease " "Daughter         Hashimotos'     OB History      Para Term  AB Living    3 3 3          SAB TAB Ectopic Multiple Live Births                       /80   Ht 5' 8" (1.727 m)   Wt 109.8 kg (242 lb 1 oz)   LMP 01/15/2017   BMI 36.81 kg/m²       ROS:  GENERAL: Denies weight gain or weight loss. Feeling well overall.   SKIN: Denies rash or lesions.   HEAD: Denies head injury or headache.   NODES: Denies enlarged lymph nodes.   CHEST: Denies chest pain or shortness of breath.   CARDIOVASCULAR: Denies palpitations or left sided chest pain.   ABDOMEN: No abdominal pain, constipation, diarrhea, nausea, vomiting or rectal bleeding.   URINARY: No frequency, dysuria, hematuria, or burning on urination.  REPRODUCTIVE: See HPI.   BREASTS: The patient performs breast self-examination and denies pain, lumps, or nipple discharge.   HEMATOLOGIC: No easy bruisability or excessive bleeding.   MUSCULOSKELETAL: Denies joint pain or swelling.   NEUROLOGIC: Denies syncope or weakness.   PSYCHIATRIC: Denies depression, anxiety or mood swings.    PHYSICAL EXAM:  APPEARANCE: Well nourished, well developed, in no acute distress.  AFFECT: WNL, alert and oriented x 3  SKIN: No acne or hirsutism  NECK: + thyromegaly  NODES: No inguinal, cervical, axillary, or femoral lymph node enlargement  CHEST: Good respiratory effect  ABDOMEN: Soft.  No tenderness or masses.  No hepatosplenomegaly.  No hernias.  BREASTS: Symmetrical, no skin changes or visible lesions.  No palpable masses, nipple discharge bilaterally.  PELVIC: Normal external genitalia without lesions.  Normal hair distribution.  Adequate perineal body, normal urethral meatus.  Vagina moist and well rugated without lesions or discharge.  Cervix/Uterus absent.  No significant cystocele or rectocele.  Bimanual exam reveals adnexa without masses or tenderness.    EXTREMITIES: No edema.    Encounter for gynecological examination without abnormal finding    Menopausal " symptoms  -     estradiol (ESTRACE) 2 MG tablet; Take 1 tablet (2 mg total) by mouth once daily.  Dispense: 90 tablet; Refill: 3    Vaginal Pap smear  -     Liquid-based pap smear, screening    Visit for screening mammogram  -     Mammo Digital Screening Bilat with [facility]os[facility] CAD; Future; Expected date: 05/10/2018      Age specific counseling    Vaginal papsmear done.    Patient counseled on ERT - continue estrace 2mg/day.    Patient was counseled today on A.C.S. Pap guidelines and recommendations for yearly pelvic exams, mammograms and monthly self breast exams; to see her PCP for other health maintenance.     Annual exam:  1 year    Tr Roberts IV, MD                 PRINCIPAL PROCEDURE  Procedure: Right total knee arthroplasty  Findings and Treatment:

## 2024-12-05 PROBLEM — R29.898 SHOULDER WEAKNESS: Status: RESOLVED | Noted: 2021-08-17 | Resolved: 2024-12-05

## 2024-12-05 PROBLEM — M25.669 KNEE STIFFNESS: Status: RESOLVED | Noted: 2021-09-07 | Resolved: 2024-12-05

## 2024-12-16 PROBLEM — M50.30 DEGENERATIVE DISC DISEASE, CERVICAL: Status: ACTIVE | Noted: 2024-12-16

## 2024-12-16 PROBLEM — M47.812 CERVICAL SPONDYLOSIS: Status: ACTIVE | Noted: 2024-12-16

## 2025-02-24 ENCOUNTER — TELEPHONE (OUTPATIENT)
Dept: PHARMACY | Facility: CLINIC | Age: 50
End: 2025-02-24
Payer: COMMERCIAL

## 2025-02-24 NOTE — TELEPHONE ENCOUNTER
[facility]Population [facility] Chart Review & Patient Outreach Details For Medication Adherence Project    Reason for Outreach Encounter: 3rd Party payor non-compliance report ([employer], BCBS, C, etc)  2.  Patient Outreach Method: Reviewed patient chart   3.   Medication in question:    Hypertension Medications              losartan (COZAAR) 50 MG tablet     metoprolol succinate (TOPROL-XL) 50 MG 24 hr tablet                  LF 30 ds 1/30/25    4.  Reviewed and or Updates Made To: Patient Chart  5. Outreach Outcomes and/or actions taken: Patient filled medication and is on track to be adherent  Additional Notes:

## 2025-02-25 ENCOUNTER — TELEPHONE (OUTPATIENT)
Dept: NEUROSURGERY | Facility: CLINIC | Age: 50
End: 2025-02-25
Payer: COMMERCIAL

## 2025-02-25 NOTE — TELEPHONE ENCOUNTER
----- Message from Amado sent at 2/25/2025  2:22 PM CST -----  Regarding: return call  Contact: patient  Type:  Patient Returning CallWho Called:patientWho Left Message for Patient:staffDoes the patient know what this is regarding?:wanting to be scheduled/ new patient/ no referralWould the patient rather a call back or a response via [facility]? Please call to [facility] Call Back Number:[phone] Information:

## 2025-02-27 ENCOUNTER — OFFICE VISIT (OUTPATIENT)
Dept: NEUROLOGY | Facility: CLINIC | Age: 50
End: 2025-02-27
Payer: COMMERCIAL

## 2025-02-27 VITALS
RESPIRATION RATE: 17 BRPM | WEIGHT: 250.13 LBS | DIASTOLIC BLOOD PRESSURE: 67 MMHG | SYSTOLIC BLOOD PRESSURE: 120 MMHG | HEIGHT: 69 IN | BODY MASS INDEX: 37.05 KG/M2 | HEART RATE: 60 BPM | TEMPERATURE: 97 F

## 2025-02-27 DIAGNOSIS — M54.2 CHRONIC NECK AND BACK PAIN: Primary | ICD-10-CM

## 2025-02-27 DIAGNOSIS — G95.9 CERVICAL MYELOPATHY: ICD-10-CM

## 2025-02-27 DIAGNOSIS — M54.9 CHRONIC NECK AND BACK PAIN: Primary | ICD-10-CM

## 2025-02-27 DIAGNOSIS — E66.01 SEVERE OBESITY (BMI 35.0-39.9) WITH COMORBIDITY: ICD-10-CM

## 2025-02-27 DIAGNOSIS — G89.29 CHRONIC NECK AND BACK PAIN: Primary | ICD-10-CM

## 2025-02-27 PROCEDURE — 3078F DIAST BP <80 MM HG: CPT | Mod: CPTII,S$GLB,, | Performed by: PHYSICIAN ASSISTANT

## 2025-02-27 PROCEDURE — 3008F BODY MASS INDEX DOCD: CPT | Mod: CPTII,S$GLB,, | Performed by: PHYSICIAN ASSISTANT

## 2025-02-27 PROCEDURE — 99213 OFFICE O/P EST LOW 20 MIN: CPT | Mod: S$GLB,,, | Performed by: PHYSICIAN ASSISTANT

## 2025-02-27 PROCEDURE — 4010F ACE/ARB THERAPY RXD/TAKEN: CPT | Mod: CPTII,S$GLB,, | Performed by: PHYSICIAN ASSISTANT

## 2025-02-27 PROCEDURE — 99999 PR PBB SHADOW E&M-EST. PATIENT-LVL V: CPT | Mod: PBBFAC,,, | Performed by: PHYSICIAN ASSISTANT

## 2025-02-27 PROCEDURE — 3074F SYST BP LT 130 MM HG: CPT | Mod: CPTII,S$GLB,, | Performed by: PHYSICIAN ASSISTANT

## 2025-02-27 PROCEDURE — 1159F MED LIST DOCD IN RCRD: CPT | Mod: CPTII,S$GLB,, | Performed by: PHYSICIAN ASSISTANT

## 2025-02-27 PROCEDURE — 1160F RVW MEDS BY RX/DR IN RCRD: CPT | Mod: CPTII,S$GLB,, | Performed by: PHYSICIAN ASSISTANT

## 2025-02-27 NOTE — PROGRESS NOTES
"  [facility]  [address] [facility] [address]  [place] [address]  Phone:[phone]  Fax: [phone]  Follow up visit     Patient Name: Ninfa Aguilar  : 1975  MRN:  [id number]  Today: 2025   LOV: 2024  chief complaint: Headache    PCP: Yahaira Almanza MD.       Assessment/PLAN:   Ninfa Aguilar is a 49 y.o. right handed, female with a PMHx of: neck pain/back pain, essential tremor, kidney stones (?), polycystic ovaries, vitamin D defic, HA,  and depression/anxiety   whom presents solo in follow up. Her concerns are neck and back pain and would like to see a surgeon for an opinion. "I have done pain management, medications and PT, its not helping." I discussed in this HA clinic we don't address back and neck pain, and I offered for her to get a second opinion with pain management. She declined and asked to have a referral to a surgeon. I placed on her behalf.     If her HA come back, she is always welcome to return for an opinion.      -from my note in ," HA appear to be cervicogenic HA, and at times migrainous.  "       Review:    ICD-10-CM ICD-9-CM   1. Chronic neck and back pain  M54.2 723.1    M54.9 724.5    G89.29 338.29   2. Severe obesity (BMI 35.0-39.9) with comorbidity  E66.01 278.01   3. Cervical myelopathy  G95.9 721.1     Orders Placed This Encounter    Ambulatory consult to Neurosurgery            Noted Dx #s 2 and 3 can indirectly affect HA and help guide/limit treatment options. I will defer to PCP/other specialists to help manage.     All test results as well as any necessary instructions were reviewed and discussed with patient.         Patient to return to PCP/other specialists for all other problems  Patient to continue on all medications as Rx'd  Full office note available online   RTO-PRN   The patient indicates understanding of these issues and agrees to the plan.    HPI:   Ninfa Aguilar is a 49 " "y.o.right handed, female with a PMHx of: neck pain/back pain, essential tremor, kidney stones (?), polycystic ovaries, vitamin D defic, HA,  and depression/anxiety   whom presents solo in follow up for HA.     At LOV (5/1/2024): qulipta, parafon forte and deltasone.   States she is not really having "HA" but neck pain and back pain  Saw pain management here, did not feel it helped  Saw a different provider gave her a "neck epidural in office" and was good for 2 years  States having some ear pain, has been told its from her neck   Never took medicines written at last visit, was looking to address neck and back pain here today   No mention of falls, or new weakness or new sensory changes       From previous visit:  parafon forte written, PT ordered and a course of deltasone written.   Parafon forte helping overall   Never took steroids given at last visit, saving that for her trip to [place]  Will have some neck discomfort a few times a week lasting 30s, has seen pain management, had test shots for possible ablation, this has her concerned and she wants to discuss other options (has pending appt with pain management this week)   HA pain now reported as 30 days /30 lasting "hours"   Has constant dull pressure sensation in head  +photophobia and phonophobia  States she saw ophthalmologist recently and was told everything was okay   Has HA presently   She is looking to try PT closer to home and would like a new script/referral     From previous visits:   [facility]      HA HPI:  Start:HA in late 90s d/t meningitis, however got better over time (was on topamax in past, may have had a kidney stone... ?) and concurrently multi year hx of neck pain (was followed by pain management, last intervention was 2+ years ago, felt it really helped), however in Sept 2023 started developing HA, had a "thunder clap HA" and eventually went to ER d/t the discomfort, has had 2 more sudden " attack of pain. Has been having more neck pain, historically would radiate into shoulders but now radiating more in the top occipitalis/vertex and occasionally into the parietal region.   History of trauma (no), History of CNS infection (yes, meningitis), History of Stroke (no)  Location:base of neck to the occipitals, vertex/parietal region   Severity: moderate to severe   Duration:>24 hours   Frequency:4 days of HA in past month   Neck pain: daily for many years   Associated factors (bolded positive) WITH HA ( or migraine): Nausea, vomiting, photophobia, phonophobia, tinnitus, scalp pain, vision loss, diplopia, scintillations, eye pain, jaw pain, weakness?    Tried:tizanidine  Triggers (in bold): coitus/orgasm(ER visit, 10/3/2023 notes), stress, lack of sleep, too much caffeine, too little caffeine, weather change, seasonal change, strong odours, bright lights, sunlight, food    Last HA: has one now      Positives in bold: Hx of Kidney Stones, asthma, GI bleed, osteoporosis, CAD/MI, CVA/TIA, DM    Imaging on file: MRI Brain and MRI C spine 2023 (as below)   Therapies tried in past: (failures to be marked, if known---why did it fail?)  Ibuprofen   Celebrex  Topamax  Bupropion  Gabapentin  Metoprolol  Losartan  Trazodone  Tramadol  Zanaflex  Ultram  Flexeril  Parafon forte  Soma  PT  Deltasone  qulipta      Medication Reconciliation:   Current Outpatient Medications   Medication Sig Dispense Refill    aspirin 81 MG Chew       atorvastatin (LIPITOR) 20 MG tablet Take 1 tablet (20 mg total) by mouth every evening. 30 tablet 2    buPROPion (WELLBUTRIN XL) 150 MG TB24 tablet Take 1 tablet (150 mg total) by mouth once daily. 90 tablet 3    busPIRone (BUSPAR) 5 MG Tab Take 1 tablet (5 mg total) by mouth 2 (two) times daily. 60 tablet 1    celecoxib (CELEBREX) 200 MG capsule TAKE 1 CAPSULE(200 MG) BY MOUTH TWICE DAILY 60 capsule 2    chlorzoxazone (PARAFON FORTE) 500 mg Tab 1 pill am, 1 pill noon as needed for neck pain  "and headaches 60 tablet 5    cholecalciferol, vitamin D3, (VITAMIN D3) 25 mcg (1,000 unit) capsule Take 5,000 Units by mouth.      estradioL (ESTRACE) 2 MG tablet Take 1 tablet (2 mg total) by mouth once daily. 30 tablet 11    gabapentin (NEURONTIN) 300 MG capsule Take 1 capsule (300 mg total) by mouth 3 (three) times daily. 270 capsule 0    ibuprofen (ADVIL,MOTRIN) 200 MG tablet Take 200 mg by mouth every 6 (six) hours as needed for Pain.      levothyroxine (SYNTHROID) 100 MCG tablet Synthroid 100 mcg tablet   TAKE 1 TABLET BY MOUTH EVERY DAY      losartan (COZAAR) 50 MG tablet       methocarbamoL (ROBAXIN) 750 MG Tab Take 1 tablet (750 mg total) by mouth daily as needed (muscle tightness). 15 tablet 3    metoprolol succinate (TOPROL-XL) 50 MG 24 hr tablet       traMADoL (ULTRAM) 50 mg tablet Take 1 tablet (50 mg total) by mouth every 24 hours as needed for Pain. 12 tablet 0    vitamin E 1000 UNIT capsule Take 1,000 Units by mouth.       No current facility-administered medications for this visit.     Review of patient's allergies indicates:   Allergen Reactions    Latex, natural rubber Rash    Iodinated contrast media     Iodine     Ozempic [semaglutide] Other (See Comments)     SI    Iodine and iodide containing products Rash     Eats shellfish.  Eats shellfish.  Eats shellfish.    Meloxicam Hives     Can take ibuprofen       PMHx:  Past Medical History:   Diagnosis Date    Abnormal Pap smear of cervix     Arthritis     Back pain     Cervical disc syndrome     Depression     Essential tremor     General anesthetics causing adverse effect in therapeutic use     patient reports she was told she "woke up" during tubal ligation    Goiter     MNG    Headache     Hypothyroidism     Lumbar disc disease     Patient is Hoahaoism     Polycystic ovaries     PONV (postoperative nausea and vomiting)     Sleep apnea     Vitamin D deficiency      Past Surgical History:   Procedure Laterality Date    APPENDECTOMY      " BREAST BIOPSY Left     Excisional removal of lymph node, benign    COLONOSCOPY N/A 2025    Procedure: COLONOSCOPY;  Surgeon: Aditya Ramirez Jr., MD;  Location: [facility];  Service: Endoscopy;  Laterality: N/A;    DILATION AND CURETTAGE OF UTERUS      FOOT MASS EXCISION Right 10/30/2018    Procedure: EXCISION, MASS, FOOT probable fibroma;  Surgeon: Ha Caicedo MD;  Location: [address];  Service: Orthopedics;  Laterality: Right;    HYSTERECTOMY  2017    DLH ov in situ     INJECTION OF ANESTHETIC AGENT AROUND MEDIAL BRANCH NERVES INNERVATING CERVICAL FACET JOINT Bilateral 4/3/2024    Procedure: Block-nerve-medial branch-cervical     C3/4, C4/5;  Surgeon: Joshua Esparza MD;  [facility];  Service: Pain Management;  Laterality: Bilateral;    INJECTION OF ANESTHETIC AGENT AROUND MEDIAL BRANCH NERVES INNERVATING CERVICAL FACET JOINT Bilateral 2024    Procedure: Block-nerve-medial branch-cervical   C3/4, C4/5;  Surgeon: Joshua Esparza MD;  Location: [facility];  Service: Pain Management;  Laterality: Bilateral;    OVARIAN CYST SURGERY Right     SHOULDER SURGERY      right    tubal lig         Fhx:  Family History   Problem Relation Name Age of Onset    COPD Father      Peripheral vascular disease Father      Cancer Mother  50        breast    Breast cancer Mother      Asperger's syndrome Daughter      Thyroid disease Daughter          Hashimotos'    Ovarian cancer Neg Hx      Colon cancer Neg Hx         Shx: etoh use, 3 glasses of wine at night   Social History     Socioeconomic History    Marital status:    Tobacco Use    Smoking status: Former     Current packs/day: 0.00     Average packs/day: 0.1 packs/day for 5.0 years (0.5 ttl pk-yrs)     Types: Cigarettes     Start date: 1987     Quit date: 1992     Years since quittin.6    Smokeless tobacco: Former   Substance and Sexual Activity    Alcohol use: Yes     Alcohol/week: 1.0 - 2.0 standard drink of alcohol      Types: 1 - 2 Glasses of wine per week     Comment: daily    Drug use: No    Sexual activity: Yes     Partners: Male     Birth control/protection: None, See Surgical Hx     Comment:  to [place]      Social Drivers of Health     Financial Resource Strain: Low Risk  (2024)    Overall Financial Resource Strain (CARDIA)     Difficulty of Paying Living Expenses: Not very hard   Food Insecurity: No Food Insecurity (2024)    Hunger Vital Sign     Worried About Running Out of Food in the Last Year: Never true     Ran Out of Food in the Last Year: Never true   Transportation Needs: No Transportation Needs (2024)    PRAPARE - Transportation     Lack of Transportation (Medical): No     Lack of Transportation (Non-Medical): No   Physical Activity: Insufficiently Active (2024)    Exercise Vital Sign     Days of Exercise per Week: 3 days     Minutes of Exercise per Session: 30 min   Stress: Stress Concern Present (2024)    [employer] - Occupational Stress Questionnaire     Feeling of Stress : Rather much   Housing Stability: Low Risk  (2024)    Housing Stability Vital Sign     Unable to Pay for Housing in the Last Year: No     Number of Places Lived in the Last Year: 1     Unstable Housing in the Last Year: No           Labs:   Reviewed in chart     Imagin2023 MRI brain (report): IMPRESSION:     Motion degradation.     Small subcortical foci of white matter hyperintensity within the right frontal lobe, nonspecific.     Incidental sinus disease.    -Noted she brings up concern that her cerebellar tonsils are low lying, I reviewed imaging studies of C spine that go back to 2016 and from my view, it appears similar. I discussed I am not a radiologist but from my view, it does appear she has had this for a long time and doesn't appear to have drastically changed. She voiced agreement.  MICHAEL MELVIN 2025       10/18/2023 MRI C spine (report): HISTORY: Neck  pain.     Multiplanar pre and postcontrast imaging are performed before and following intravenous administration of 11.5 mL Gadavist..     Comparison is made to examination of 12/21/2021. There is degradation of the examination by patient motion.     The cervical vertebral bodies are appropriately maintained in height. Vertebral body alignment is satisfactory. There is mild disc space narrowing at C5-6 and C6-7. No pathologic marrow replacement demonstrated.     At C2-3, shallow disc bulging and posterior osteophytic ridging results in mild mass effect upon the ventral margin of the thecal sac towards the left of midline without significant encroachment of the central spinal canal. There is minor left foraminal narrowing.     At C3-4, shallow bulging of the disc margin contributes to mild mass effect upon the ventral margin of the thecal sac without significant central canal stenosis. There is mild to moderate left foraminal narrowing.     At C4-5, there is shallow bulging of the disc margin and small posteriorly directed marginal osteophyte formation contributing to mild mass effect upon the thecal sac diffusely. The central canal is not significantly encroached. There is mild left foraminal narrowing.     At C5-6, broad-based bulging of the disc margin and posterior osteophyte formation results in mild diffuse mass effect upon the thecal sac without direct cord impingement. There is moderately severe right foraminal and moderate left foraminal narrowing.     At C6-7, there is shallow bulging of the disc margin resulting in mild mass effect upon the thecal sac. There are mild degrees of bilateral foraminal narrowing.     At C7-T1, minimal disc bulging contributes to minor mass effect upon the thecal sac. There are facet degenerative changes resulting in moderate left foraminal and mild-moderate right foraminal narrowing.     The visualized segment of the cervical spinal cord appears unremarkable.     No pathologic  "enhancement is demonstrated following contrast administration.     IMPRESSION:     Degradation by patient motion.     Degenerative disc changes contributing to central canal and foraminal encroachment as detailed.    Other testing:  Reviewed in chart     Note: I have independently reviewed any/all imaging/labs/tests and agree with the report (s) as documented.  Any discrepancies will be as noted/demarcated by free text.  MICHAEL MELVIN 2/27/2025                     ROS:   Review Of Systems (questions asked, positive or additions in BOLD)  Gen: Weight change-gain, fatigue/malaise, pyrexia   HEENT: Tinnitus, headache,  blurred vision, eye pain, diplopia, photophobia,  nose bleeds, congestion, sore throat, jaw pain, scalp pain, neck stiffness   Card: Palpitations, CP   Pulm: SOB, cough   Vas: Easy bruising, easy bleeding   GI: N/V/D/C, incontinence, hematemesis, hematochezia    : incontinence, hematuria   Endocrine: Temp intolerance, polyuria, polydipsia   M/S: Neck pain, myalgia, back pain, joint pain, falls    Neuro: PER HPI   PSY: Memory loss, confusion, depression, anxiety, trouble in sleep          EXAM:   /67 (BP Location: Left arm, Patient Position: Sitting)   Pulse 60   Temp 97.3 °F (36.3 °C) (Temporal)   Resp 17   Ht 5' 9" (1.753 m)   Wt 113.5 kg (250 lb 1.8 oz)   LMP 01/15/2017   BMI 36.94 kg/m²    GEN:  NAD  HEENT: NC/AT   EXTREM:  no edema present.    NEURO:  Mental Status:  Awake, alert and appropriately oriented to time, place, and person.  Normal attention and concentration.  Speech is fluent and appropriate language function (I.e., comprehension), hand wringing behaviour present again at today's visit     Cranial Nerves:      Extraocular movements are intact and without nystagmus.  Visual fields are full to confrontation testing.  Facial movement is symmetric.     Hearing is normal. Uvula in midline.  DROM of neck in all (6) directions, shoulder shrug symmetrical. Tongue in midline without " fasiculation.     Motor: antigravity in all limbs   No drift  Head tremor present  No resting tremor but did have some R>L UE tremor in winged position          DTR's:                                            R              L                  Knee jerk 2+ 2+             Gait and Stance: Normal manner of stance and gait function testing.          This document has been electronically signed by Mr. Riley COATESRustam France MPA, PA-C on 2/27/2025, I have personally typed this message using the EMR.       Dr Chapin MD  was available during today's visit.            CC: Yahaira Almanza MD

## 2025-03-10 NOTE — TELEPHONE ENCOUNTER
Returned the patient's call and left a voicemail in regard to their appointment on 4/8 with Juliette Cheney PA-C. We wanted to verify with you if you were coming in for your visco gel injections. The patient can call the office at [phone] to further discuss or respond to the message in the portal.    distress, nasal flaring or retractions.      Breath sounds: Normal breath sounds. No stridor or decreased air movement. No wheezing, rhonchi or rales.   Abdominal:      General: Bowel sounds are normal.      Palpations: Abdomen is soft.      Tenderness: There is no abdominal tenderness. There is no guarding.   Musculoskeletal:      Cervical back: No tenderness.   Skin:     General: Skin is warm.      Capillary Refill: Capillary refill takes less than 2 seconds.   Neurological:      Mental Status: She is alert.   Psychiatric:         Mood and Affect: Mood normal.         Behavior: Behavior normal. Behavior is cooperative.         Thought Content: Thought content normal.         Judgment: Judgment normal.       Vitals:    03/10/25 1248   BP: 102/68   Pulse: 74   Temp: 98.2 °F (36.8 °C)   TempSrc: Tympanic   SpO2: 97%   Weight: 53.5 kg (118 lb)   Height: 1.549 m (5' 1\")         Assessment:       Diagnosis Orders   1. Non-recurrent acute suppurative otitis media of right ear without spontaneous rupture of tympanic membrane        2. Cough, unspecified type  POCT COVID-19 & Influenza A/B            Plan:   Assessment & Plan   Return if symptoms worsen or fail to improve.     Orders Placed This Encounter   Procedures    POCT COVID-19 & Influenza A/B     Office Visit on 03/10/2025   Component Date Value Ref Range Status    SARS-COV-2, POC 03/10/2025 Not-Detected  Not Detected Final    Influenza A Antigen, POC 03/10/2025 Negative  Not Detected Final    Influenza B Antigen, POC 03/10/2025 Negative  Not Detected Final    Vendor and kit name 03/10/2025 Veritor   Final    Internal Control 03/10/2025 pass   Final    Lot/Kit Number 03/10/2025 [id number]   Final    Lot/Kit  date: 03/10/2025 2026   Final     Orders Placed This Encounter   Medications    amoxicillin (AMOXIL) 500 MG capsule     Sig: Take 1 capsule by mouth 2 times daily for 10 days     Dispense:  20 capsule     Refill:  0       Discussed negative POC Covid

## 2025-03-13 ENCOUNTER — OFFICE VISIT (OUTPATIENT)
Dept: NEUROSURGERY | Facility: CLINIC | Age: 50
End: 2025-03-13
Payer: COMMERCIAL

## 2025-03-13 VITALS
RESPIRATION RATE: 18 BRPM | HEIGHT: 69 IN | DIASTOLIC BLOOD PRESSURE: 79 MMHG | HEART RATE: 66 BPM | BODY MASS INDEX: 37.06 KG/M2 | WEIGHT: 250.25 LBS | SYSTOLIC BLOOD PRESSURE: 135 MMHG

## 2025-03-13 DIAGNOSIS — M54.16 LUMBAR RADICULOPATHY, CHRONIC: ICD-10-CM

## 2025-03-13 DIAGNOSIS — M54.2 CERVICALGIA: ICD-10-CM

## 2025-03-13 DIAGNOSIS — G95.9 CERVICAL MYELOPATHY: ICD-10-CM

## 2025-03-13 DIAGNOSIS — M54.50 LUMBAR PAIN: Primary | ICD-10-CM

## 2025-03-13 DIAGNOSIS — M54.6 THORACIC BACK PAIN, UNSPECIFIED BACK PAIN LATERALITY, UNSPECIFIED CHRONICITY: ICD-10-CM

## 2025-03-13 PROCEDURE — 4010F ACE/ARB THERAPY RXD/TAKEN: CPT | Mod: CPTII,S$GLB,,

## 2025-03-13 PROCEDURE — 3078F DIAST BP <80 MM HG: CPT | Mod: CPTII,S$GLB,,

## 2025-03-13 PROCEDURE — 99213 OFFICE O/P EST LOW 20 MIN: CPT | Mod: S$GLB,,,

## 2025-03-13 PROCEDURE — 3075F SYST BP GE 130 - 139MM HG: CPT | Mod: CPTII,S$GLB,,

## 2025-03-13 PROCEDURE — 3008F BODY MASS INDEX DOCD: CPT | Mod: CPTII,S$GLB,,

## 2025-03-13 NOTE — PROGRESS NOTES
"Neurosurgery History & Physical    Patient ID: Ninfa Aguilar is a 49 y.o. female.    Chief Complaint   Patient presents with    Neck Pain     Neck pain   B/L hand numbness and tingling with intense itching at night  Headaches into back of head       Interval HPI 03/13/2025:  Ms. Aguilar returns today for cervical and lumbar spine pain.    CERVICAL:  She reports cervical pain for 10+ years.  States that she had right-sided shoulder surgery in 2010 then developed cervical pain afterward.  Pain increased in beginning of 2024.  Denies precipitating events such as injuries, trauma, or falls.  Describes pain as "arthritic" and "muscular".  Pain radiates into occiput.  Pain increases with positional movements and sleep.  Denies radiculopathy.  Reports left arm numbness and subjective weakness.  States that she cannot raise her arms for simple chores like cutting vegetables.  She is right-hand dominant and reports worsening handwriting due to essential tremor.  Reports that she can  objects without difficulty but frequently drops objects.  Followed with Dr. Esquivel for cervical ESIs for several years.  States that ESIs provided good pain relief for 1.5 years at a time.  Dr. Esquivel is no longer on her insurance.  Was following with Dr. Esparza for MBBs, last visit 05/06/2024.  Reports that she had a reaction to each MBB and did not undergo C3-4 C4-5 RFA.  Obtains chiropractor care but does not have her neck adjusted.  Participating in PT which is preventing pain from getting worse, however, has not noticed any improvements.      Pain Intervention History:  - s/p 1st diagnostic bilateral C3/4 and C4/5 medial branch blocks on 4/3/24 with 80% relief lasting for 8 hours.  Pre procedure pain: 7   Post procedure pain: 1  - s/p 2nd diagnostic bilateral C3/4 and C4/5 medial branch blocks on 4/19/24 with 90% relief lasting for 6 hours.  Pre procedure pain: 8  Post procedure pain: 1    LUMBAR:  She reports lumbar pain for " "years.  States that it started in 1985 when she suffered a possible tailbone fracture and has had pain since that time.  Describes her pain as a constant "sore" or "pulled" muscle feeling.  States that she has sharp, shooting pain down right leg to knee in L4 distribution and states that her IT band feels tight.  Denies numbness, tingling, and weakness but states that her leg feel "heavy" at times.  Reports that she has needed to wear Poise pads since hysterectomy (2017) or slightly before for urinary incontinence.  Reports that she has to change her pads multiple times per day.  Reports that after undergoing hysterectomy her LBP improved for a while then worse again.  Denies bowel dysfunction.  Denies gait abnormality.  Using a seat warmer or bending over improves pain.  Sitting down increases pain, states that she gets "stiff".  Patient receives cortisone injections yearly into bilateral knees for OA.     Interventions: Parafon Forte (works pretty well), Gabapentin (2 pills before bed), Celebrex (if pain gets bad), ice/heat (helps a lot), massage, aquatic therapy (helps a lot)      Patient has PMHx of Anabaptist, obesity, HTN, HLD, hypothyroidism, neuropathy, OA, essential tremor, depression, and s/p hysterectomy (2017).    HPI 08/03/2021:   Ms. Aguilar is a 45 year old female with depression, essential tremor, hypothyroidism who presents today for neck pain. She has history of neck pain for many years. She has seen Dr. Gao in [place] and Dr. Mariee at [facility] in 2016 who recommended conservative management as opposed to surgical consideration. Since then she has managed supportively. She reports she has had other health issues to deal with. She deals with flare ups of neck pain that typically occur form "sleeping wrong" and resolve with time. Over the last few weeks, her pain started and has not improved. She notes swelling over the anterior cervical paraspinous region and tenderness to the shoulder " "and clavicle. She reports falling at the grocery store a few weeks ago but is unsure of any injury at that time. She saw her PCP who did not highly suspect lymphadenopathy but recommended followup in a few weeks for possible ultrasound and evaluation with NSGY.. She has not had imaging. She has participated in PT a few years back and had injections with PM in the past. She reports various areas of numbness mainly in the right arm. Denies weakness, gait instability. She has chronic stress incontinence for several years- this was noted in Dr. Mariee' note from 2016. She takes Soma, Robaxin for spasms and is using voltaren cream to the area. She has not taken and is hesitant to consider oral NSAIDs d/t BP issues.     Review of Systems   Constitutional:  Negative for activity change and fever.   Eyes:  Negative for visual disturbance.   Respiratory:  Negative for shortness of breath.    Cardiovascular:  Negative for chest pain.   Gastrointestinal:  Negative for nausea and vomiting.   Endocrine: Negative for cold intolerance, heat intolerance, polydipsia, polyphagia and polyuria.   Genitourinary:  Negative for decreased urine volume, difficulty urinating and frequency.   Musculoskeletal:  Positive for back pain and neck pain. Negative for gait problem.   Neurological:  Positive for numbness and headaches. Negative for dizziness, tremors, seizures, syncope, facial asymmetry, speech difficulty, weakness and light-headedness.   Psychiatric/Behavioral:  Negative for confusion.        Past Medical History:   Diagnosis Date    Abnormal Pap smear of cervix     Arthritis     Back pain     Cervical disc syndrome     Depression     Essential tremor     General anesthetics causing adverse effect in therapeutic use     patient reports she was told she "woke up" during tubal ligation    Goiter     MNG    Headache     Hypothyroidism     Lumbar disc disease     Patient is Mandaen     Polycystic ovaries     PONV " "(postoperative nausea and vomiting)     Sleep apnea     Vitamin D deficiency      Social History[1]  Family History   Problem Relation Name Age of Onset    COPD Father      Peripheral vascular disease Father      Cancer Mother  50        breast    Breast cancer Mother      Asperger's syndrome Daughter      Thyroid disease Daughter          Hashimotos'    Ovarian cancer Neg Hx      Colon cancer Neg Hx       Review of patient's allergies indicates:   Allergen Reactions    Latex, natural rubber Rash    Iodinated contrast media     Iodine     Ozempic [semaglutide] Other (See Comments)     SI    Iodine and iodide containing products Rash     Eats shellfish.  Eats shellfish.  Eats shellfish.    Meloxicam Hives     Can take ibuprofen     Current Medications[2]  Blood pressure 135/79, pulse 66, resp. rate 18, height 5' 9" (1.753 m), weight 113.5 kg (250 lb 3.6 oz), last menstrual period 01/15/2017.      Neurological Exam  Mental Status  Awake, alert and oriented to person, place and time. Speech is normal. Language is fluent with no aphasia.    Cranial Nerves  CN II: Visual acuity is normal. Visual fields full to confrontation.  CN III, IV, VI: Extraocular movements intact bilaterally. Normal lids and orbits bilaterally. Pupils equal round and reactive to light bilaterally.  CN V: Facial sensation is normal.  CN VII: Full and symmetric facial movement.  CN VIII: Hearing is normal.  CN IX, X: Palate elevates symmetrically. Normal gag reflex.  CN XI: Shoulder shrug strength is normal.  CN XII: Tongue midline without atrophy or fasciculations.    Motor   Strength is 5/5 throughout all four extremities.    Sensory  Light touch is normal in upper and lower extremities.     Reflexes                                            Right                      Left  Biceps                                 2+                         2+  Patellar                                2+                         2+    Right pathological reflexes: " Robert's absent. Ankle clonus absent.  Left pathological reflexes: Robert's absent. Ankle clonus absent.    Gait  Casual gait is normal including stance, stride, and arm swing.      Physical Exam  Eyes:      General: Lids are normal.      Extraocular Movements: Extraocular movements intact.      Pupils: Pupils are equal, round, and reactive to light.   Neurological:      Motor: Motor strength is normal.     Deep Tendon Reflexes:      Reflex Scores:       Bicep reflexes are 2+ on the right side and 2+ on the left side.       Patellar reflexes are 2+ on the right side and 2+ on the left side.  Psychiatric:         Speech: Speech normal.         Imaging:  MRI cervical spine with and without contrast dated 11/29/2023 personally reviewed and discussed with patient.  FINDINGS:  The cervical vertebral bodies are appropriately maintained in height. Vertebral body alignment is satisfactory. There is mild disc space narrowing at C5-6 and C6-7. No pathologic marrow replacement demonstrated.     At C2-3, shallow disc bulging and posterior osteophytic ridging results in mild mass effect upon the ventral margin of the thecal sac towards the left of midline without significant encroachment of the central spinal canal. There is minor left foraminal narrowing.     At C3-4, shallow bulging of the disc margin contributes to mild mass effect upon the ventral margin of the thecal sac without significant central canal stenosis. There is mild to moderate left foraminal narrowing.     At C4-5, there is shallow bulging of the disc margin and small posteriorly directed marginal osteophyte formation contributing to mild mass effect upon the thecal sac diffusely. The central canal is not significantly encroached. There is mild left foraminal narrowing.     At C5-6, broad-based bulging of the disc margin and posterior osteophyte formation results in mild diffuse mass effect upon the thecal sac without direct cord impingement. There is  moderately severe right foraminal and moderate left foraminal narrowing.     At C6-7, there is shallow bulging of the disc margin resulting in mild mass effect upon the thecal sac. There are mild degrees of bilateral foraminal narrowing.     At C7-T1, minimal disc bulging contributes to minor mass effect upon the thecal sac. There are facet degenerative changes resulting in moderate left foraminal and mild-moderate right foraminal narrowing.     The visualized segment of the cervical spinal cord appears unremarkable.     No pathologic enhancement is demonstrated following contrast administration.     IMPRESSION:     Degradation by patient motion.     Degenerative disc changes contributing to central canal and foraminal encroachment as detailed.     Electronically signed by:  Blanka Meyers MD  2023 03:05 PM [facility] Workstation: [id number]    Assessment/Plan:   Ms. Aguilar is a 50yo female with cervical spondylosis and lumbar radiculopathy.  She is intact on exam but reports worsening symptoms since last imaging obtained.  Therefore, I ordered updated imaging to include MRIs of cervical and lumbar spine and XRs of cervical, thoracic, and lumbar spine.  She will follow up in clinic to review findings.  She should continue with current medications (Gabapentin, Celebrex, muscle relaxants) and PT for her cervical spine.  She requested a referral to a new Pain Management provider.  As such, I placed a referral to Dr. Singh.  She is agreeable to the plan and will notify our office if she has any questions or concerns in the meantime.           [1]   Social History  Socioeconomic History    Marital status:    Tobacco Use    Smoking status: Former     Current packs/day: 0.00     Average packs/day: 0.1 packs/day for 5.0 years (0.5 ttl pk-yrs)     Types: Cigarettes     Start date: 1987     Quit date: 1992     Years since quittin.6    Smokeless tobacco: Former   Substance and Sexual Activity    Alcohol  use: Yes     Alcohol/week: 1.0 - 2.0 standard drink of alcohol     Types: 1 - 2 Glasses of wine per week     Comment: daily    Drug use: No    Sexual activity: Yes     Partners: Male     Birth control/protection: None, See Surgical Hx     Comment:  to [place]      Social Drivers of Health     Financial Resource Strain: Low Risk  (1/24/2024)    Overall Financial Resource Strain (CARDIA)     Difficulty of Paying Living Expenses: Not very hard   Food Insecurity: No Food Insecurity (1/24/2024)    Hunger Vital Sign     Worried About Running Out of Food in the Last Year: Never true     Ran Out of Food in the Last Year: Never true   Transportation Needs: No Transportation Needs (1/24/2024)    PRAPARE - Transportation     Lack of Transportation (Medical): No     Lack of Transportation (Non-Medical): No   Physical Activity: Insufficiently Active (1/24/2024)    Exercise Vital Sign     Days of Exercise per Week: 3 days     Minutes of Exercise per Session: 30 min   Stress: Stress Concern Present (1/24/2024)    [employer] - Occupational Stress Questionnaire     Feeling of Stress : Rather much   Housing Stability: Low Risk  (1/24/2024)    Housing Stability Vital Sign     Unable to Pay for Housing in the Last Year: No     Number of Places Lived in the Last Year: 1     Unstable Housing in the Last Year: No   [2]   Current Outpatient Medications:     aspirin 81 MG Chew, , Disp: , Rfl:     atorvastatin (LIPITOR) 20 MG tablet, Take 1 tablet (20 mg total) by mouth every evening., Disp: 30 tablet, Rfl: 2    buPROPion (WELLBUTRIN XL) 150 MG TB24 tablet, Take 1 tablet (150 mg total) by mouth once daily., Disp: 90 tablet, Rfl: 3    busPIRone (BUSPAR) 5 MG Tab, Take 1 tablet (5 mg total) by mouth 2 (two) times daily., Disp: 60 tablet, Rfl: 1    celecoxib (CELEBREX) 200 MG capsule, TAKE 1 CAPSULE(200 MG) BY MOUTH TWICE DAILY, Disp: 60 capsule, Rfl: 2    chlorzoxazone (PARAFON FORTE) 500 mg Tab, 1 pill am, 1  pill noon as needed for neck pain and headaches, Disp: 60 tablet, Rfl: 5    cholecalciferol, vitamin D3, (VITAMIN D3) 25 mcg (1,000 unit) capsule, Take 5,000 Units by mouth., Disp: , Rfl:     estradioL (ESTRACE) 2 MG tablet, Take 1 tablet (2 mg total) by mouth once daily., Disp: 30 tablet, Rfl: 11    levothyroxine (SYNTHROID) 100 MCG tablet, Synthroid 100 mcg tablet  TAKE 1 TABLET BY MOUTH EVERY DAY, Disp: , Rfl:     metoprolol succinate (TOPROL-XL) 50 MG 24 hr tablet, , Disp: , Rfl:     vitamin E 1000 UNIT capsule, Take 1,000 Units by mouth., Disp: , Rfl:     gabapentin (NEURONTIN) 300 MG capsule, Take 1 capsule (300 mg total) by mouth 3 (three) times daily., Disp: 270 capsule, Rfl: 0    ibuprofen (ADVIL,MOTRIN) 200 MG tablet, Take 200 mg by mouth every 6 (six) hours as needed for Pain. (Patient not taking: Reported on 3/13/2025), Disp: , Rfl:     losartan (COZAAR) 50 MG tablet, , Disp: , Rfl:     traMADoL (ULTRAM) 50 mg tablet, Take 1 tablet (50 mg total) by mouth every 24 hours as needed for Pain., Disp: 12 tablet, Rfl: 0

## 2025-03-17 ENCOUNTER — TELEPHONE (OUTPATIENT)
Dept: PHARMACY | Facility: CLINIC | Age: 50
End: 2025-03-17
Payer: COMMERCIAL

## 2025-03-17 NOTE — TELEPHONE ENCOUNTER
[facility]/Population Health Chart Review & Patient Outreach Details For Medication Adherence Project    Reason for Outreach Encounter: 3rd Party payor non-compliance report ([employer], [employer], C, etc)  2.  Patient Outreach Method: Reviewed patient chart   3.   Medication in question:    Hypertension Medications              losartan (COZAAR) 50 MG tablet     metoprolol succinate (TOPROL-XL) 50 MG 24 hr tablet                  losartan  last filled  3/15 for 30 day supply      4.  Reviewed and or Updates Made To: Patient Chart  5. Outreach Outcomes and/or actions taken: Patient filled medication and is on track to be adherent  Additional Notes:

## 2025-03-31 ENCOUNTER — HOSPITAL ENCOUNTER (OUTPATIENT)
Dept: RADIOLOGY | Facility: HOSPITAL | Age: 50
Discharge: HOME | End: 2025-03-31
Payer: COMMERCIAL

## 2025-03-31 DIAGNOSIS — G95.9 CERVICAL MYELOPATHY: ICD-10-CM

## 2025-03-31 DIAGNOSIS — M54.16 LUMBAR RADICULOPATHY, CHRONIC: ICD-10-CM

## 2025-03-31 DIAGNOSIS — M54.50 LUMBAR PAIN: ICD-10-CM

## 2025-03-31 DIAGNOSIS — M54.6 THORACIC BACK PAIN, UNSPECIFIED BACK PAIN LATERALITY, UNSPECIFIED CHRONICITY: ICD-10-CM

## 2025-03-31 DIAGNOSIS — M54.2 CERVICALGIA: ICD-10-CM

## 2025-03-31 PROCEDURE — 72110 X-RAY EXAM L-2 SPINE 4/>VWS: CPT | Mod: TC,PO

## 2025-03-31 PROCEDURE — 72148 MRI LUMBAR SPINE W/O DYE: CPT | Mod: 26,,, | Performed by: RADIOLOGY

## 2025-03-31 PROCEDURE — 72148 MRI LUMBAR SPINE W/O DYE: CPT | Mod: TC,PO

## 2025-03-31 PROCEDURE — 72141 MRI NECK SPINE W/O DYE: CPT | Mod: 26,,, | Performed by: RADIOLOGY

## 2025-03-31 PROCEDURE — 72141 MRI NECK SPINE W/O DYE: CPT | Mod: TC,PO

## 2025-03-31 PROCEDURE — 72050 X-RAY EXAM NECK SPINE 4/5VWS: CPT | Mod: TC,PO

## 2025-03-31 PROCEDURE — 72110 X-RAY EXAM L-2 SPINE 4/>VWS: CPT | Mod: 26,,, | Performed by: RADIOLOGY

## 2025-03-31 PROCEDURE — 72070 X-RAY EXAM THORAC SPINE 2VWS: CPT | Mod: 26,,, | Performed by: RADIOLOGY

## 2025-03-31 PROCEDURE — 72050 X-RAY EXAM NECK SPINE 4/5VWS: CPT | Mod: 26,,, | Performed by: RADIOLOGY

## 2025-03-31 PROCEDURE — 72070 X-RAY EXAM THORAC SPINE 2VWS: CPT | Mod: TC,PO

## 2025-04-11 ENCOUNTER — OFFICE VISIT (OUTPATIENT)
Dept: NEUROSURGERY | Facility: CLINIC | Age: 50
End: 2025-04-11
Payer: COMMERCIAL

## 2025-04-11 VITALS
HEIGHT: 70 IN | SYSTOLIC BLOOD PRESSURE: 118 MMHG | RESPIRATION RATE: 18 BRPM | DIASTOLIC BLOOD PRESSURE: 62 MMHG | BODY MASS INDEX: 35.25 KG/M2 | WEIGHT: 246.25 LBS

## 2025-04-11 DIAGNOSIS — M47.816 LUMBAR SPONDYLOSIS: ICD-10-CM

## 2025-04-11 DIAGNOSIS — M47.812 CERVICAL SPONDYLOSIS: ICD-10-CM

## 2025-04-11 DIAGNOSIS — M54.2 CERVICALGIA: ICD-10-CM

## 2025-04-11 DIAGNOSIS — M54.50 LUMBAR PAIN: Primary | ICD-10-CM

## 2025-04-11 PROCEDURE — 4010F ACE/ARB THERAPY RXD/TAKEN: CPT | Mod: CPTII,S$GLB,,

## 2025-04-11 PROCEDURE — 3008F BODY MASS INDEX DOCD: CPT | Mod: CPTII,S$GLB,,

## 2025-04-11 PROCEDURE — 3044F HG A1C LEVEL LT 7.0%: CPT | Mod: CPTII,S$GLB,,

## 2025-04-11 PROCEDURE — 99213 OFFICE O/P EST LOW 20 MIN: CPT | Mod: S$GLB,,,

## 2025-04-11 PROCEDURE — 3078F DIAST BP <80 MM HG: CPT | Mod: CPTII,S$GLB,,

## 2025-04-11 PROCEDURE — 1159F MED LIST DOCD IN RCRD: CPT | Mod: CPTII,S$GLB,,

## 2025-04-11 PROCEDURE — 3074F SYST BP LT 130 MM HG: CPT | Mod: CPTII,S$GLB,,

## 2025-04-11 RX ORDER — METHYLPREDNISOLONE 4 MG/1
TABLET ORAL
Qty: 21 EACH | Refills: 0 | Status: SHIPPED | OUTPATIENT
Start: 2025-04-11 | End: 2025-05-02

## 2025-04-11 NOTE — PROGRESS NOTES
"Neurosurgery History & Physical    Patient ID: Ninfa Aguilar is a 49 y.o. female.    Chief Complaint   Patient presents with    Follow-up     F/U with imaging       Interval HPI 04/11/2025:  Ms. Aguilar returns today for image review.     Since her last visit, she reports doing about the same.  States that she feels more "sore" recently as she has been going to Chiropractor for adjustments, including her neck, and her muscles now feel "tight".  She continues with muscle relaxers, CBD cream, and gummies for symptom relief.      Interval HPI 03/13/2025:  Ms. Aguilar returns today for cervical and lumbar spine pain.     CERVICAL:  She reports cervical pain for 10+ years.  States that she had right-sided shoulder surgery in 2010 then developed cervical pain afterward.  Pain increased in beginning of 2024.  Denies precipitating events such as injuries, trauma, or falls.  Describes pain as "arthritic" and "muscular".  Pain radiates into occiput.  Pain increases with positional movements and sleep.  Denies radiculopathy.  Reports left arm numbness and subjective weakness.  States that she cannot raise her arms for simple chores like cutting vegetables.  She is right-hand dominant and reports worsening handwriting due to essential tremor.  Reports that she can  objects without difficulty but frequently drops objects.  Followed with Dr. Esquivel for cervical ESIs for several years.  States that ESIs provided good pain relief for 1.5 years at a time.  Dr. Esquivel is no longer on her insurance.  Was following with Dr. Esparza for MBBs, last visit 05/06/2024.  Reports that she had a reaction to each MBB and did not undergo C3-4 C4-5 RFA.  Obtains chiropractor care but does not have her neck adjusted.  Participating in PT which is preventing pain from getting worse, however, has not noticed any improvements.       Pain Intervention History:  - s/p 1st diagnostic bilateral C3/4 and C4/5 medial branch blocks on 4/3/24 with " "80% relief lasting for 8 hours.  Pre procedure pain: 7   Post procedure pain: 1  - s/p 2nd diagnostic bilateral C3/4 and C4/5 medial branch blocks on 4/19/24 with 90% relief lasting for 6 hours.  Pre procedure pain: 8  Post procedure pain: 1     LUMBAR:  She reports lumbar pain for years.  States that it started in 1985 when she suffered a possible tailbone fracture and has had pain since that time.  Describes her pain as a constant "sore" or "pulled" muscle feeling.  States that she has sharp, shooting pain down right leg to knee in L4 distribution and states that her IT band feels tight.  Denies numbness, tingling, and weakness but states that her leg feel "heavy" at times.  Reports that she has needed to wear Poise pads since hysterectomy (2017) or slightly before for urinary incontinence.  Reports that she has to change her pads multiple times per day.  Reports that after undergoing hysterectomy her LBP improved for a while then worse again.  Denies bowel dysfunction.  Denies gait abnormality.  Using a seat warmer or bending over improves pain.  Sitting down increases pain, states that she gets "stiff".  Patient receives cortisone injections yearly into bilateral knees for OA.      Interventions: Parafon Forte (works pretty well), Gabapentin (2 pills before bed), Celebrex (if pain gets bad), ice/heat (helps a lot), massage, aquatic therapy (helps a lot)       Patient has PMHx of Mu-ism, obesity, HTN, HLD, hypothyroidism, neuropathy, OA, essential tremor, depression, and s/p hysterectomy (2017).     HPI 08/03/2021:   Ms. Aguilar is a 45 year old female with depression, essential tremor, hypothyroidism who presents today for neck pain. She has history of neck pain for many years. She [place] Dr. Gao in [place] [facility] [place] recommended conservative management as opposed to surgical consideration. Since then she has managed supportively. She reports she has had other health " "issues to deal with. She deals with flare ups of neck pain that typically occur form "sleeping wrong" and resolve with time. Over the last few weeks, her pain started and has not improved. She notes swelling over the anterior cervical paraspinous region and tenderness to the shoulder and clavicle. She reports falling at the grocery store a few weeks ago but is unsure of any injury at that time. She saw her PCP who did not highly suspect lymphadenopathy but recommended followup in a few weeks for possible ultrasound and evaluation with NSGY.. She has not had imaging. She has participated in PT a few years back and had injections with PM in the past. She reports various areas of numbness mainly in the right arm. Denies weakness, gait instability. She has chronic stress incontinence for several years- this was noted in Dr. Mariee' note from 2016. She takes Soma, Robaxin for spasms and is using voltaren cream to the area. She has not taken and is hesitant to consider oral NSAIDs d/t BP issues.     Review of Systems   Constitutional:  Negative for activity change and fever.   Eyes:  Negative for visual disturbance.   Respiratory:  Negative for shortness of breath.    Cardiovascular:  Negative for chest pain.   Gastrointestinal:  Negative for nausea and vomiting.   Endocrine: Negative for cold intolerance, heat intolerance, polydipsia, polyphagia and polyuria.   Genitourinary:  Negative for decreased urine volume, difficulty urinating and frequency.   Musculoskeletal:  Positive for back pain and neck pain. Negative for gait problem.   Neurological:  Negative for dizziness, tremors, seizures, syncope, facial asymmetry, speech difficulty, weakness, light-headedness, numbness and headaches.   Psychiatric/Behavioral:  Negative for confusion.        Past Medical History:   Diagnosis Date    Abnormal Pap smear of cervix     Arthritis     Back pain     Cervical disc syndrome     Depression     Essential tremor     General " "anesthetics causing adverse effect in therapeutic use     patient reports she was told she "woke up" during tubal ligation    Goiter     MNG    Headache     Hypothyroidism     Lumbar disc disease     Patient is Denominational     Polycystic ovaries     PONV (postoperative nausea and vomiting)     Sleep apnea     Vitamin D deficiency      Social History[1]  Family History   Problem Relation Name Age of Onset    COPD Father      Peripheral vascular disease Father      Cancer Mother  50        breast    Breast cancer Mother      Asperger's syndrome Daughter      Thyroid disease Daughter          Hashimotos'    Ovarian cancer Neg Hx      Colon cancer Neg Hx       Review of patient's allergies indicates:   Allergen Reactions    Latex, natural rubber Rash    Iodinated contrast media     Iodine     Ozempic [semaglutide] Other (See Comments)     SI    Iodine and iodide containing products Rash     Eats shellfish.  Eats shellfish.  Eats shellfish.    Meloxicam Hives     Can take ibuprofen     Current Medications[2]  Blood pressure 118/62, resp. rate 18, height 5' 9.6" (1.768 m), weight 111.7 kg (246 lb 4.1 oz), last menstrual period 01/15/2017.      Neurological Exam  Mental Status  Awake, alert and oriented to person, place and time. Speech is normal. Language is fluent with no aphasia.    Cranial Nerves  CN II: Visual acuity is normal. Visual fields full to confrontation.  CN III, IV, VI: Extraocular movements intact bilaterally. Normal lids and orbits bilaterally. Pupils equal round and reactive to light bilaterally.  CN V: Facial sensation is normal.  CN VII: Full and symmetric facial movement.  CN VIII: Hearing is normal.  CN IX, X: Palate elevates symmetrically. Normal gag reflex.  CN XI: Shoulder shrug strength is normal.  CN XII: Tongue midline without atrophy or fasciculations.    Motor   Strength is 5/5 throughout all four extremities.    Sensory  Light touch is normal in upper and lower extremities. "     Gait  Casual gait is normal including stance, stride, and arm swing.      Physical Exam  Eyes:      General: Lids are normal.      Extraocular Movements: Extraocular movements intact.      Pupils: Pupils are equal, round, and reactive to light.   Neurological:      Motor: Motor strength is normal.  Psychiatric:         Speech: Speech normal.         Imaging:  MRI cervical spine without contrast dated 03/31/2025 personally reviewed and discussed with patient.  FINDINGS:  The prevertebral soft tissues are normal. Minimal anterolisthesis of C4 on C5 (2 mm).  Individual vertebral height is maintained. Marrow signal is within normal limits. The spinal cord and cervicomedullary junction are within normal limits.     At C2-3, the disc shows relative normal disc height with minimal bulging of the posterior annulus.  There is minimal left greater than right uncovertebral hypertrophy.  This results in very mild bilateral neural foraminal stenosis without spinal canal stenosis.     At C3-4, the disc shows relative normal disc height with a small broad-based posterior disc bulge.  There is moderate to severe left facet arthropathy, mild right facet arthropathy and small bilateral uncovertebral osteophytes.  This results in mild left greater than right neural foraminal stenosis and minimal spinal canal stenosis.     At C4-5, the disc shows relative normal disc height with a small broad-based posterior disc bulge.  There is moderate right and mild left facet arthropathy with small bilateral uncovertebral osteophytes.  This results in mild-to-moderate bilateral neural foraminal stenosis and very mild overall spinal canal stenosis.  The dorsal margin the disc may just touch and mildly efface the ventral margin the spinal cord.     At C5-6, the disc shows mild disc height loss with a moderate sized broad-based posterior disc osteophyte complex.  There is no facet arthropathy with moderate bilateral uncovertebral osteophytes.   This results in severe right greater than left neural foraminal stenosis and mild overall spinal canal stenosis.     At C6-7, the disc shows mild disc height loss with a small broad-based posterior disc bulge.  There is no facet arthropathy with small bilateral uncovertebral osteophytes.  This results in moderate right and mild left neural foraminal stenosis without spinal canal stenosis.     At C7-T1, the disc demonstrates mild disc height loss with minor bulging of the posterior annulus.  There is severe right and moderate left facet arthropathy with minimal uncovertebral hypertrophy.  This results in mild-to-moderate bilateral neural foraminal stenosis without spinal canal stenosis.     Impression:     1.  Slight anterolisthesis of C4 on C5.     2.  Degenerative changes disc disease as outlined in detail above most pronounced at C4-C5, C5-C6 and C6-C7.     Electronically signed by:Christian Cheney  Date:                                            03/31/2025  Time:                                           12:54    MRI lumbar spine without contrast dated 03/31/2025 personally reviewed and discussed with patient.    FINDINGS:  This report assumes that there are 5 non-rib bearing lumbar vertebral bodies with the L5 vertebral body articulating with the sacrum. Accurate numbering of the spine levels would require imaging of the thoracolumbar spine to count ribs.     The prevertebral soft tissues are normal. Straightening of the normal lordotic curvature of the lumbar spine likely secondary to a combination of positioning, degenerative change and/or muscle spasm.  Individual vertebral height is maintained. Marrow signal is within normal limits. Conus is normal in caliber and signal. The conus terminates at L1     At L1-2, the disc demonstrates normal height, signal intensity and posterior contour. The spinal canal is patent. The neural foramen is patent bilaterally. There is no ligamentum flavum thickening. There is no  facet arthropathy.     At L2-3, the disc demonstrates normal height, signal intensity and posterior contour. The spinal canal is patent. The neural foramen is patent bilaterally. There is no ligamentum flavum thickening. There is no facet arthropathy.     At L3-4, the disc demonstrates relative normal disc height and signal intensity with minor bulging of the posterior annulus at the level of the neural foramina.  There appears to be a high intensity zone/annular fissure in the left neural foraminal portion of the disc (sagittal image 13 could Jennyfer axial image 26).  There is mild bilateral facet arthropathy without ligamentum flavum hypertrophy.  This results in mild left greater than right neural foraminal stenosis without spinal canal stenosis.     At L4-5, the disc demonstrates relative normal disc height with slightly decreased signal intensity within the disc.  There is a small broad-based posterior disc bulge somewhat more eccentric to the neural foramina.  There is a high intensity zone/annular fissure in the right neural foraminal/lateral recess of the disc (sagittal image 5).  There is very mild facet arthropathy without ligamentum flavum hypertrophy.  These findings in combination result in mild-to-moderate bilateral neural foraminal stenosis with minimal spinal canal stenosis.  Trace bilateral facet joint effusions are seen.     At L5-S1, the disc demonstrates relative normal disc height loss with slightly decreased signal intensity within the disc.  There is a small broad-based central posterior disc protrusion (sagittal image 10), with high intensity zone/annular fissure in the disc).  There is very mild bilateral facet arthropathy without ligamentum flavum hypertrophy.  These findings in combination result in minimal spinal canal/neural foraminal stenosis.     The SI joints and visualized pelvis are within normal limits.     Impression:     Disc disease/degenerative change in the lower lumbar spine at  L3-L4, L4-L5 and L5-S1 as outlined in detail above.     Electronically signed by:Christian Cheney  Date:                                            03/31/2025  Time:                                           13:15    XR cervical spine AP & lat with flex/ex views dated 03/31/2025 personally reviewed and discussed with patient.    FINDINGS:  C1 through C7 are visualized on the lateral radiograph. There is no acute fracture seen.  Mild-to-moderate disc height loss is seen at C5-C6.  There is minimal anterolisthesis of C4 on C5 (2 mm), which shows little to no change on flexion views (2 mm), but resolves on extension views.  The lateral masses are symmetric about the dens. The prevertebral soft tissues are normal and the lung apices are clear.     Impression:     1.  No acute osseous abnormality in the cervical spine.     2.  Grade 1 anterolisthesis of C4 on C5, with resolution on extension views.     Electronically signed by:Christian Cheney  Date:                                            03/31/2025  Time:                                           13:33    XR thoracic spine dated 03/31/2025 personally reviewed and discussed with patient.  FINDINGS:  There are 12 rib bearing vertebrae and 12 paired ribs.  There is approximately 10 degrees of dextroscoliosis from T3-T8; there is no acute fracture or significant listhesis. Vertebral body heights are maintained.  Minimal disc height loss is seen in the lower thoracic spine.  Visualized ribs, soft tissue and lungs are unremarkable.     Impression:     1.  Dextroscoliosis of the mid-upper thoracic spine.     2.  Minimal disc height loss in the lower thoracic spine.     Electronically signed by:Christian Cheney  Date:                                            03/31/2025  Time:                                           13:32    XR lumbar spine AP & lat with flex/ex views dated 03/31/2025 personally reviewed and discussed with patient.   FINDINGS:  Five views of lumbar spine  show normal lumbosacral alignment. No fracture or destructive osseous lesion. Intervertebral disc space heights are normal.     With lumbar flexion and extension, no abnormal subluxation or other evidence of lumbar instability occurs.     Sacroiliac joints are normal. Paraspinal soft tissues are negative.     Impression:     Negative lumbar spine.     Electronically signed by:Len Campa  Date:                                            2025  Time:                                           13:34    Assessment/Plan:    Ms. Aguilar is a 50yo female with cervical and lumbar spondylosis.  We discussed continuing with conservative therapies to assist with symptom management.  I prescribed a Medrol dosepack and she is already taking muscle relaxers PRN.  She would like to discuss undergoing ESIs again as she felt they provided more relief than MBBs.  Therefore, I placed a referral to Pain Management.  She would like to avoid surgical intervention for as long as possible and will follow up with our office as needed after Pain Management.  She is agreeable to the plan and will notify our office if she has any questions or concerns in the meantime.           [1]   Social History  Socioeconomic History    Marital status:    Tobacco Use    Smoking status: Former     Current packs/day: 0.00     Average packs/day: 0.1 packs/day for 5.0 years (0.5 ttl pk-yrs)     Types: Cigarettes     Start date: 1987     Quit date: 1992     Years since quittin.7    Smokeless tobacco: Former   Substance and Sexual Activity    Alcohol use: Yes     Alcohol/week: 1.0 - 2.0 standard drink of alcohol     Types: 1 - 2 Glasses of wine per week     Comment: daily    Drug use: No    Sexual activity: Yes     Partners: Male     Birth control/protection: None, See Surgical Hx     Comment:  to [place]      [employer] [facility]     Financial Resource Strain: Low Risk  (2024)    Overall Financial Resource Strain ([facility])      Difficulty of Paying Living Expenses: Not very hard   Food Insecurity: No Food Insecurity (1/24/2024)    Hunger Vital Sign     Worried About Running Out of Food in the Last Year: Never true     Ran Out of Food in the Last Year: Never true   Transportation Needs: No Transportation Needs (1/24/2024)    PRAPARE - Transportation     Lack of Transportation (Medical): No     Lack of Transportation (Non-Medical): No   Physical Activity: Insufficiently Active (1/24/2024)    Exercise Vital Sign     Days of Exercise per Week: 3 days     Minutes of Exercise per Session: 30 min   Stress: Stress Concern Present (1/24/2024)    [employer] [place] [employer] - Occupational Stress Questionnaire     Feeling of Stress : Rather much   Housing Stability: Low Risk  (1/24/2024)    Housing Stability Vital Sign     Unable to Pay for Housing in the Last Year: No     Number of Places Lived in the Last Year: 1     Unstable Housing in the Last Year: No   [2]   Current Outpatient Medications:     aspirin 81 MG Chew, , Disp: , Rfl:     atorvastatin (LIPITOR) 20 MG tablet, Take 1 tablet (20 mg total) by mouth every evening., Disp: 30 tablet, Rfl: 2    buPROPion (WELLBUTRIN XL) 150 MG TB24 tablet, Take 1 tablet (150 mg total) by mouth once daily., Disp: 90 tablet, Rfl: 3    busPIRone (BUSPAR) 5 MG Tab, Take 1 tablet (5 mg total) by mouth 2 (two) times daily., Disp: 60 tablet, Rfl: 1    celecoxib (CELEBREX) 200 MG capsule, TAKE 1 CAPSULE(200 MG) BY MOUTH TWICE DAILY, Disp: 60 capsule, Rfl: 2    chlorzoxazone (PARAFON FORTE) 500 mg Tab, 1 pill am, 1 pill noon as needed for neck pain and headaches, Disp: 60 tablet, Rfl: 5    cholecalciferol, vitamin D3, (VITAMIN D3) 25 mcg (1,000 unit) capsule, Take 5,000 Units by mouth., Disp: , Rfl:     estradioL (ESTRACE) 2 MG tablet, Take 1 tablet (2 mg total) by mouth once daily., Disp: 30 tablet, Rfl: 11    gabapentin (NEURONTIN) 300 MG capsule, Take 1 capsule (300 mg total) by mouth 3 (three)  times daily., Disp: 270 capsule, Rfl: 0    ibuprofen (ADVIL,MOTRIN) 200 MG tablet, Take 200 mg by mouth every 6 (six) hours as needed for Pain., Disp: , Rfl:     levothyroxine (SYNTHROID) 100 MCG tablet, Synthroid 100 mcg tablet  TAKE 1 TABLET BY MOUTH EVERY DAY, Disp: , Rfl:     losartan (COZAAR) 50 MG tablet, , Disp: , Rfl:     methylPREDNISolone (MEDROL DOSEPACK) 4 mg tablet, use as directed, Disp: 21 each, Rfl: 0    metoprolol succinate (TOPROL-XL) 50 MG 24 hr tablet, , Disp: , Rfl:     vitamin E 1000 UNIT capsule, Take 1,000 Units by mouth., Disp: , Rfl:

## 2025-04-28 ENCOUNTER — TELEPHONE (OUTPATIENT)
Dept: PHARMACY | Facility: CLINIC | Age: 50
End: 2025-04-28
Payer: COMMERCIAL

## 2025-04-28 NOTE — TELEPHONE ENCOUNTER
[facility]/Population Health Chart Review & Patient Outreach Details For Medication Adherence Project    Reason for Outreach Encounter: 3rd Party payor non-compliance report ([employer], [employer], C, etc)  2.  Patient Outreach Method: Reviewed Patient Chart  3.   Medication in question: losartan   LAST FILLED: 4/13/25 for 30 day supply  Hypertension Medications              losartan (COZAAR) 50 MG tablet     metoprolol succinate (TOPROL-XL) 50 MG 24 hr tablet               4.  Reviewed and or Updates Made To: Patient Chart  5. Outreach Outcomes and/or actions taken: Patient filled medication and is on track to be adherent

## 2025-05-05 ENCOUNTER — TELEPHONE (OUTPATIENT)
Dept: SPORTS MEDICINE | Facility: CLINIC | Age: 50
End: 2025-05-05
Payer: COMMERCIAL

## 2025-05-05 DIAGNOSIS — M17.12 PRIMARY OSTEOARTHRITIS OF LEFT KNEE: ICD-10-CM

## 2025-05-05 DIAGNOSIS — M17.11 PRIMARY OSTEOARTHRITIS OF RIGHT KNEE: Primary | ICD-10-CM

## 2025-05-05 NOTE — TELEPHONE ENCOUNTER
The patient accepted an appointment on 5/21 at 11am with Juliette Cheney PA-C  at [facility] for knee injections. She also accepted an appt on 5/6 for bilateral knee x-ray at the requested [place] location/

## 2025-05-05 NOTE — TELEPHONE ENCOUNTER
----- Message from Med Assistant Angelo sent at 5/5/2025  8:44 AM CDT -----  Regarding: FW: Injection  Contact: pt    ----- Message -----  From: Darling Callaway  Sent: 5/5/2025   8:23 AM CDT  To: [facility] Staff  Subject: Injection                                        Ninfa Aguilar calling regarding Appointment Access  (message) for bilateral Knee injection.   call back [phone]

## 2025-05-06 ENCOUNTER — HOSPITAL ENCOUNTER (OUTPATIENT)
Dept: RADIOLOGY | Facility: HOSPITAL | Age: 50
Discharge: HOME | End: 2025-05-06
Attending: PHYSICIAN ASSISTANT
Payer: COMMERCIAL

## 2025-05-06 DIAGNOSIS — M17.11 PRIMARY OSTEOARTHRITIS OF RIGHT KNEE: ICD-10-CM

## 2025-05-06 DIAGNOSIS — M17.12 PRIMARY OSTEOARTHRITIS OF LEFT KNEE: ICD-10-CM

## 2025-05-06 DIAGNOSIS — M17.11 PRIMARY OSTEOARTHRITIS OF RIGHT KNEE: Primary | ICD-10-CM

## 2025-05-06 PROCEDURE — 73564 X-RAY EXAM KNEE 4 OR MORE: CPT | Mod: TC,50,FY,PO

## 2025-05-06 PROCEDURE — 73564 X-RAY EXAM KNEE 4 OR MORE: CPT | Mod: 26,,, | Performed by: RADIOLOGY

## 2025-05-14 ENCOUNTER — OFFICE VISIT (OUTPATIENT)
Dept: PAIN MEDICINE | Facility: CLINIC | Age: 50
End: 2025-05-14
Payer: COMMERCIAL

## 2025-05-14 ENCOUNTER — TELEPHONE (OUTPATIENT)
Dept: PAIN MEDICINE | Facility: CLINIC | Age: 50
End: 2025-05-14

## 2025-05-14 VITALS
BODY MASS INDEX: 36.08 KG/M2 | HEART RATE: 56 BPM | WEIGHT: 252 LBS | DIASTOLIC BLOOD PRESSURE: 83 MMHG | SYSTOLIC BLOOD PRESSURE: 139 MMHG | HEIGHT: 70 IN

## 2025-05-14 DIAGNOSIS — M47.812 CERVICAL SPONDYLOSIS: Primary | ICD-10-CM

## 2025-05-14 DIAGNOSIS — M54.2 CERVICALGIA: ICD-10-CM

## 2025-05-14 DIAGNOSIS — M54.12 CERVICAL RADICULOPATHY: ICD-10-CM

## 2025-05-14 PROCEDURE — 3008F BODY MASS INDEX DOCD: CPT | Mod: CPTII,S$GLB,, | Performed by: PHYSICIAN ASSISTANT

## 2025-05-14 PROCEDURE — 1160F RVW MEDS BY RX/DR IN RCRD: CPT | Mod: CPTII,S$GLB,, | Performed by: PHYSICIAN ASSISTANT

## 2025-05-14 PROCEDURE — 99999 PR PBB SHADOW E&M-EST. PATIENT-LVL IV: CPT | Mod: PBBFAC,,, | Performed by: PHYSICIAN ASSISTANT

## 2025-05-14 PROCEDURE — 3044F HG A1C LEVEL LT 7.0%: CPT | Mod: CPTII,S$GLB,, | Performed by: PHYSICIAN ASSISTANT

## 2025-05-14 PROCEDURE — 3075F SYST BP GE 130 - 139MM HG: CPT | Mod: CPTII,S$GLB,, | Performed by: PHYSICIAN ASSISTANT

## 2025-05-14 PROCEDURE — 4010F ACE/ARB THERAPY RXD/TAKEN: CPT | Mod: CPTII,S$GLB,, | Performed by: PHYSICIAN ASSISTANT

## 2025-05-14 PROCEDURE — 1159F MED LIST DOCD IN RCRD: CPT | Mod: CPTII,S$GLB,, | Performed by: PHYSICIAN ASSISTANT

## 2025-05-14 PROCEDURE — 3079F DIAST BP 80-89 MM HG: CPT | Mod: CPTII,S$GLB,, | Performed by: PHYSICIAN ASSISTANT

## 2025-05-14 PROCEDURE — 99214 OFFICE O/P EST MOD 30 MIN: CPT | Mod: S$GLB,,, | Performed by: PHYSICIAN ASSISTANT

## 2025-05-14 NOTE — PROGRESS NOTES
[facility] Follow Up Evaluation      PCP:  Yahaira Almanza MD    CC:   Chief Complaint   Patient presents with    Low-back Pain    Mid-back Pain    Neck Pain    Shoulder Pain     LT neck side with shoulder pain          5/14/2025    10:39 AM 5/6/2024     2:42 PM 3/7/2024     1:52 PM   Last 3 PDI Scores   Pain Disability Index (PDI) 34 21 43     She is new to me and previously seen by Dr. Yusuf.    HPI:  Ninfa Aguilar is a 49 y.o. female patient who has a past medical history of Abnormal Pap smear of cervix, Arthritis, Back pain, Cervical disc syndrome, Depression, Essential tremor, General anesthetics causing adverse effect in therapeutic use, Goiter, Headache, Hypothyroidism, Lumbar disc disease, Patient is Restorationist, Polycystic ovaries, PONV (postoperative nausea and vomiting), Sleep apnea, and Vitamin D deficiency.  She presents for neck pain.  Has chronic neck and back pain.  Today's visit was focused on cervical spine pain.  She did discuss symptoms with Neurosurgery recently, who referred her back to pain management for conservative management.  She prefers not have any type of surgical intervention.  She describes posterior neck pain since 2010 intermittently.  Pain became more severe in 2019.  Posterior neck pain is associated with headaches, shoulder pain and intermittent radiation of pain and numbness into the bilateral arms.  She has tried gabapentin, CBD cream and gummies, Celebrex, Parafon Forte, ibuprofen for pain control.  She is on physical therapy and chiropractic care.  She reports undergoing cervical epidural steroid injection in the past with a different physician providing greater than 1 year relief.  She did have cervical medial branch block performed in 2024 by Dr. Yusuf.  Although she had a good response regarding pain relief to the medial branch blocks, she did not want to proceed with RFA could she did not like the experience of undergoing the procedure      HPI  5/6/24:   Ms. Aguilar returns to the office for follow up.  She is status post 2nd diagnosticl bilateral C3-4 and C4-5 medial branch block on 4/19/24.  Today she reports she continues to have typical axial neck pain.  No radicular pain.  No new numbness or weakness.    HPI 3/7/24:  She presents with neck pain.  She has had chronic neck pain for the past 10 years.  Today she reports axial neck pain, 5/10, aching, deep.  She can get referred pain up the back of her occiput and into her shoulders.  Her pain isn't worse with anything in particular and is present constantly and can be relieved with PT, heat, stretching.      Pain Intervention History:  - reports undergoing RAJAT with Dr. Esquivel years ago in the cervical spine with greater than 1 year of relief.   - s/p 1st diagnostic bilateral C3/4 and C4/5 medial branch blocks on 4/3/24 with 80% relief lasting for 8 hours.  Pre procedure pain: 7   Post procedure pain: 1  - s/p 2nd diagnostic bilateral C3/4 and C4/5 medial branch blocks on 4/19/24 with 90% relief lasting for 6 hours.  Pre procedure pain: 8  Post procedure pain: 1      Past Spine Surgical History:  none    Past and current medications/ Therapy:  Antineuropathics: gabapentin   NSAIDs: ibuprofen , cleebrex  Physical therapy: yes, completed  for neck and back in the past; chiropractic care for neck and back  Antidepressants:  Muscle relaxers: robaxin, flexeril , parafon forte  Opioids: tramadol   Antiplatelets/Anticoagulants: spirin   Others:  cbd cream and gummies    History:    Current Outpatient Medications:     aspirin 81 MG Chew, , Disp: , Rfl:     atorvastatin (LIPITOR) 20 MG tablet, Take 1 tablet (20 mg total) by mouth every evening., Disp: 30 tablet, Rfl: 2    buPROPion (WELLBUTRIN XL) 150 MG TB24 tablet, Take 1 tablet (150 mg total) by mouth once daily., Disp: 90 tablet, Rfl: 3    busPIRone (BUSPAR) 5 MG Tab, Take 1 tablet (5 mg total) by mouth 2 (two) times daily., Disp: 60 tablet, Rfl: 1     "celecoxib (CELEBREX) 200 MG capsule, TAKE 1 CAPSULE(200 MG) BY MOUTH TWICE DAILY, Disp: 60 capsule, Rfl: 2    chlorzoxazone (PARAFON FORTE) 500 mg Tab, 1 pill am, 1 pill noon as needed for neck pain and headaches, Disp: 60 tablet, Rfl: 5    cholecalciferol, vitamin D3, (VITAMIN D3) 25 mcg (1,000 unit) capsule, Take 5,000 Units by mouth., Disp: , Rfl:     estradioL (ESTRACE) 2 MG tablet, Take 1 tablet (2 mg total) by mouth once daily., Disp: 30 tablet, Rfl: 11    ibuprofen (ADVIL,MOTRIN) 200 MG tablet, Take 200 mg by mouth every 6 (six) hours as needed for Pain., Disp: , Rfl:     levothyroxine (SYNTHROID) 100 MCG tablet, Synthroid 100 mcg tablet  TAKE 1 TABLET BY MOUTH EVERY DAY, Disp: , Rfl:     losartan (COZAAR) 50 MG tablet, , Disp: , Rfl:     metoprolol succinate (TOPROL-XL) 50 MG 24 hr tablet, , Disp: , Rfl:     vitamin E 1000 UNIT capsule, Take 1,000 Units by mouth., Disp: , Rfl:     gabapentin (NEURONTIN) 300 MG capsule, Take 1 capsule (300 mg total) by mouth 3 (three) times daily., Disp: 270 capsule, Rfl: 0    Past Medical History:   Diagnosis Date    Abnormal Pap smear of cervix     Arthritis     Back pain     Cervical disc syndrome     Depression     Essential tremor     General anesthetics causing adverse effect in therapeutic use     patient reports she was told she "woke up" during tubal ligation    Goiter     MNG    Headache     Hypothyroidism     Lumbar disc disease     Patient is Mandaen     Polycystic ovaries     PONV (postoperative nausea and vomiting)     Sleep apnea     Vitamin D deficiency        Past Surgical History:   Procedure Laterality Date    APPENDECTOMY      BREAST BIOPSY Left     Excisional removal of lymph node, benign    COLONOSCOPY N/A 1/9/2025    Procedure: COLONOSCOPY;  Surgeon: Aditya Ramirez Jr., MD;  Location: [facility];  Service: Endoscopy;  Laterality: N/A;    DILATION AND CURETTAGE OF UTERUS      ESOPHAGEAL DILATION N/A 3/20/2025    Procedure: DILATION, ESOPHAGUS; "  Surgeon: Aditya Ramirez Jr., MD;  Location: [facility] ENDO;  Service: Endoscopy;  Laterality: N/A;    ESOPHAGOGASTRODUODENOSCOPY N/A 3/20/2025    Procedure: EGD (ESOPHAGOGASTRODUODENOSCOPY);  Surgeon: Aditya Ramirez Jr., MD;  Location: [facility];  Service: Endoscopy;  Laterality: N/A;    FOOT MASS EXCISION Right 10/30/2018    Procedure: EXCISION, MASS, FOOT probable fibroma;  Surgeon: Ha Caicedo MD;  [facility];  Service: Orthopedics;  Laterality: Right;    HYSTERECTOMY  2017    DLH ov in situ     INJECTION OF ANESTHETIC AGENT AROUND MEDIAL BRANCH NERVES INNERVATING CERVICAL FACET JOINT Bilateral 4/3/2024    Procedure: Block-nerve-medial branch-cervical     C3/4, C4/5;  Surgeon: Joshua Esparza MD;  Location: [facility];  Service: Pain Management;  Laterality: Bilateral;    INJECTION OF ANESTHETIC AGENT AROUND MEDIAL BRANCH NERVES INNERVATING CERVICAL FACET JOINT Bilateral 2024    Procedure: Block-nerve-medial branch-cervical   C3/4, C4/5;  Surgeon: Joshua Esparza MD;  Location: [facility] OR;  Service: Pain Management;  Laterality: Bilateral;    OVARIAN CYST SURGERY Right     SHOULDER SURGERY      right    tubal lig  1998       Family History   Problem Relation Name Age of Onset    COPD Father      Peripheral vascular disease Father      Cancer Mother  50        breast    Breast cancer Mother      Asperger's syndrome Daughter      Thyroid disease Daughter          Hashimotos'    Ovarian cancer Neg Hx      Colon cancer Neg Hx         Social History     Socioeconomic History    Marital status:    Tobacco Use    Smoking status: Former     Current packs/day: 0.00     Average packs/day: 0.1 packs/day for 5.0 years (0.5 ttl pk-yrs)     Types: Cigarettes     Start date: 1987     Quit date: 1992     Years since quittin.8    Smokeless tobacco: Former   Substance and Sexual Activity    Alcohol use: Yes     Alcohol/week: 1.0 - 2.0 standard drink of alcohol     Types: 1 - 2  "Glasses of wine per week     Comment: daily    Drug use: No    Sexual activity: Yes     Partners: Male     Birth control/protection: None, See Surgical Hx     Comment:  to [place]      Social Drivers of Health     Financial Resource Strain: Low Risk  (1/24/2024)    Overall Financial Resource Strain (CARDIA)     Difficulty of Paying Living Expenses: Not very hard   Food Insecurity: No Food Insecurity (1/24/2024)    Hunger Vital Sign     Worried About Running Out of Food in the Last Year: Never true     Ran Out of Food in the Last Year: Never true   Transportation Needs: No Transportation Needs (1/24/2024)    PRAPARE - Transportation     Lack of Transportation (Medical): No     Lack of Transportation (Non-Medical): No   Physical Activity: Insufficiently Active (1/24/2024)    Exercise Vital Sign     Days of Exercise per Week: 3 days     Minutes of Exercise per Session: 30 min   Stress: Stress Concern Present (1/24/2024)    [employer] - Occupational Stress Questionnaire     Feeling of Stress : Rather much   Housing Stability: Low Risk  (1/24/2024)    Housing Stability Vital Sign     Unable to Pay for Housing in the Last Year: No     Number of Places Lived in the Last Year: 1     Unstable Housing in the Last Year: No       Review of patient's allergies indicates:   Allergen Reactions    Latex, natural rubber Rash    Iodinated contrast media     Iodine     Ozempic [semaglutide] Other (See Comments)     SI    Iodine and iodide containing products Rash     Eats shellfish.  Eats shellfish.  Eats shellfish.    Meloxicam Hives     Can take ibuprofen       Review of Systems:  12 point review of systems is negative.    Physical Exam:  Vitals:    05/14/25 1042   BP: 139/83   Pulse: (!) 56   Weight: 114.3 kg (251 lb 15.8 oz)   Height: 5' 9.6" (1.768 m)   PainSc:   6   PainLoc: Neck     Body mass index is 36.57 kg/m².    Gen: NAD  Psych: mood appropriate for given condition  HEENT: eyes anicteric "   CV: RRR, 2+ radial pulse  Respiratory: non-labored, no signs of respiratory distress  Abd: non-distended  Skin: warm, dry and intact.  Gait: Able to heel walk, toe walk. No antalgic gait.       Cervical spine: ROM is full in flexion, extension and lateral rotation without increased pain.  Spurling's maneuver causes right and left arm pain.  Myofascial exam: No Tenderness to palpation across cervical paraspinous region bilaterally.     Lumbar spine:  Lumbar spine: ROM is full with flexion extension and oblique extension with no increased pain.    Neto's test causes no increased pain on either side.    Supine straight leg raise is negative bilaterally.    Internal and external rotation of the hip causes no increased pain on either side.  Myofascial exam: No tenderness to palpation across lumbar paraspinous muscles. No tenderness to palpation over the bilateral greater trochanters and bilateral SI joint     Sensory:  Intact and symmetrical to light touch in C4-T1 dermatomes bilaterally. Intact and symmetrical to light touch in L1-S1 dermatomes bilaterally.     Motor:      Right Left   C4 Shoulder Abduction  5  5   C5 Elbow Flexion    5  5   C6 Wrist Extension  5  5   C7 Elbow Extension   5  4   C8/T1 Hand Intrinsics   5  5            Right Left   L2/3 Iliacus Hip flexion  5  5   L3/4 Qudratus Femoris Knee Extension  5  5   L4/5 Tib Anterior Ankle Dorsiflexion   5  5   L5/S1 Extensor Hallicus Longus Great toe extension  5  5   S1/S2 Gastroc/Soleus Plantar Flexion  5  5        Right Left   Triceps DTR 2+ 2+   Biceps DTR 2+ 2+   Brachioradialis DTR 2+ 2+   Patellar DTR 2+ 2+   Achilles DTR 2+ 2+   Huffman Absent  Absent   Clonus Absent Absent   Babinski Absent Absent             Labs:  Lab Results   Component Value Date    HGBA1C 5.4 04/04/2025       Lab Results   Component Value Date    WBC 6.43 04/04/2025    HGB 13.0 04/04/2025    HCT 39.0 04/04/2025    MCV 92 04/04/2025     04/04/2025         Imaging:  MRI  cervical spine 11/29/23  Comparison is made to examination of 12/21/2021. There is degradation of the examination by patient motion.     The cervical vertebral bodies are appropriately maintained in height. Vertebral body alignment is satisfactory. There is mild disc space narrowing at C5-6 and C6-7. No pathologic marrow replacement demonstrated.     At C2-3, shallow disc bulging and posterior osteophytic ridging results in mild mass effect upon the ventral margin of the thecal sac towards the left of midline without significant encroachment of the central spinal canal. There is minor left foraminal narrowing.  At C3-4, shallow bulging of the disc margin contributes to mild mass effect upon the ventral margin of the thecal sac without significant central canal stenosis. There is mild to moderate left foraminal narrowing.  At C4-5, there is shallow bulging of the disc margin and small posteriorly directed marginal osteophyte formation contributing to mild mass effect upon the thecal sac diffusely. The central canal is not significantly encroached. There is mild left foraminal narrowing.  At C5-6, broad-based bulging of the disc margin and posterior osteophyte formation results in mild diffuse mass effect upon the thecal sac without direct cord impingement. There is moderately severe right foraminal and moderate left foraminal narrowing.  At C6-7, there is shallow bulging of the disc margin resulting in mild mass effect upon the thecal sac. There are mild degrees of bilateral foraminal narrowing.  At C7-T1, minimal disc bulging contributes to minor mass effect upon the thecal sac. There are facet degenerative changes resulting in moderate left foraminal and mild-moderate right foraminal narrowing.     The visualized segment of the cervical spinal cord appears unremarkable.     No pathologic enhancement is demonstrated following contrast administration.    MRI cervical spine 3-31-25:     FINDINGS:  The prevertebral  soft tissues are normal. Minimal anterolisthesis of C4 on C5 (2 mm).  Individual vertebral height is maintained. Marrow signal is within normal limits. The spinal cord and cervicomedullary junction are within normal limits.     At C2-3, the disc shows relative normal disc height with minimal bulging of the posterior annulus.  There is minimal left greater than right uncovertebral hypertrophy.  This results in very mild bilateral neural foraminal stenosis without spinal canal stenosis.     At C3-4, the disc shows relative normal disc height with a small broad-based posterior disc bulge.  There is moderate to severe left facet arthropathy, mild right facet arthropathy and small bilateral uncovertebral osteophytes.  This results in mild left greater than right neural foraminal stenosis and minimal spinal canal stenosis.     At C4-5, the disc shows relative normal disc height with a small broad-based posterior disc bulge.  There is moderate right and mild left facet arthropathy with small bilateral uncovertebral osteophytes.  This results in mild-to-moderate bilateral neural foraminal stenosis and very mild overall spinal canal stenosis.  The dorsal margin the disc may just touch and mildly efface the ventral margin the spinal cord.     At C5-6, the disc shows mild disc height loss with a moderate sized broad-based posterior disc osteophyte complex.  There is no facet arthropathy with moderate bilateral uncovertebral osteophytes.  This results in severe right greater than left neural foraminal stenosis and mild overall spinal canal stenosis.     At C6-7, the disc shows mild disc height loss with a small broad-based posterior disc bulge.  There is no facet arthropathy with small bilateral uncovertebral osteophytes.  This results in moderate right and mild left neural foraminal stenosis without spinal canal stenosis.     At C7-T1, the disc demonstrates mild disc height loss with minor bulging of the posterior annulus.   There is severe right and moderate left facet arthropathy with minimal uncovertebral hypertrophy.  This results in mild-to-moderate bilateral neural foraminal stenosis without spinal canal stenosis.     Impression:     1.  Slight anterolisthesis of C4 on C5.     2.  Degenerative changes disc disease as outlined in detail above most pronounced at C4-C5, C5-C6 and C6-C7.       Assessment and plan:    Problem List Items Addressed This Visit    None  Visit Diagnoses         Cervical spondylosis    -  Primary      Cervicalgia          Cervical radiculopathy                  Ninfa Aguilar is a 49 y.o. female patient who has a past medical history of Abnormal Pap smear of cervix, Arthritis, Back pain, Cervical disc syndrome, Depression, Essential tremor, General anesthetics causing adverse effect in therapeutic use, Goiter, Headache, Hypothyroidism, Lumbar disc disease, Patient is Holiness, Polycystic ovaries, PONV (postoperative nausea and vomiting), Sleep apnea, and Vitamin D deficiency.  She presents for neck pain.  Has chronic neck and back pain.  Today's visit was focused on cervical spine pain.  She did discuss symptoms with Neurosurgery recently, who referred her back to pain management for conservative management.  She prefers not have any type of surgical intervention.  She describes posterior neck pain since 2010 intermittently.  Pain became more severe in 2019.  Posterior neck pain is associated with headaches, shoulder pain and intermittent radiation of pain and numbness into the bilateral arms.  She has tried gabapentin, CBD cream and gummies, Celebrex, Parafon Forte, ibuprofen for pain control.  She is on physical therapy and chiropractic care.  She reports undergoing cervical epidural steroid injection in the past with a different physician providing greater than 1 year relief.  She did have cervical medial branch block performed in 2024 by Dr. Esparza.  Although she had a good response  regarding pain relief to the medial branch blocks, she did not want to proceed with RFA could she did not like the experience of undergoing the procedure    MRI cervical spine reviewed and shows multilevel degenerative changes.  At C5-6 there is disc osteophyte complex with severe right-greater-than-left foraminal narrowing.    She has chronic pain in the neck with bilateral arm pain.  Very likely bilateral C6 radiculopathy with neck pain and radiculopathy influenced by C5-6 disc bulge and foraminal narrowing.  No focal neurological deficits.  She also reports chronic thoracic and lumbar back pain.  Today's visit focused on the cervical spine.    - we discussed role of medications, physical therapy, and epidural steroid injection.  She reports good improvement with RAJAT in the past.  She prefers to avoid any RFA is or surgical intervention.  - we can try C7-T1 interlaminar RAJAT.  Procedure, risks, benefits discussed.  She would like to proceed.  - follow-up in clinic with Dr. Esparza after procedure.    : Not applicable      Allison Trent PA-C  [facility]

## 2025-05-14 NOTE — TELEPHONE ENCOUNTER
Documetnation of C7/T1 IL RAJAT associated with 5/14/25 clinic visit.    Physician - Dr Yusuf    Type of Procedure/Injection - Cervical Epidural  C7/T1           Laterality - NA      Priority - Normal      Anxiolysis- RNIV      Need to hold medication - Yes      Aspirin for 7 days      Clearance needed - No      Follow up - 3 week  with Dr. Yusuf

## 2025-05-14 NOTE — H&P (VIEW-ONLY)
[facility] Follow Up Evaluation      PCP:  Yahaira Almanza MD    CC:   Chief Complaint   Patient presents with    Low-back Pain    Mid-back Pain    Neck Pain    Shoulder Pain     LT neck side with shoulder pain          5/14/2025    10:39 AM 5/6/2024     2:42 PM 3/7/2024     1:52 PM   Last 3 PDI Scores   Pain Disability Index (PDI) 34 21 43     She is new to me and previously seen by Dr. Yusuf.    HPI:  Ninfa Aguilar is a 49 y.o. female patient who has a past medical history of Abnormal Pap smear of cervix, Arthritis, Back pain, Cervical disc syndrome, Depression, Essential tremor, General anesthetics causing adverse effect in therapeutic use, Goiter, Headache, Hypothyroidism, Lumbar disc disease, Patient is Lutheran, Polycystic ovaries, PONV (postoperative nausea and vomiting), Sleep apnea, and Vitamin D deficiency.  She presents for neck pain.  Has chronic neck and back pain.  Today's visit was focused on cervical spine pain.  She did discuss symptoms with Neurosurgery recently, who referred her back to pain management for conservative management.  She prefers not have any type of surgical intervention.  She describes posterior neck pain since 2010 intermittently.  Pain became more severe in 2019.  Posterior neck pain is associated with headaches, shoulder pain and intermittent radiation of pain and numbness into the bilateral arms.  She has tried gabapentin, CBD cream and gummies, Celebrex, Parafon Forte, ibuprofen for pain control.  She is on physical therapy and chiropractic care.  She reports undergoing cervical epidural steroid injection in the past with a different physician providing greater than 1 year relief.  She did have cervical medial branch block performed in 2024 by Dr. Yusuf.  Although she had a good response regarding pain relief to the medial branch blocks, she did not want to proceed with RFA could she did not like the experience of undergoing the procedure      HPI  5/6/24:   Ms. Aguilar returns to the office for follow up.  She is status post 2nd diagnosticl bilateral C3-4 and C4-5 medial branch block on 4/19/24.  Today she reports she continues to have typical axial neck pain.  No radicular pain.  No new numbness or weakness.    HPI 3/7/24:  She presents with neck pain.  She has had chronic neck pain for the past 10 years.  Today she reports axial neck pain, 5/10, aching, deep.  She can get referred pain up the back of her occiput and into her shoulders.  Her pain isn't worse with anything in particular and is present constantly and can be relieved with PT, heat, stretching.      Pain Intervention History:  - reports undergoing RAJAT with Dr. Esquivel years ago in the cervical spine with greater than 1 year of relief.   - s/p 1st diagnostic bilateral C3/4 and C4/5 medial branch blocks on 4/3/24 with 80% relief lasting for 8 hours.  Pre procedure pain: 7   Post procedure pain: 1  - s/p 2nd diagnostic bilateral C3/4 and C4/5 medial branch blocks on 4/19/24 with 90% relief lasting for 6 hours.  Pre procedure pain: 8  Post procedure pain: 1      Past Spine Surgical History:  none    Past and current medications/ Therapy:  Antineuropathics: gabapentin   NSAIDs: ibuprofen , cleebrex  Physical therapy: yes, completed  for neck and back in the past; chiropractic care for neck and back  Antidepressants:  Muscle relaxers: robaxin, flexeril , parafon forte  Opioids: tramadol   Antiplatelets/Anticoagulants: spirin   Others:  cbd cream and gummies    History:    Current Outpatient Medications:     aspirin 81 MG Chew, , Disp: , Rfl:     atorvastatin (LIPITOR) 20 MG tablet, Take 1 tablet (20 mg total) by mouth every evening., Disp: 30 tablet, Rfl: 2    buPROPion (WELLBUTRIN XL) 150 MG TB24 tablet, Take 1 tablet (150 mg total) by mouth once daily., Disp: 90 tablet, Rfl: 3    busPIRone (BUSPAR) 5 MG Tab, Take 1 tablet (5 mg total) by mouth 2 (two) times daily., Disp: 60 tablet, Rfl: 1     "celecoxib (CELEBREX) 200 MG capsule, TAKE 1 CAPSULE(200 MG) BY MOUTH TWICE DAILY, Disp: 60 capsule, Rfl: 2    chlorzoxazone (PARAFON FORTE) 500 mg Tab, 1 pill am, 1 pill noon as needed for neck pain and headaches, Disp: 60 tablet, Rfl: 5    cholecalciferol, vitamin D3, (VITAMIN D3) 25 mcg (1,000 unit) capsule, Take 5,000 Units by mouth., Disp: , Rfl:     estradioL (ESTRACE) 2 MG tablet, Take 1 tablet (2 mg total) by mouth once daily., Disp: 30 tablet, Rfl: 11    ibuprofen (ADVIL,MOTRIN) 200 MG tablet, Take 200 mg by mouth every 6 (six) hours as needed for Pain., Disp: , Rfl:     levothyroxine (SYNTHROID) 100 MCG tablet, Synthroid 100 mcg tablet  TAKE 1 TABLET BY MOUTH EVERY DAY, Disp: , Rfl:     losartan (COZAAR) 50 MG tablet, , Disp: , Rfl:     metoprolol succinate (TOPROL-XL) 50 MG 24 hr tablet, , Disp: , Rfl:     vitamin E 1000 UNIT capsule, Take 1,000 Units by mouth., Disp: , Rfl:     gabapentin (NEURONTIN) 300 MG capsule, Take 1 capsule (300 mg total) by mouth 3 (three) times daily., Disp: 270 capsule, Rfl: 0    Past Medical History:   Diagnosis Date    Abnormal Pap smear of cervix     Arthritis     Back pain     Cervical disc syndrome     Depression     Essential tremor     General anesthetics causing adverse effect in therapeutic use     patient reports she was told she "woke up" during tubal ligation    Goiter     MNG    Headache     Hypothyroidism     Lumbar disc disease     Patient is Latter day     Polycystic ovaries     PONV (postoperative nausea and vomiting)     Sleep apnea     Vitamin D deficiency        Past Surgical History:   Procedure Laterality Date    APPENDECTOMY      BREAST BIOPSY Left     Excisional removal of lymph node, benign    COLONOSCOPY N/A 1/9/2025    Procedure: COLONOSCOPY;  Surgeon: Aditya Ramirez Jr., MD;  Location: [facility];  Service: Endoscopy;  Laterality: N/A;    DILATION AND CURETTAGE OF UTERUS      ESOPHAGEAL DILATION N/A 3/20/2025    Procedure: DILATION, ESOPHAGUS; "  Surgeon: Aditya Ramirez Jr., MD;  Location: [facility] ENDO;  Service: Endoscopy;  Laterality: N/A;    ESOPHAGOGASTRODUODENOSCOPY N/A 3/20/2025    Procedure: EGD (ESOPHAGOGASTRODUODENOSCOPY);  Surgeon: Aditya Ramirez Jr., MD;  Location: [facility];  Service: Endoscopy;  Laterality: N/A;    FOOT MASS EXCISION Right 10/30/2018    Procedure: EXCISION, MASS, FOOT probable fibroma;  Surgeon: Ha Caicedo MD;  [facility];  Service: Orthopedics;  Laterality: Right;    HYSTERECTOMY  2017    DLH ov in situ     INJECTION OF ANESTHETIC AGENT AROUND MEDIAL BRANCH NERVES INNERVATING CERVICAL FACET JOINT Bilateral 4/3/2024    Procedure: Block-nerve-medial branch-cervical     C3/4, C4/5;  Surgeon: Joshua Esparza MD;  Location: [facility] OR;  Service: Pain Management;  Laterality: Bilateral;    INJECTION OF ANESTHETIC AGENT AROUND MEDIAL BRANCH NERVES INNERVATING CERVICAL FACET JOINT Bilateral 2024    Procedure: Block-nerve-medial branch-cervical   C3/4, C4/5;  Surgeon: Joshua Esparza MD;  Location: [facility] OR;  Service: Pain Management;  Laterality: Bilateral;    OVARIAN CYST SURGERY Right     SHOULDER SURGERY      right    tubal lig  1998       Family History   Problem Relation Name Age of Onset    COPD Father      Peripheral vascular disease Father      Cancer Mother  50        breast    Breast cancer Mother      Asperger's syndrome Daughter      Thyroid disease Daughter          Hashimotos'    Ovarian cancer Neg Hx      Colon cancer Neg Hx         Social History     Socioeconomic History    Marital status:    Tobacco Use    Smoking status: Former     Current packs/day: 0.00     Average packs/day: 0.1 packs/day for 5.0 years (0.5 ttl pk-yrs)     Types: Cigarettes     Start date: 1987     Quit date: 1992     Years since quittin.8    Smokeless tobacco: Former   Substance and Sexual Activity    Alcohol use: Yes     Alcohol/week: 1.0 - 2.0 standard drink of alcohol     Types: 1 - 2  "Glasses of wine per week     Comment: daily    Drug use: No    Sexual activity: Yes     Partners: Male     Birth control/protection: None, See Surgical Hx     Comment:  to [place]      Social Drivers of Health     Financial Resource Strain: Low Risk  (1/24/2024)    Overall Financial Resource Strain (CARDIA)     Difficulty of Paying Living Expenses: Not very hard   Food Insecurity: No Food Insecurity (1/24/2024)    Hunger Vital Sign     Worried About Running Out of Food in the Last Year: Never true     Ran Out of Food in the Last Year: Never true   Transportation Needs: No Transportation Needs (1/24/2024)    PRAPARE - Transportation     Lack of Transportation (Medical): No     Lack of Transportation (Non-Medical): No   Physical Activity: Insufficiently Active (1/24/2024)    Exercise Vital Sign     Days of Exercise per Week: 3 days     Minutes of Exercise per Session: 30 min   Stress: Stress Concern Present (1/24/2024)    [employer] - Occupational Stress Questionnaire     Feeling of Stress : Rather much   Housing Stability: Low Risk  (1/24/2024)    Housing Stability Vital Sign     Unable to Pay for Housing in the Last Year: No     Number of Places Lived in the Last Year: 1     Unstable Housing in the Last Year: No       Review of patient's allergies indicates:   Allergen Reactions    Latex, natural rubber Rash    Iodinated contrast media     Iodine     Ozempic [semaglutide] Other (See Comments)     SI    Iodine and iodide containing products Rash     Eats shellfish.  Eats shellfish.  Eats shellfish.    Meloxicam Hives     Can take ibuprofen       Review of Systems:  12 point review of systems is negative.    Physical Exam:  Vitals:    05/14/25 1042   BP: 139/83   Pulse: (!) 56   Weight: 114.3 kg (251 lb 15.8 oz)   Height: 5' 9.6" (1.768 m)   PainSc:   6   PainLoc: Neck     Body mass index is 36.57 kg/m².    Gen: NAD  Psych: mood appropriate for given condition  HEENT: eyes anicteric "   CV: RRR, 2+ radial pulse  Respiratory: non-labored, no signs of respiratory distress  Abd: non-distended  Skin: warm, dry and intact.  Gait: Able to heel walk, toe walk. No antalgic gait.       Cervical spine: ROM is full in flexion, extension and lateral rotation without increased pain.  Spurling's maneuver causes right and left arm pain.  Myofascial exam: No Tenderness to palpation across cervical paraspinous region bilaterally.     Lumbar spine:  Lumbar spine: ROM is full with flexion extension and oblique extension with no increased pain.    Neto's test causes no increased pain on either side.    Supine straight leg raise is negative bilaterally.    Internal and external rotation of the hip causes no increased pain on either side.  Myofascial exam: No tenderness to palpation across lumbar paraspinous muscles. No tenderness to palpation over the bilateral greater trochanters and bilateral SI joint     Sensory:  Intact and symmetrical to light touch in C4-T1 dermatomes bilaterally. Intact and symmetrical to light touch in L1-S1 dermatomes bilaterally.     Motor:      Right Left   C4 Shoulder Abduction  5  5   C5 Elbow Flexion    5  5   C6 Wrist Extension  5  5   C7 Elbow Extension   5  4   C8/T1 Hand Intrinsics   5  5            Right Left   L2/3 Iliacus Hip flexion  5  5   L3/4 Qudratus Femoris Knee Extension  5  5   L4/5 Tib Anterior Ankle Dorsiflexion   5  5   L5/S1 Extensor Hallicus Longus Great toe extension  5  5   S1/S2 Gastroc/Soleus Plantar Flexion  5  5        Right Left   Triceps DTR 2+ 2+   Biceps DTR 2+ 2+   Brachioradialis DTR 2+ 2+   Patellar DTR 2+ 2+   Achilles DTR 2+ 2+   Huffman Absent  Absent   Clonus Absent Absent   Babinski Absent Absent             Labs:  Lab Results   Component Value Date    HGBA1C 5.4 04/04/2025       Lab Results   Component Value Date    WBC 6.43 04/04/2025    HGB 13.0 04/04/2025    HCT 39.0 04/04/2025    MCV 92 04/04/2025     04/04/2025         Imaging:  MRI  cervical spine 11/29/23  Comparison is made to examination of 12/21/2021. There is degradation of the examination by patient motion.     The cervical vertebral bodies are appropriately maintained in height. Vertebral body alignment is satisfactory. There is mild disc space narrowing at C5-6 and C6-7. No pathologic marrow replacement demonstrated.     At C2-3, shallow disc bulging and posterior osteophytic ridging results in mild mass effect upon the ventral margin of the thecal sac towards the left of midline without significant encroachment of the central spinal canal. There is minor left foraminal narrowing.  At C3-4, shallow bulging of the disc margin contributes to mild mass effect upon the ventral margin of the thecal sac without significant central canal stenosis. There is mild to moderate left foraminal narrowing.  At C4-5, there is shallow bulging of the disc margin and small posteriorly directed marginal osteophyte formation contributing to mild mass effect upon the thecal sac diffusely. The central canal is not significantly encroached. There is mild left foraminal narrowing.  At C5-6, broad-based bulging of the disc margin and posterior osteophyte formation results in mild diffuse mass effect upon the thecal sac without direct cord impingement. There is moderately severe right foraminal and moderate left foraminal narrowing.  At C6-7, there is shallow bulging of the disc margin resulting in mild mass effect upon the thecal sac. There are mild degrees of bilateral foraminal narrowing.  At C7-T1, minimal disc bulging contributes to minor mass effect upon the thecal sac. There are facet degenerative changes resulting in moderate left foraminal and mild-moderate right foraminal narrowing.     The visualized segment of the cervical spinal cord appears unremarkable.     No pathologic enhancement is demonstrated following contrast administration.    MRI cervical spine 3-31-25:     FINDINGS:  The prevertebral  soft tissues are normal. Minimal anterolisthesis of C4 on C5 (2 mm).  Individual vertebral height is maintained. Marrow signal is within normal limits. The spinal cord and cervicomedullary junction are within normal limits.     At C2-3, the disc shows relative normal disc height with minimal bulging of the posterior annulus.  There is minimal left greater than right uncovertebral hypertrophy.  This results in very mild bilateral neural foraminal stenosis without spinal canal stenosis.     At C3-4, the disc shows relative normal disc height with a small broad-based posterior disc bulge.  There is moderate to severe left facet arthropathy, mild right facet arthropathy and small bilateral uncovertebral osteophytes.  This results in mild left greater than right neural foraminal stenosis and minimal spinal canal stenosis.     At C4-5, the disc shows relative normal disc height with a small broad-based posterior disc bulge.  There is moderate right and mild left facet arthropathy with small bilateral uncovertebral osteophytes.  This results in mild-to-moderate bilateral neural foraminal stenosis and very mild overall spinal canal stenosis.  The dorsal margin the disc may just touch and mildly efface the ventral margin the spinal cord.     At C5-6, the disc shows mild disc height loss with a moderate sized broad-based posterior disc osteophyte complex.  There is no facet arthropathy with moderate bilateral uncovertebral osteophytes.  This results in severe right greater than left neural foraminal stenosis and mild overall spinal canal stenosis.     At C6-7, the disc shows mild disc height loss with a small broad-based posterior disc bulge.  There is no facet arthropathy with small bilateral uncovertebral osteophytes.  This results in moderate right and mild left neural foraminal stenosis without spinal canal stenosis.     At C7-T1, the disc demonstrates mild disc height loss with minor bulging of the posterior annulus.   There is severe right and moderate left facet arthropathy with minimal uncovertebral hypertrophy.  This results in mild-to-moderate bilateral neural foraminal stenosis without spinal canal stenosis.     Impression:     1.  Slight anterolisthesis of C4 on C5.     2.  Degenerative changes disc disease as outlined in detail above most pronounced at C4-C5, C5-C6 and C6-C7.       Assessment and plan:    Problem List Items Addressed This Visit    None  Visit Diagnoses         Cervical spondylosis    -  Primary      Cervicalgia          Cervical radiculopathy                  Ninfa Aguilar is a 49 y.o. female patient who has a past medical history of Abnormal Pap smear of cervix, Arthritis, Back pain, Cervical disc syndrome, Depression, Essential tremor, General anesthetics causing adverse effect in therapeutic use, Goiter, Headache, Hypothyroidism, Lumbar disc disease, Patient is Taoist, Polycystic ovaries, PONV (postoperative nausea and vomiting), Sleep apnea, and Vitamin D deficiency.  She presents for neck pain.  Has chronic neck and back pain.  Today's visit was focused on cervical spine pain.  She did discuss symptoms with Neurosurgery recently, who referred her back to pain management for conservative management.  She prefers not have any type of surgical intervention.  She describes posterior neck pain since 2010 intermittently.  Pain became more severe in 2019.  Posterior neck pain is associated with headaches, shoulder pain and intermittent radiation of pain and numbness into the bilateral arms.  She has tried gabapentin, CBD cream and gummies, Celebrex, Parafon Forte, ibuprofen for pain control.  She is on physical therapy and chiropractic care.  She reports undergoing cervical epidural steroid injection in the past with a different physician providing greater than 1 year relief.  She did have cervical medial branch block performed in 2024 by Dr. Esparza.  Although she had a good response  regarding pain relief to the medial branch blocks, she did not want to proceed with RFA could she did not like the experience of undergoing the procedure    MRI cervical spine reviewed and shows multilevel degenerative changes.  At C5-6 there is disc osteophyte complex with severe right-greater-than-left foraminal narrowing.    She has chronic pain in the neck with bilateral arm pain.  Very likely bilateral C6 radiculopathy with neck pain and radiculopathy influenced by C5-6 disc bulge and foraminal narrowing.  No focal neurological deficits.  She also reports chronic thoracic and lumbar back pain.  Today's visit focused on the cervical spine.    - we discussed role of medications, physical therapy, and epidural steroid injection.  She reports good improvement with RAJAT in the past.  She prefers to avoid any RFA is or surgical intervention.  - we can try C7-T1 interlaminar RAJAT.  Procedure, risks, benefits discussed.  She would like to proceed.  - follow-up in clinic with Dr. Esparza after procedure.    : Not applicable      Allison Trent PA-C  [facility]

## 2025-05-21 ENCOUNTER — TELEPHONE (OUTPATIENT)
Dept: PAIN MEDICINE | Facility: CLINIC | Age: 50
End: 2025-05-21
Payer: COMMERCIAL

## 2025-05-21 ENCOUNTER — OFFICE VISIT (OUTPATIENT)
Dept: SPORTS MEDICINE | Facility: CLINIC | Age: 50
End: 2025-05-21
Payer: COMMERCIAL

## 2025-05-21 VITALS
HEART RATE: 57 BPM | SYSTOLIC BLOOD PRESSURE: 133 MMHG | WEIGHT: 247.81 LBS | DIASTOLIC BLOOD PRESSURE: 82 MMHG | BODY MASS INDEX: 35.48 KG/M2 | HEIGHT: 70 IN

## 2025-05-21 DIAGNOSIS — M17.11 PRIMARY OSTEOARTHRITIS OF RIGHT KNEE: Primary | ICD-10-CM

## 2025-05-21 DIAGNOSIS — M54.12 CERVICAL RADICULOPATHY: Primary | ICD-10-CM

## 2025-05-21 DIAGNOSIS — M17.12 PRIMARY OSTEOARTHRITIS OF LEFT KNEE: ICD-10-CM

## 2025-05-21 PROCEDURE — 99499 UNLISTED E&M SERVICE: CPT | Mod: S$GLB,,, | Performed by: PHYSICIAN ASSISTANT

## 2025-05-21 PROCEDURE — 20610 DRAIN/INJ JOINT/BURSA W/O US: CPT | Mod: 50,S$GLB,, | Performed by: PHYSICIAN ASSISTANT

## 2025-05-21 PROCEDURE — 99999 PR PBB SHADOW E&M-EST. PATIENT-LVL IV: CPT | Mod: PBBFAC,,, | Performed by: PHYSICIAN ASSISTANT

## 2025-05-21 RX ORDER — SODIUM CHLORIDE, SODIUM LACTATE, POTASSIUM CHLORIDE, CALCIUM CHLORIDE 600; 310; 30; 20 MG/100ML; MG/100ML; MG/100ML; MG/100ML
INJECTION, SOLUTION INTRAVENOUS CONTINUOUS
OUTPATIENT
Start: 2025-05-21

## 2025-05-21 NOTE — TELEPHONE ENCOUNTER
Spoke with patient and scheduled procedure and follow up. Let her know to hold ASA for 7 days prior and NSAIDS for 2 days prior

## 2025-05-21 NOTE — PROGRESS NOTES
Patient is here for follow up of bilateral knee arthritis. Pt is requesting bilateral Synvisc one injections.  [facility] Has failed other conservative modalities including NSAIDS, activity modification, weight loss.    The prior shot was tolerated well.    PHYSICAL EXAMINATION:     General: The patient is alert and oriented x 3. Mood is pleasant.   Observation of ears, eyes and nose reveals no gross abnormalities. No   labored breathing observed.     No signs of infection or adverse reaction to knee.    PROCEDURE NOTE:  Injection Procedure bilateral knees  A time out was performed, including verification of patient ID, procedure, site and side, availability of information and equipment, review of safety issues, and agreement with consent, the procedure site was marked.    After time out was performed, the patient was prepped aseptically with povidone-iodine swabsticks. A diagnostic and therapeutic injection of 6cc Synvisc one was given under sterile technique using a 22g x 1.5 needle from the Superolateral  aspect of the bilateral Knee Joint in the supine position.      Ninfa Aguilar had no adverse reactions to the medication. Pain decreased. She was instructed to apply ice to the joint for 20 minutes and avoid strenuous activities for 24-36 hours following the injection. She was warned of possible blood sugar and/or blood pressure changes during that time. Following that time, she can resume regular activities.    She was reminded to call the clinic immediately for any adverse side effects as explained in clinic today.    RTC in 6 months with Juliette Cheney PA-C for follow up bilateral knees and possible repeat visco supplementation.  All questions were answered, pt will contact us for questions or concerns in the interim.

## 2025-05-21 NOTE — TELEPHONE ENCOUNTER
----- Message from Johanne sent at 5/21/2025  2:28 PM CDT -----  Contact: pt  Type: Needs Medical Advice Who Called:Monica Call Back Number:[phone] Information: Requesting a call back regarding  pt returned office call to schedule Epidural  . Pt asking for a call back please. Please Advise- Thank you

## 2025-06-04 ENCOUNTER — HOSPITAL ENCOUNTER (OUTPATIENT)
Dept: RADIOLOGY | Facility: HOSPITAL | Age: 50
Discharge: HOME | End: 2025-06-04
Attending: ANESTHESIOLOGY | Admitting: ANESTHESIOLOGY
Payer: COMMERCIAL

## 2025-06-04 ENCOUNTER — HOSPITAL ENCOUNTER (OUTPATIENT)
Facility: HOSPITAL | Age: 50
Discharge: HOME | End: 2025-06-04
Attending: ANESTHESIOLOGY | Admitting: ANESTHESIOLOGY
Payer: COMMERCIAL

## 2025-06-04 DIAGNOSIS — M54.2 NECK PAIN: ICD-10-CM

## 2025-06-04 DIAGNOSIS — M54.12 CERVICAL RADICULOPATHY: Primary | ICD-10-CM

## 2025-06-04 PROCEDURE — 62321 NJX INTERLAMINAR CRV/THRC: CPT | Mod: ,,, | Performed by: ANESTHESIOLOGY

## 2025-06-04 PROCEDURE — 63600175 PHARM REV CODE 636 W HCPCS: Mod: PO | Performed by: ANESTHESIOLOGY

## 2025-06-04 PROCEDURE — 25000003 PHARM REV CODE 250: Mod: PO | Performed by: ANESTHESIOLOGY

## 2025-06-04 PROCEDURE — A4216 STERILE WATER/SALINE, 10 ML: HCPCS | Mod: PO | Performed by: ANESTHESIOLOGY

## 2025-06-04 PROCEDURE — 62321 NJX INTERLAMINAR CRV/THRC: CPT | Mod: PO | Performed by: ANESTHESIOLOGY

## 2025-06-04 RX ORDER — DEXAMETHASONE SODIUM PHOSPHATE 10 MG/ML
INJECTION, SOLUTION INTRA-ARTICULAR; INTRALESIONAL; INTRAMUSCULAR; INTRAVENOUS; SOFT TISSUE
Status: DISCONTINUED | OUTPATIENT
Start: 2025-06-04 | End: 2025-06-04 | Stop reason: HOSPADM

## 2025-06-04 RX ORDER — SODIUM CHLORIDE 9 MG/ML
INJECTION, SOLUTION INTRAVENOUS CONTINUOUS
Status: DISCONTINUED | OUTPATIENT
Start: 2025-06-04 | End: 2025-06-04 | Stop reason: HOSPADM

## 2025-06-04 RX ORDER — SODIUM CHLORIDE, SODIUM LACTATE, POTASSIUM CHLORIDE, CALCIUM CHLORIDE 600; 310; 30; 20 MG/100ML; MG/100ML; MG/100ML; MG/100ML
INJECTION, SOLUTION INTRAVENOUS CONTINUOUS
Status: DISCONTINUED | OUTPATIENT
Start: 2025-06-04 | End: 2025-06-04

## 2025-06-04 RX ORDER — MIDAZOLAM HYDROCHLORIDE 1 MG/ML
INJECTION INTRAMUSCULAR; INTRAVENOUS
Status: DISCONTINUED | OUTPATIENT
Start: 2025-06-04 | End: 2025-06-04 | Stop reason: HOSPADM

## 2025-06-04 RX ORDER — SODIUM CHLORIDE 9 MG/ML
INJECTION, SOLUTION INTRAMUSCULAR; INTRAVENOUS; SUBCUTANEOUS
Status: DISCONTINUED | OUTPATIENT
Start: 2025-06-04 | End: 2025-06-04 | Stop reason: HOSPADM

## 2025-06-04 RX ORDER — LIDOCAINE HYDROCHLORIDE 10 MG/ML
INJECTION, SOLUTION EPIDURAL; INFILTRATION; INTRACAUDAL; PERINEURAL
Status: DISCONTINUED | OUTPATIENT
Start: 2025-06-04 | End: 2025-06-04 | Stop reason: HOSPADM

## 2025-06-04 RX ADMIN — SODIUM CHLORIDE: 9 INJECTION, SOLUTION INTRAVENOUS at 03:06

## 2025-06-04 NOTE — OP NOTE
"Procedure Note    Procedure Date: 6/4/2025    Procedure Performed:  C7-T1 cervical interlaminar epidural steroid injection under fluoroscopy.    Indications:  Ninfa Aguilar presents with cervical radiculitis/radiculopathy secondary to disc herniation, osteophyte/osteophyte complexes, and/or severe degenerative disc disease producing foraminal or central spinal stenosis.  The pain has been present for at least 4 weeks and the patient has failed to respond to noninvasive conservative care.  Pain rated by NRS at baseline prior to intervention is 6/10.  Their radiculitis/radiculopathy and/or neurogenic claudication is severe enough to greatly impact their quality of life or function.      Pre-op diagnosis: Cervical Radiculitis/Radiculopathy    Post-op diagnosis: same    Physician: Joshua Esparza MD    IV anxiolysis medications: versed 2mg    Medications injected: Dexamethasone 15mg, 3.5 mL sterile, preservative-free normal saline.    Local anesthetic used: 1% Lidocaine, 1 ml    Estimated Blood Loss: none    Complications:  none    Technique:  The patient was interviewed in the holding area and Risks/Benefits were discussed, including, but not limited to, the possibility of new or different pain, bleeding or infection.   All questions were answered.  The patient understood and accepted risks.  Consent was verfied.  A time-out was taken to identify patient and procedure prior to starting the procedure.  With the patient laying in a prone position with the neck in a mid-flexed forward position, the area was prepped and draped in the usual sterile fashion using ChloraPrep x2 and a fenestrated drape.  The area was determined under AP fluoroscopic guidance.  The skin and subcutaneous tissues overlying the targeted interspace were anesthetized with 3-5 mL of 1% Lidocaine using a 25G 1.5" needle.  A 20G, 3.5" Tuohy epidural needle was inserted through the anesthetized skin and directed toward the interspace under " fluoroscopic guidance until T1 lamina was contacted.  The fluoroscope was then adjusted to yield a contralateral oblique view of the C7-T1 interspace.  The epidural needle was incrementally walked cephalad off of the lamina until the ligamentum flavum was engaged. From this point, a loss-of-resistance technique was used to identify entrance of the needle into the epidural space.  No contrast, allergic (SOB).  Then, after negative aspiration, dexamethasone 15 mg + 3.5 cc NS was injected.  The needle was flushed with normal saline and removed. The contrast was seen to be displaced after injection. Patient was awake/responsive during all injections.  The patient tolerated the procedure well and was transferred to the [facility] in stable condition.  The patient was monitored after the procedure and was given post-procedure and discharge instructions to follow at home. The patient was discharged in a stable condition.

## 2025-06-04 NOTE — INTERVAL H&P NOTE
The patient has been examined and the H&P has been reviewed:    I concur with the findings and no changes have occurred since H&P was written.  She has held aspirin appropriately. The risks and benefits of this intervention, and alternative therapies were discussed with the patient.  The discussion of risks included infection, bleeding, need for additional procedures or surgery, nerve damage.  Questions regarding the procedure, risks, expected outcome, and possible side effects were solicited and answered to the patient's satisfaction.  Ninfa Jeff wishes to proceed with the injection or procedure.  Written consent was obtained.   ASA 2, MP II      There are no hospital problems to display for this patient.

## 2025-06-04 NOTE — DISCHARGE SUMMARY
[facility]  Discharge Note  Short Stay    Admit Date: 6/4/2025    Discharge Date: 6/4/2025    Attending Physician: Joshua Esparza     Discharge Provider: Joshua Esparza    Diagnoses:  There are no hospital problems to display for this patient.      Discharged Condition: Good    Final Diagnoses: Cervical radiculopathy [M54.12]    Disposition: Home or Self Care    Hospital Course: No complications, uneventful    Outcome of Hospitalization, Treatment, Procedure, or Surgery:  Patient was admitted for outpatient interventional pain management procedure. The patient tolerated the procedure well with no complications.    Follow up/Patient Instructions:  Follow up as scheduled in Pain Management office in 2-3 weeks.  Patient has received instructions and follow up date and time.    Medications:  Continue previous medications, except restart aspirin in 24 hours    Discharge Procedure Orders   Notify your health care provider if you experience any of the following:  temperature >100.4     Notify your health care provider if you experience any of the following:  persistent nausea and vomiting or diarrhea     Notify your health care provider if you experience any of the following:  severe uncontrolled pain     Notify your health care provider if you experience any of the following:  redness, tenderness, or signs of infection (pain, swelling, redness, odor or green/yellow discharge around incision site)     Notify your health care provider if you experience any of the following:  difficulty breathing or increased cough     Notify your health care provider if you experience any of the following:  severe persistent headache     Notify your health care provider if you experience any of the following:  worsening rash     Notify your health care provider if you experience any of the following:  persistent dizziness, light-headedness, or visual disturbances     Notify your health care provider if you experience any of the  following:  increased confusion or weakness     Activity as tolerated

## 2025-06-05 VITALS
WEIGHT: 252 LBS | OXYGEN SATURATION: 99 % | DIASTOLIC BLOOD PRESSURE: 65 MMHG | SYSTOLIC BLOOD PRESSURE: 119 MMHG | BODY MASS INDEX: 37.33 KG/M2 | RESPIRATION RATE: 18 BRPM | TEMPERATURE: 97 F | HEART RATE: 67 BPM | HEIGHT: 69 IN

## 2025-06-06 ENCOUNTER — TELEPHONE (OUTPATIENT)
Dept: PAIN MEDICINE | Facility: CLINIC | Age: 50
End: 2025-06-06
Payer: COMMERCIAL

## 2025-06-06 NOTE — TELEPHONE ENCOUNTER
----- Message from Nurse Jocy sent at 6/5/2025 12:24 PM CDT -----  Regarding: post op call  I contacted patient for a post op call. She is complaining of a headache that is relieved upon lying down. Instructed patient to go to ER for evaluation for a possible leak. Please follow up with patient. Thanks,ANABELLA Sandra

## 2025-06-06 NOTE — TELEPHONE ENCOUNTER
I spoke with the patient.  Discomfort between her shoulder blades has resolved however she still has some throbbing towards the back of her occiput.  Overall she is doing well.  She is going to use Fioricet and muscle relaxants.  She will call my office with any further questions or concerns.  Follow up as scheduled

## 2025-06-06 NOTE — TELEPHONE ENCOUNTER
Spoke with patient and she did end up going to ER for throbbing headache relieved upon laying down. ER said they could do blood patch or morphine and migraine medicine. She has had meningitis in the past. She said she felt better after morphine, but still felt pressure. ER said it may also be muscular from the puncture itself

## 2025-06-19 ENCOUNTER — TELEPHONE (OUTPATIENT)
Dept: PHARMACY | Facility: CLINIC | Age: 50
End: 2025-06-19
Payer: COMMERCIAL

## 2025-06-20 NOTE — TELEPHONE ENCOUNTER
[facility]/Population Health Chart Review & Patient Outreach Details For Medication Adherence Project    Reason for Outreach Encounter: 3rd Party payor non-compliance report ([employer], [employer], C, etc)  2.  Patient Outreach Method: Reviewed patient chart   3.   Medication in question:    Hyperlipidemia Medications              atorvastatin (LIPITOR) 20 MG tablet Take 1 tablet (20 mg total) by mouth every evening.                  atorvastatin  last filled  5/17/25 for 90 day supply      4.  Reviewed and or Updates Made To: Patient Chart  5. Outreach Outcomes and/or actions taken: Patient filled medication and is on track to be adherent  Additional Notes:

## (undated) DEVICE — TRAY NERVE BLOCK

## (undated) DEVICE — GAUZE CONFORMING ELASTOMILL 4

## (undated) DEVICE — TOWEL OR DISP STRL BLUE 4/PK

## (undated) DEVICE — DRESSING XEROFORM FOIL PK 1X8

## (undated) DEVICE — SEE MEDLINE ITEM 157150

## (undated) DEVICE — DRAPE HEAD/BAR 40 X 27 W/ADH

## (undated) DEVICE — SUT ETHILON 3/0 18IN PS-1

## (undated) DEVICE — SPONGE GAUZE 16PLY 4X4

## (undated) DEVICE — GLOVE 7.5 PROTEXIS PI MICRO

## (undated) DEVICE — NDL SPINAL 25GX3.5 SPINOCAN

## (undated) DEVICE — SEE MEDLINE ITEM 146298

## (undated) DEVICE — Device

## (undated) DEVICE — APPLICATOR CHLORAPREP CLR 10.5

## (undated) DEVICE — PADDING CAST 4IN DELTA ROLL

## (undated) DEVICE — APPLICATOR CHLORAPREP ORN 26ML

## (undated) DEVICE — SEE MEDLINE ITEM 152515

## (undated) DEVICE — DRESSING GAUZE 6PLY 4X4

## (undated) DEVICE — SYR GLASS 5CC LUER LOK

## (undated) DEVICE — GAUZE SPONGE 4X4 12PLY

## (undated) DEVICE — STOCKINET TUBULAR 1 PLY 6X60IN

## (undated) DEVICE — PAD CAST SPECIALIST STRL 4

## (undated) DEVICE — HANDLE SURG LIGHT NONRIGID

## (undated) DEVICE — SHOE DRCO POSTOP FEM S/B SZ LG

## (undated) DEVICE — ELECTRODE REM PLYHSV RETURN 9

## (undated) DEVICE — PEN LIGHTS DIAGNOSTIC C-LINE

## (undated) DEVICE — TRAY MINOR ORTHO

## (undated) DEVICE — DRESSING XEROFORM 1X8IN